# Patient Record
Sex: MALE | Race: ASIAN | HISPANIC OR LATINO | URBAN - METROPOLITAN AREA
[De-identification: names, ages, dates, MRNs, and addresses within clinical notes are randomized per-mention and may not be internally consistent; named-entity substitution may affect disease eponyms.]

---

## 2017-01-18 ENCOUNTER — INPATIENT (INPATIENT)
Facility: HOSPITAL | Age: 82
LOS: 1 days | Discharge: HOME | DRG: 92 | End: 2017-01-20
Attending: HOSPITALIST | Admitting: HOSPITALIST
Payer: COMMERCIAL

## 2017-01-18 VITALS
DIASTOLIC BLOOD PRESSURE: 94 MMHG | SYSTOLIC BLOOD PRESSURE: 188 MMHG | RESPIRATION RATE: 17 BRPM | TEMPERATURE: 99 F | OXYGEN SATURATION: 99 % | HEART RATE: 82 BPM

## 2017-01-18 LAB
ALBUMIN SERPL ELPH-MCNC: 4.7 G/DL — SIGNIFICANT CHANGE UP (ref 3.3–5)
ALP SERPL-CCNC: 45 U/L — SIGNIFICANT CHANGE UP (ref 40–120)
ALT FLD-CCNC: 22 U/L RC — SIGNIFICANT CHANGE UP (ref 10–45)
ANION GAP SERPL CALC-SCNC: 18 MMOL/L — HIGH (ref 5–17)
APTT BLD: 33 SEC — SIGNIFICANT CHANGE UP (ref 27.5–37.4)
AST SERPL-CCNC: 34 U/L — SIGNIFICANT CHANGE UP (ref 10–40)
BASOPHILS # BLD AUTO: 0.1 K/UL — SIGNIFICANT CHANGE UP (ref 0–0.2)
BASOPHILS NFR BLD AUTO: 0 % — SIGNIFICANT CHANGE UP (ref 0–2)
BILIRUB SERPL-MCNC: 0.3 MG/DL — SIGNIFICANT CHANGE UP (ref 0.2–1.2)
BUN SERPL-MCNC: 25 MG/DL — HIGH (ref 7–23)
CALCIUM SERPL-MCNC: 10.1 MG/DL — SIGNIFICANT CHANGE UP (ref 8.4–10.5)
CHLORIDE SERPL-SCNC: 99 MMOL/L — SIGNIFICANT CHANGE UP (ref 96–108)
CO2 SERPL-SCNC: 20 MMOL/L — LOW (ref 22–31)
CREAT SERPL-MCNC: 1.32 MG/DL — HIGH (ref 0.5–1.3)
EOSINOPHIL # BLD AUTO: 0.9 K/UL — HIGH (ref 0–0.5)
EOSINOPHIL NFR BLD AUTO: 5 % — SIGNIFICANT CHANGE UP (ref 0–6)
GLUCOSE SERPL-MCNC: 100 MG/DL — HIGH (ref 70–99)
HCT VFR BLD CALC: 38.8 % — LOW (ref 39–50)
HGB BLD-MCNC: 12.7 G/DL — LOW (ref 13–17)
INR BLD: 1.02 RATIO — SIGNIFICANT CHANGE UP (ref 0.88–1.16)
LYMPHOCYTES # BLD AUTO: 2 K/UL — SIGNIFICANT CHANGE UP (ref 1–3.3)
LYMPHOCYTES # BLD AUTO: 9 % — LOW (ref 13–44)
MCHC RBC-ENTMCNC: 27.7 PG — SIGNIFICANT CHANGE UP (ref 27–34)
MCHC RBC-ENTMCNC: 32.7 GM/DL — SIGNIFICANT CHANGE UP (ref 32–36)
MCV RBC AUTO: 84.7 FL — SIGNIFICANT CHANGE UP (ref 80–100)
MONOCYTES # BLD AUTO: 0.7 K/UL — SIGNIFICANT CHANGE UP (ref 0–0.9)
MONOCYTES NFR BLD AUTO: 4 % — SIGNIFICANT CHANGE UP (ref 2–14)
NEUTROPHILS # BLD AUTO: 13.7 K/UL — HIGH (ref 1.8–7.4)
NEUTROPHILS NFR BLD AUTO: 72 % — SIGNIFICANT CHANGE UP (ref 43–77)
PLATELET # BLD AUTO: 256 K/UL — SIGNIFICANT CHANGE UP (ref 150–400)
POTASSIUM SERPL-MCNC: 6.5 MMOL/L — CRITICAL HIGH (ref 3.5–5.3)
POTASSIUM SERPL-SCNC: 6.5 MMOL/L — CRITICAL HIGH (ref 3.5–5.3)
PROT SERPL-MCNC: 7.8 G/DL — SIGNIFICANT CHANGE UP (ref 6–8.3)
PROTHROM AB SERPL-ACNC: 11.1 SEC — SIGNIFICANT CHANGE UP (ref 10–13.1)
RBC # BLD: 4.59 M/UL — SIGNIFICANT CHANGE UP (ref 4.2–5.8)
RBC # FLD: 15.5 % — HIGH (ref 10.3–14.5)
SODIUM SERPL-SCNC: 137 MMOL/L — SIGNIFICANT CHANGE UP (ref 135–145)
TROPONIN T SERPL-MCNC: <0.01 NG/ML — SIGNIFICANT CHANGE UP (ref 0–0.06)
WBC # BLD: 18.2 K/UL — HIGH (ref 3.8–10.5)
WBC # FLD AUTO: 18.2 K/UL — HIGH (ref 3.8–10.5)

## 2017-01-18 PROCEDURE — 93010 ELECTROCARDIOGRAM REPORT: CPT

## 2017-01-18 PROCEDURE — 99285 EMERGENCY DEPT VISIT HI MDM: CPT | Mod: 25

## 2017-01-18 NOTE — ED PROVIDER NOTE - NS ED MD SCRIBE ATTENDING SCRIBE SECTIONS
HISTORY OF PRESENT ILLNESS/INTAKE ASSESSMENT/SCREENINGS/VITAL SIGNS( Pullset)/PAST MEDICAL/SURGICAL/SOCIAL HISTORY/HIV/RESULTS/REVIEW OF SYSTEMS/PHYSICAL EXAM

## 2017-01-18 NOTE — ED PROVIDER NOTE - MEDICAL DECISION MAKING DETAILS
82 year old male with dizziness also LUE and LLE extremity reported weakness and sensation changes. Risk factors for cerebrovascular accident but outside of TPA window at this time. No focal weakness appreciated on exam but reported subjected weakness. Hx not clearly vertiginous regarding dizziness. Will check labs, CT head, likely neurology consult to rule out subacute stroke.

## 2017-01-18 NOTE — ED PROVIDER NOTE - OBJECTIVE STATEMENT
82 year old male with PMHx of DM, HLD, and HTN, presents with diffuse headache and dizziness since 4pm today. Symptoms began suddenly today and have been intermittent since onset. Denies room spinning. Endorses nausea but denies vomiting. Also endorses unspecified vision changes with left upper and lower extremity numbness/weakness. Pt reports urinating more frequently today than usual. Denies fevers, chills, recent head trauma, chest pain, SOB, abdominal pain, back pain or neck pain. Denies sick contacts. FS at home was 109 PTA.

## 2017-01-18 NOTE — ED ADULT NURSE NOTE - CHIEF COMPLAINT QUOTE
Patient c/o left sided numbness, and dizziness, symptoms started about 4 pm. Patient moving all extremities equally, speech wnl as per daughter.

## 2017-01-18 NOTE — ED ADULT TRIAGE NOTE - CHIEF COMPLAINT QUOTE
Patient c/o left sided numbness, since about 5 pm and dizziness. Patient moving all extremities equally, speech wnl as per daughter.

## 2017-01-18 NOTE — ED PROVIDER NOTE - CARE PLAN
Principal Discharge DX:	Leukocytosis, unspecified type  Secondary Diagnosis:	Lactic acid blood increased

## 2017-01-18 NOTE — ED PROVIDER NOTE - INTERPRETATION
normal sinus rhythm, Normal axis, Normal MD interval and QRS complex. There are no acute ischemic ST or T-wave changes.

## 2017-01-18 NOTE — ED PROVIDER NOTE - PROGRESS NOTE DETAILS
CT head negative, unremarkable neurologic exam negative, however lab workup with leukocytosis with significant left shift, high lactate suggesting possible occult septic process as cause of presenting symptoms with non specific exam - will pan culture, start broad spectrum antibiotics, admit for further care.

## 2017-01-18 NOTE — ED ADULT NURSE NOTE - OBJECTIVE STATEMENT
Pt comes in with dizziness started today a 4pm  No LOC no visual changes noted or weakness noted .   IVL placed and bloods sent as  ordred pending ct scan Kehinde

## 2017-01-18 NOTE — ED PROVIDER NOTE - ENMT NEGATIVE STATEMENT, MLM
Ears: no ear pain and no hearing problems.Nose: no nasal congestion and no nasal drainage.Mouth/Throat: no dysphagia, no hoarseness and no throat pain.Neck: no lumps, no pain, no stiffness and no swollen glands. no nasal conjestion, no throat pain

## 2017-01-19 DIAGNOSIS — R20.2 PARESTHESIA OF SKIN: ICD-10-CM

## 2017-01-19 DIAGNOSIS — I10 ESSENTIAL (PRIMARY) HYPERTENSION: ICD-10-CM

## 2017-01-19 DIAGNOSIS — E11.9 TYPE 2 DIABETES MELLITUS WITHOUT COMPLICATIONS: ICD-10-CM

## 2017-01-19 DIAGNOSIS — D72.829 ELEVATED WHITE BLOOD CELL COUNT, UNSPECIFIED: ICD-10-CM

## 2017-01-19 DIAGNOSIS — Z41.8 ENCOUNTER FOR OTHER PROCEDURES FOR PURPOSES OTHER THAN REMEDYING HEALTH STATE: ICD-10-CM

## 2017-01-19 LAB
ANION GAP SERPL CALC-SCNC: 13 MMOL/L — SIGNIFICANT CHANGE UP (ref 5–17)
ANION GAP SERPL CALC-SCNC: 16 MMOL/L — SIGNIFICANT CHANGE UP (ref 5–17)
APPEARANCE UR: CLEAR — SIGNIFICANT CHANGE UP
BASE EXCESS BLDV CALC-SCNC: -1.8 MMOL/L — SIGNIFICANT CHANGE UP (ref -2–2)
BASOPHILS # BLD AUTO: 0.1 K/UL — SIGNIFICANT CHANGE UP (ref 0–0.2)
BILIRUB UR-MCNC: NEGATIVE — SIGNIFICANT CHANGE UP
BUN SERPL-MCNC: 21 MG/DL — SIGNIFICANT CHANGE UP (ref 7–23)
BUN SERPL-MCNC: 26 MG/DL — HIGH (ref 7–23)
CA-I SERPL-SCNC: 1.26 MMOL/L — SIGNIFICANT CHANGE UP (ref 1.12–1.3)
CALCIUM SERPL-MCNC: 10.1 MG/DL — SIGNIFICANT CHANGE UP (ref 8.4–10.5)
CALCIUM SERPL-MCNC: 9.3 MG/DL — SIGNIFICANT CHANGE UP (ref 8.4–10.5)
CHLORIDE BLDV-SCNC: 107 MMOL/L — SIGNIFICANT CHANGE UP (ref 96–108)
CHLORIDE SERPL-SCNC: 100 MMOL/L — SIGNIFICANT CHANGE UP (ref 96–108)
CHLORIDE SERPL-SCNC: 104 MMOL/L — SIGNIFICANT CHANGE UP (ref 96–108)
CO2 BLDV-SCNC: 24 MMOL/L — SIGNIFICANT CHANGE UP (ref 22–30)
CO2 SERPL-SCNC: 21 MMOL/L — LOW (ref 22–31)
CO2 SERPL-SCNC: 22 MMOL/L — SIGNIFICANT CHANGE UP (ref 22–31)
COLOR SPEC: SIGNIFICANT CHANGE UP
CREAT SERPL-MCNC: 1.19 MG/DL — SIGNIFICANT CHANGE UP (ref 0.5–1.3)
CREAT SERPL-MCNC: 1.27 MG/DL — SIGNIFICANT CHANGE UP (ref 0.5–1.3)
DIFF PNL FLD: NEGATIVE — SIGNIFICANT CHANGE UP
EOSINOPHIL # BLD AUTO: 0.5 K/UL — SIGNIFICANT CHANGE UP (ref 0–0.5)
EOSINOPHIL NFR BLD AUTO: 1 % — SIGNIFICANT CHANGE UP (ref 0–6)
FOLATE SERPL-MCNC: 16.2 NG/ML — SIGNIFICANT CHANGE UP (ref 4.8–24.2)
GAS PNL BLDV: 138 MMOL/L — SIGNIFICANT CHANGE UP (ref 136–145)
GAS PNL BLDV: SIGNIFICANT CHANGE UP
GLUCOSE BLDV-MCNC: 83 MG/DL — SIGNIFICANT CHANGE UP (ref 70–99)
GLUCOSE SERPL-MCNC: 129 MG/DL — HIGH (ref 70–99)
GLUCOSE SERPL-MCNC: 65 MG/DL — LOW (ref 70–99)
GLUCOSE UR QL: NEGATIVE — SIGNIFICANT CHANGE UP
HBA1C BLD-MCNC: 7.4 % — HIGH (ref 4–5.6)
HCO3 BLDV-SCNC: 23 MMOL/L — SIGNIFICANT CHANGE UP (ref 21–29)
HCT VFR BLD CALC: 38.4 % — LOW (ref 39–50)
HCT VFR BLDA CALC: 36 % — LOW (ref 39–50)
HGB BLD CALC-MCNC: 11.8 G/DL — LOW (ref 13–17)
HGB BLD-MCNC: 12.7 G/DL — LOW (ref 13–17)
KETONES UR-MCNC: NEGATIVE — SIGNIFICANT CHANGE UP
LACTATE BLDV-MCNC: 2 MMOL/L — SIGNIFICANT CHANGE UP (ref 0.7–2)
LACTATE SERPL-SCNC: 1.7 MMOL/L — SIGNIFICANT CHANGE UP (ref 0.7–2)
LEUKOCYTE ESTERASE UR-ACNC: NEGATIVE — SIGNIFICANT CHANGE UP
LYMPHOCYTES # BLD AUTO: 0.9 K/UL — LOW (ref 1–3.3)
LYMPHOCYTES # BLD AUTO: 4 % — LOW (ref 13–44)
MAGNESIUM SERPL-MCNC: 1.8 MG/DL — SIGNIFICANT CHANGE UP (ref 1.6–2.6)
MCHC RBC-ENTMCNC: 28.3 PG — SIGNIFICANT CHANGE UP (ref 27–34)
MCHC RBC-ENTMCNC: 33.1 GM/DL — SIGNIFICANT CHANGE UP (ref 32–36)
MCV RBC AUTO: 85.3 FL — SIGNIFICANT CHANGE UP (ref 80–100)
MONOCYTES # BLD AUTO: 0.4 K/UL — SIGNIFICANT CHANGE UP (ref 0–0.9)
MONOCYTES NFR BLD AUTO: 10 % — SIGNIFICANT CHANGE UP (ref 2–14)
NEUTROPHILS # BLD AUTO: 17.3 K/UL — HIGH (ref 1.8–7.4)
NEUTROPHILS NFR BLD AUTO: 77 % — SIGNIFICANT CHANGE UP (ref 43–77)
NITRITE UR-MCNC: NEGATIVE — SIGNIFICANT CHANGE UP
OTHER CELLS CSF MANUAL: 12 ML/DL — LOW (ref 18–22)
PCO2 BLDV: 41 MMHG — SIGNIFICANT CHANGE UP (ref 35–50)
PH BLDV: 7.36 — SIGNIFICANT CHANGE UP (ref 7.35–7.45)
PH UR: 6.5 — SIGNIFICANT CHANGE UP (ref 4.8–8)
PLATELET # BLD AUTO: 229 K/UL — SIGNIFICANT CHANGE UP (ref 150–400)
PO2 BLDV: 47 MMHG — HIGH (ref 25–45)
POTASSIUM BLDV-SCNC: 4.3 MMOL/L — SIGNIFICANT CHANGE UP (ref 3.5–5)
POTASSIUM SERPL-MCNC: 4.7 MMOL/L — SIGNIFICANT CHANGE UP (ref 3.5–5.3)
POTASSIUM SERPL-MCNC: 5 MMOL/L — SIGNIFICANT CHANGE UP (ref 3.5–5.3)
POTASSIUM SERPL-SCNC: 4.7 MMOL/L — SIGNIFICANT CHANGE UP (ref 3.5–5.3)
POTASSIUM SERPL-SCNC: 5 MMOL/L — SIGNIFICANT CHANGE UP (ref 3.5–5.3)
PROT UR-MCNC: SIGNIFICANT CHANGE UP
RAPID RVP RESULT: SIGNIFICANT CHANGE UP
RBC # BLD: 4.5 M/UL — SIGNIFICANT CHANGE UP (ref 4.2–5.8)
RBC # FLD: 16.1 % — HIGH (ref 10.3–14.5)
SAO2 % BLDV: 75 % — SIGNIFICANT CHANGE UP (ref 67–88)
SODIUM SERPL-SCNC: 138 MMOL/L — SIGNIFICANT CHANGE UP (ref 135–145)
SODIUM SERPL-SCNC: 138 MMOL/L — SIGNIFICANT CHANGE UP (ref 135–145)
SP GR SPEC: 1.01 — SIGNIFICANT CHANGE UP (ref 1.01–1.02)
TSH SERPL-MCNC: 2.37 UIU/ML — SIGNIFICANT CHANGE UP (ref 0.27–4.2)
UROBILINOGEN FLD QL: NEGATIVE — SIGNIFICANT CHANGE UP
VIT B12 SERPL-MCNC: >2000 PG/ML — HIGH (ref 243–894)
WBC # BLD: 19.3 K/UL — HIGH (ref 3.8–10.5)
WBC # FLD AUTO: 19.3 K/UL — HIGH (ref 3.8–10.5)

## 2017-01-19 PROCEDURE — 70553 MRI BRAIN STEM W/O & W/DYE: CPT | Mod: 26

## 2017-01-19 PROCEDURE — 71020: CPT | Mod: 26

## 2017-01-19 PROCEDURE — 99223 1ST HOSP IP/OBS HIGH 75: CPT | Mod: GC

## 2017-01-19 RX ORDER — DIPHENHYDRAMINE HCL 50 MG
25 CAPSULE ORAL EVERY 8 HOURS
Qty: 0 | Refills: 0 | Status: DISCONTINUED | OUTPATIENT
Start: 2017-01-19 | End: 2017-01-20

## 2017-01-19 RX ORDER — GLUCAGON INJECTION, SOLUTION 0.5 MG/.1ML
1 INJECTION, SOLUTION SUBCUTANEOUS ONCE
Qty: 0 | Refills: 0 | Status: DISCONTINUED | OUTPATIENT
Start: 2017-01-19 | End: 2017-01-20

## 2017-01-19 RX ORDER — DEXTROSE 50 % IN WATER 50 %
1 SYRINGE (ML) INTRAVENOUS ONCE
Qty: 0 | Refills: 0 | Status: DISCONTINUED | OUTPATIENT
Start: 2017-01-19 | End: 2017-01-20

## 2017-01-19 RX ORDER — HEPARIN SODIUM 5000 [USP'U]/ML
5000 INJECTION INTRAVENOUS; SUBCUTANEOUS EVERY 8 HOURS
Qty: 0 | Refills: 0 | Status: DISCONTINUED | OUTPATIENT
Start: 2017-01-19 | End: 2017-01-20

## 2017-01-19 RX ORDER — PIPERACILLIN AND TAZOBACTAM 4; .5 G/20ML; G/20ML
3.38 INJECTION, POWDER, LYOPHILIZED, FOR SOLUTION INTRAVENOUS ONCE
Qty: 0 | Refills: 0 | Status: COMPLETED | OUTPATIENT
Start: 2017-01-19 | End: 2017-01-19

## 2017-01-19 RX ORDER — VANCOMYCIN HCL 1 G
1000 VIAL (EA) INTRAVENOUS EVERY 12 HOURS
Qty: 0 | Refills: 0 | Status: DISCONTINUED | OUTPATIENT
Start: 2017-01-19 | End: 2017-01-19

## 2017-01-19 RX ORDER — INFLUENZA VIRUS VACCINE 15; 15; 15; 15 UG/.5ML; UG/.5ML; UG/.5ML; UG/.5ML
0.5 SUSPENSION INTRAMUSCULAR ONCE
Qty: 0 | Refills: 0 | Status: DISCONTINUED | OUTPATIENT
Start: 2017-01-19 | End: 2017-01-20

## 2017-01-19 RX ORDER — VANCOMYCIN HCL 1 G
1000 VIAL (EA) INTRAVENOUS ONCE
Qty: 0 | Refills: 0 | Status: COMPLETED | OUTPATIENT
Start: 2017-01-19 | End: 2017-01-19

## 2017-01-19 RX ORDER — SODIUM CHLORIDE 9 MG/ML
2000 INJECTION INTRAMUSCULAR; INTRAVENOUS; SUBCUTANEOUS ONCE
Qty: 0 | Refills: 0 | Status: COMPLETED | OUTPATIENT
Start: 2017-01-19 | End: 2017-01-19

## 2017-01-19 RX ORDER — SODIUM CHLORIDE 9 MG/ML
1000 INJECTION, SOLUTION INTRAVENOUS
Qty: 0 | Refills: 0 | Status: DISCONTINUED | OUTPATIENT
Start: 2017-01-19 | End: 2017-01-20

## 2017-01-19 RX ORDER — METOCLOPRAMIDE HCL 10 MG
10 TABLET ORAL EVERY 8 HOURS
Qty: 0 | Refills: 0 | Status: DISCONTINUED | OUTPATIENT
Start: 2017-01-19 | End: 2017-01-20

## 2017-01-19 RX ORDER — INSULIN LISPRO 100/ML
VIAL (ML) SUBCUTANEOUS AT BEDTIME
Qty: 0 | Refills: 0 | Status: DISCONTINUED | OUTPATIENT
Start: 2017-01-19 | End: 2017-01-20

## 2017-01-19 RX ORDER — DEXTROSE 50 % IN WATER 50 %
25 SYRINGE (ML) INTRAVENOUS ONCE
Qty: 0 | Refills: 0 | Status: DISCONTINUED | OUTPATIENT
Start: 2017-01-19 | End: 2017-01-20

## 2017-01-19 RX ORDER — DEXTROSE 50 % IN WATER 50 %
12.5 SYRINGE (ML) INTRAVENOUS ONCE
Qty: 0 | Refills: 0 | Status: DISCONTINUED | OUTPATIENT
Start: 2017-01-19 | End: 2017-01-20

## 2017-01-19 RX ORDER — PIPERACILLIN AND TAZOBACTAM 4; .5 G/20ML; G/20ML
3.38 INJECTION, POWDER, LYOPHILIZED, FOR SOLUTION INTRAVENOUS EVERY 8 HOURS
Qty: 0 | Refills: 0 | Status: DISCONTINUED | OUTPATIENT
Start: 2017-01-19 | End: 2017-01-19

## 2017-01-19 RX ORDER — INSULIN LISPRO 100/ML
VIAL (ML) SUBCUTANEOUS
Qty: 0 | Refills: 0 | Status: DISCONTINUED | OUTPATIENT
Start: 2017-01-19 | End: 2017-01-20

## 2017-01-19 RX ADMIN — HEPARIN SODIUM 5000 UNIT(S): 5000 INJECTION INTRAVENOUS; SUBCUTANEOUS at 21:20

## 2017-01-19 RX ADMIN — HEPARIN SODIUM 5000 UNIT(S): 5000 INJECTION INTRAVENOUS; SUBCUTANEOUS at 18:06

## 2017-01-19 RX ADMIN — SODIUM CHLORIDE 2000 MILLILITER(S): 9 INJECTION INTRAMUSCULAR; INTRAVENOUS; SUBCUTANEOUS at 02:07

## 2017-01-19 RX ADMIN — Medication 250 MILLIGRAM(S): at 03:30

## 2017-01-19 RX ADMIN — PIPERACILLIN AND TAZOBACTAM 200 GRAM(S): 4; .5 INJECTION, POWDER, LYOPHILIZED, FOR SOLUTION INTRAVENOUS at 02:07

## 2017-01-19 NOTE — H&P ADULT. - PROBLEM SELECTOR PLAN 4
Per patient, on PO medications only, will need to clarify with family  - fingersticks before meals and at bedtime + SSI

## 2017-01-19 NOTE — H&P ADULT. - ASSESSMENT
84M Mandarin-speaking w/ PMH of HTN, T2DM, h/o CVA (5 years ago, no residual deficits), presenting with dizziness, left hand and foot paresthesias, also with leukocytosis. 84M Mandarin-speaking w/ PMH of HTN, T2DM, h/o CVA (5 years ago, left-sided weakness), presenting with dizziness, left hand and foot paresthesias, also with leukocytosis.

## 2017-01-19 NOTE — H&P ADULT. - PROBLEM SELECTOR PLAN 1
Patient with onset of dizziness and left foot and hand paresthesias, without focal neurological deficits.  Etiology TIA vs ?neuropathy 2/2 T2DM vs folate/B12 deficiency vs dehydration   - Patient with onset of dizziness and left foot and hand paresthesias, without focal neurological deficits.  Etiology TIA vs ?neuropathy 2/2 T2DM vs folate/B12 deficiency vs dehydration 2/2 increased urinary frequency   - check B12, folate  - check TSH  - check A1c   - check orthostatic vitals Patient with onset of dizziness and left foot and hand paresthesias, without focal neurological deficits.  CT Head negative.  Etiology TIA vs ?neuropathy 2/2 T2DM vs folate/B12 deficiency vs dehydration 2/2 increased urinary frequency.  orthostatic vitals negative, s/p 2L normal saline  - check B12, folate  - check TSH, A1c Patient with onset of dizziness and left foot and hand paresthesias, without focal neurological deficits.  CT Head negative.  Etiology TIA vs ?neuropathy 2/2 T2DM vs folate/B12 deficiency vs dehydration 2/2 increased urinary frequency.  Orthostatic vitals negative, s/p 2L normal saline  - check B12, folate  - check TSH, A1c Patient with onset of dizziness and left foot and hand paresthesias, without focal neurological deficits.  CT Head negative.  Etiology TIA vs reactivation of old stroke in setting of ?infection vs folate/B12 deficiency. Orthostatic vitals negative, s/p 2L normal saline  - check B12, folate  - check TSH, A1c  - check MRI head w/wo contrast

## 2017-01-19 NOTE — H&P ADULT. - NEGATIVE CARDIOVASCULAR SYMPTOMS
no dyspnea on exertion/no chest pain/no paroxysmal nocturnal dyspnea/no palpitations/no orthopnea/no peripheral edema

## 2017-01-19 NOTE — ED ADULT NURSE REASSESSMENT NOTE - NS ED NURSE REASSESS COMMENT FT1
Pt reassessment  [id number] used.  Pt states he feels better but his is still a little dizzy. MD Gill notified. Pt had a stroke approximately 5-6 yrs prior with no residual side effects.

## 2017-01-19 NOTE — H&P ADULT. - PROBLEM SELECTOR PLAN 2
Leukocytosis to 18, unclear etiology.  Patient with increased urinary frequency, however bland urinalysis  - f/u urine culture  - f/u blood cultures Leukocytosis to 18, unclear etiology.  Patient with increased urinary frequency, however bland urinalysis.  Elevated lactate, ?due to dehydration.  - f/u urine culture  - f/u blood cultures  - repeat lactate Leukocytosis to 18, unclear etiology.  Patient with increased urinary frequency, however bland urinalysis.  Elevated lactate, ?due to dehydration.  Patient states that he has had leukocytosis for >1 year, usually around 20.   - obtain outside records/discuss with family regarding previous workup for leukocytosis.  - c/w vanc + zosyn until cultures return  - f/u urine culture  - f/u blood cultures  - repeat lactate

## 2017-01-19 NOTE — H&P ADULT. - HISTORY OF PRESENT ILLNESS
84M Mandarin-speaking w/ PMH of HTN, T2DM, h/o CVA (5 years ago, no residual deficits), presenting with dizziness, left hand and foot paresthesias x 1 day.  Patient states that about 4pm yesterday he started to feel dizzy.  He was standing at the time, and dizziness did not worsen or improve with movement.  States that it "feels like I was going to have a stroke."  Along with the dizziness he started to feel tingling in his left foot and left hand, like there was "less blood flow."  He did not fall or lose consciousness.  Denies any fevers, chills, cough, chest pain, dyspnea, abdominal pain.  Denies dysuria, but states that he has been urinating much more frequently, up to 20 times per day.         In the ED, T 98.7, HR 82, /94 --> 141/73, RR 18, 95% room air.   He received 2L NS, vancomycin x 1, zosyn x1. : [id number]    84M Mandarin-speaking w/ PMH of HTN, T2DM, h/o CVA (5 years ago, no residual deficits), presenting with dizziness, left hand and foot paresthesias x 1 day.  Patient states that about 4pm yesterday he started to feel dizzy.  He was standing at the time, and dizziness did not worsen or improve with movement.  States that it "feels like I was going to have a stroke."  Along with the dizziness he started to feel tingling in his left foot and left hand, like there was "less blood flow."  He did not fall or lose consciousness.  Denies dysuria, but states that he has been urinating much more frequently, up to 20 times per day.  Overall has been feeling more weak and fatigued.  Denies any vision changes, headache, fevers, chills, cough, chest pain, dyspnea, abdominal pain.          In the ED, T 98.7, HR 82, /94 --> 141/73, RR 18, 95% room air.   He received 2L NS, vancomycin x 1, zosyn x1. : [id number]    84M Mandarin-speaking w/ PMH of HTN, T2DM, h/o CVA (5 years ago, left sided weakness), presenting with dizziness, left hand and foot paresthesias x 1 day.  Patient states that about 4pm yesterday he started to feel dizzy.  He was standing at the time, and dizziness did not worsen or improve with movement.  States that it "feels like I was going to have a stroke."  Along with the dizziness he started to feel tingling in his left foot and left hand, like there was "less blood flow."  He did not fall or lose consciousness.  Denies dysuria, but states that he has been urinating much more frequently, up to 20 times per day.  Overall has been feeling more weak and fatigued.  Denies any vision changes, headache, fevers, chills, cough, chest pain, dyspnea, abdominal pain.          In the ED, T 98.7, HR 82, /94 --> 141/73, RR 18, 95% room air.   He received 2L NS, vancomycin x 1, zosyn x1.

## 2017-01-19 NOTE — H&P ADULT. - NEUROLOGICAL DETAILS
sensation intact/responds to verbal commands/alert and oriented x 3/normal strength/cranial nerves intact

## 2017-01-20 VITALS
SYSTOLIC BLOOD PRESSURE: 118 MMHG | OXYGEN SATURATION: 96 % | TEMPERATURE: 98 F | RESPIRATION RATE: 18 BRPM | HEART RATE: 88 BPM | DIASTOLIC BLOOD PRESSURE: 80 MMHG

## 2017-01-20 LAB
CULTURE RESULTS: SIGNIFICANT CHANGE UP
SPECIMEN SOURCE: SIGNIFICANT CHANGE UP

## 2017-01-20 PROCEDURE — 93880 EXTRACRANIAL BILAT STUDY: CPT

## 2017-01-20 PROCEDURE — 80048 BASIC METABOLIC PNL TOTAL CA: CPT

## 2017-01-20 PROCEDURE — 71046 X-RAY EXAM CHEST 2 VIEWS: CPT

## 2017-01-20 PROCEDURE — 93880 EXTRACRANIAL BILAT STUDY: CPT | Mod: 26

## 2017-01-20 PROCEDURE — 93005 ELECTROCARDIOGRAM TRACING: CPT

## 2017-01-20 PROCEDURE — 87086 URINE CULTURE/COLONY COUNT: CPT

## 2017-01-20 PROCEDURE — 84443 ASSAY THYROID STIM HORMONE: CPT

## 2017-01-20 PROCEDURE — 84132 ASSAY OF SERUM POTASSIUM: CPT

## 2017-01-20 PROCEDURE — 85730 THROMBOPLASTIN TIME PARTIAL: CPT

## 2017-01-20 PROCEDURE — 70553 MRI BRAIN STEM W/O & W/DYE: CPT

## 2017-01-20 PROCEDURE — 99285 EMERGENCY DEPT VISIT HI MDM: CPT | Mod: 25

## 2017-01-20 PROCEDURE — 83605 ASSAY OF LACTIC ACID: CPT

## 2017-01-20 PROCEDURE — 70450 CT HEAD/BRAIN W/O DYE: CPT

## 2017-01-20 PROCEDURE — 82803 BLOOD GASES ANY COMBINATION: CPT

## 2017-01-20 PROCEDURE — A9585: CPT

## 2017-01-20 PROCEDURE — 87581 M.PNEUMON DNA AMP PROBE: CPT

## 2017-01-20 PROCEDURE — 83735 ASSAY OF MAGNESIUM: CPT

## 2017-01-20 PROCEDURE — 82947 ASSAY GLUCOSE BLOOD QUANT: CPT

## 2017-01-20 PROCEDURE — 85610 PROTHROMBIN TIME: CPT

## 2017-01-20 PROCEDURE — 80053 COMPREHEN METABOLIC PANEL: CPT

## 2017-01-20 PROCEDURE — 83036 HEMOGLOBIN GLYCOSYLATED A1C: CPT

## 2017-01-20 PROCEDURE — 87798 DETECT AGENT NOS DNA AMP: CPT

## 2017-01-20 PROCEDURE — 81003 URINALYSIS AUTO W/O SCOPE: CPT

## 2017-01-20 PROCEDURE — 82330 ASSAY OF CALCIUM: CPT

## 2017-01-20 PROCEDURE — 82435 ASSAY OF BLOOD CHLORIDE: CPT

## 2017-01-20 PROCEDURE — 82746 ASSAY OF FOLIC ACID SERUM: CPT

## 2017-01-20 PROCEDURE — 85027 COMPLETE CBC AUTOMATED: CPT

## 2017-01-20 PROCEDURE — 99232 SBSQ HOSP IP/OBS MODERATE 35: CPT | Mod: GC

## 2017-01-20 PROCEDURE — 87486 CHLMYD PNEUM DNA AMP PROBE: CPT

## 2017-01-20 PROCEDURE — 84295 ASSAY OF SERUM SODIUM: CPT

## 2017-01-20 PROCEDURE — 87040 BLOOD CULTURE FOR BACTERIA: CPT

## 2017-01-20 PROCEDURE — 84484 ASSAY OF TROPONIN QUANT: CPT

## 2017-01-20 PROCEDURE — 85014 HEMATOCRIT: CPT

## 2017-01-20 PROCEDURE — 82607 VITAMIN B-12: CPT

## 2017-01-20 PROCEDURE — 87633 RESP VIRUS 12-25 TARGETS: CPT

## 2017-01-20 RX ORDER — ATORVASTATIN CALCIUM 80 MG/1
40 TABLET, FILM COATED ORAL AT BEDTIME
Qty: 0 | Refills: 0 | Status: DISCONTINUED | OUTPATIENT
Start: 2017-01-20 | End: 2017-01-20

## 2017-01-20 RX ORDER — SIMVASTATIN 20 MG/1
0 TABLET, FILM COATED ORAL
Qty: 0 | Refills: 0 | COMMUNITY

## 2017-01-20 RX ORDER — ZOLPIDEM TARTRATE 10 MG/1
0 TABLET ORAL
Qty: 0 | Refills: 0 | COMMUNITY

## 2017-01-20 RX ORDER — ZALEPLON 10 MG
5 CAPSULE ORAL ONCE
Qty: 0 | Refills: 0 | Status: DISCONTINUED | OUTPATIENT
Start: 2017-01-20 | End: 2017-01-20

## 2017-01-20 RX ORDER — ASPIRIN/CALCIUM CARB/MAGNESIUM 324 MG
1 TABLET ORAL
Qty: 30 | Refills: 0
Start: 2017-01-20 | End: 2017-02-19

## 2017-01-20 RX ORDER — METFORMIN HYDROCHLORIDE 850 MG/1
0 TABLET ORAL
Qty: 0 | Refills: 0 | COMMUNITY

## 2017-01-20 RX ORDER — METOPROLOL TARTRATE 50 MG
0 TABLET ORAL
Qty: 0 | Refills: 0 | COMMUNITY

## 2017-01-20 RX ORDER — ASPIRIN/CALCIUM CARB/MAGNESIUM 324 MG
81 TABLET ORAL DAILY
Qty: 0 | Refills: 0 | Status: DISCONTINUED | OUTPATIENT
Start: 2017-01-20 | End: 2017-01-20

## 2017-01-20 RX ORDER — VALSARTAN 80 MG/1
0 TABLET ORAL
Qty: 0 | Refills: 0 | COMMUNITY

## 2017-01-20 RX ORDER — PIOGLITAZONE HYDROCHLORIDE 15 MG/1
0 TABLET ORAL
Qty: 0 | Refills: 0 | COMMUNITY

## 2017-01-20 RX ADMIN — HEPARIN SODIUM 5000 UNIT(S): 5000 INJECTION INTRAVENOUS; SUBCUTANEOUS at 13:20

## 2017-01-20 RX ADMIN — HEPARIN SODIUM 5000 UNIT(S): 5000 INJECTION INTRAVENOUS; SUBCUTANEOUS at 05:21

## 2017-01-20 RX ADMIN — Medication 5 MILLIGRAM(S): at 01:27

## 2017-01-20 NOTE — DISCHARGE NOTE ADULT - PROVIDER TOKENS
FREE:[LAST:[Ma],FIRST:[Cornelius],PHONE:[phone]],FAX:[(   [phone]    -],ADDRESS:[[address] [place] [address] [address], [place] [id number]]]

## 2017-01-20 NOTE — DISCHARGE NOTE ADULT - PLAN OF CARE
Stable. You were evaluated for a possible stroke. The CT and MRI of your head were negative for any acute stroke. You were evaluated by the Neurology team which wanted you to have a carotid doppler study, and ensure that you are on Aspirin and Lipitor at discharge.  You should have an ECHO of your heart and an MRA of the brain, to be set up by your primary care physician. We contacted your primary care physician Dr. Bertrand, and he said that your leukocytosis has been worked up by a hematologist and is unlikely to be cancer. You should follow up with your primary care physician regarding this. We did not identify any source of infection while you were here. Your HgA1c is 7.4. You should continue your Glucotrol, Actos and Metformin as previously prescribed. You should continue your Diovan and Metoprolol as previously prescribed. You should continue lipitor as prescribed.

## 2017-01-20 NOTE — DISCHARGE NOTE ADULT - SECONDARY DIAGNOSIS.
Leukocytosis, unspecified type Diabetes mellitus of other type with complication, unspecified long term insulin use status Essential hypertension High cholesterol

## 2017-01-20 NOTE — DISCHARGE NOTE ADULT - HOSPITAL COURSE
85 yo M pmh of HTN, T2DM, leukocytosis, CVA (5 years ago, residual L hand weakness) p/w dizziness and left hand and foot paresthesia and blurry vision for 1 day with polyuria. No evidence of any neurological abnormalities on exam except for L hand weakness which patient has had chronically. CTH negative. Patient also states he feels dizziness, but orthostatics are stable. MRI with contrast: Chronic right posterior parietal and bilateral cerebellar hemisphere no acute signs. Patient was seen by Neurology and was initially diagnosed with a complex migraine. The patient had a carotid doppler study prior to being discharged which showed: ___________. The patient was instructed to follow up with his PCP regarding an MRA of the head and neck, ECHO and should be on Aspirin 81 daily and Lipitor. 85 yo M pmh of HTN, T2DM, leukocytosis, CVA (5 years ago, residual L hand weakness) p/w dizziness and left hand and foot paresthesia and blurry vision for 1 day with polyuria. No evidence of any neurological abnormalities on exam except for L hand weakness which patient has had chronically. CTH negative. Patient also states he feels dizziness, but orthostatics are stable. MRI with contrast: Chronic right posterior parietal and bilateral cerebellar hemisphere no acute signs. Patient was seen by Neurology and was initially diagnosed with a complex migraine. The patient had a carotid doppler study prior to being discharged which showed normal velocities and no evidence of stenosis, but was a limited study. The patient was instructed to follow up with his PCP regarding an MRA of the head and neck, ECHO and should be on Aspirin 81 daily and Lipitor.

## 2017-01-20 NOTE — PROVIDER CONTACT NOTE (OTHER) - ACTION/TREATMENT ORDERED:
As per pt,. he has never taken either benadryl or melatonin for sleep and is not familiar with the medications. As per MD, she will prescribe benadryl
A per MD, please ask pt whether he prefers melatonin or benadryl
MD does not want to order ambien as may cause complications.  MD will order a different medication to help pt sleep.

## 2017-01-20 NOTE — DISCHARGE NOTE ADULT - NS MD DC FALL RISK RISK
For information on Fall & Injury Prevention, visit [email or web address]

## 2017-01-20 NOTE — DISCHARGE NOTE ADULT - CARE PROVIDER_API CALL
Cornelius Bertrand  [address] [place] [address] [id number], [place] [address]  Phone: [phone]  Fax: (   )    -

## 2017-01-20 NOTE — PROVIDER CONTACT NOTE (OTHER) - ASSESSMENT
A&O&3. pt c/o he has not slept last night either and wants medication for sleep.
pt A&O&3. requesting sleeping medication.

## 2017-01-20 NOTE — DISCHARGE NOTE ADULT - MEDICATION SUMMARY - MEDICATIONS TO TAKE
I will START or STAY ON the medications listed below when I get home from the hospital:    aspirin 81 mg oral tablet, chewable  -- 1 tab(s) by mouth once a day  -- Indication: For Heart disease    Diovan  --  by mouth   -- Indication: For Hypertension    metFORMIN  --  by mouth   -- Indication: For Diabetes mellitus of other type with complication, unspecified long term insulin use status    Actos  --  by mouth   -- Indication: For Diabetes mellitus of other type with complication, unspecified long term insulin use status    Glucotrol  --  by mouth   -- Indication: For Diabetes mellitus of other type with complication, unspecified long term insulin use status    simvastatin  --  by mouth   -- Indication: For High choelsterol    zolpidem  --  by mouth   -- Indication: For Sleep    metoprolol  --  by mouth   -- Indication: For Hypertension I will START or STAY ON the medications listed below when I get home from the hospital:    aspirin 81 mg oral tablet, chewable  -- 1 tab(s) by mouth once a day  -- Indication: For Heart disease    Januvia 100 mg oral tablet  -- 1 tab(s) by mouth once a day  -- Indication: For Diabetes mellitus of other type with complication, unspecified long term insulin use status    metFORMIN 1000 mg oral tablet  -- 1 tab(s) by mouth 2 times a day  -- Indication: For Diabetes mellitus of other type with complication, unspecified long term insulin use status    simvastatin 20 mg oral tablet  -- 1 tab(s) by mouth once a day (at bedtime)  -- Indication: For Cholesterol    zolpidem 10 mg oral tablet  -- 1 tab(s) by mouth once a day (at bedtime)  -- Indication: For Insominia    metoprolol tartrate 25 mg oral tablet  -- 1 tab(s) by mouth 2 times a day  -- Indication: For HTN (hypertension)

## 2017-01-20 NOTE — PROVIDER CONTACT NOTE (OTHER) - RECOMMENDATIONS
action continued: RN informed pt's wife about MD's decision whom translated information to pt, pt agreed.,

## 2017-01-20 NOTE — PROVIDER CONTACT NOTE (MEDICATION) - SITUATION
questioned sonata medication order as originally MD said she will order benadryl. MD was at a rapid.

## 2017-01-20 NOTE — DISCHARGE NOTE ADULT - PATIENT PORTAL LINK FT
“You can access the Follow[employer] Patient Portal, offered by [facility], by registering with the following website: [email or web address]”

## 2017-01-20 NOTE — DISCHARGE NOTE ADULT - MEDICATION SUMMARY - MEDICATIONS TO STOP TAKING
I will STOP taking the medications listed below when I get home from the hospital:  None I will STOP taking the medications listed below when I get home from the hospital:    Actos  --  by mouth    Glucotrol  --  by mouth    Diovan  --  by mouth

## 2017-01-20 NOTE — DISCHARGE NOTE ADULT - CARE PLAN
Principal Discharge DX:	Paresthesia  Goal:	Stable.  Instructions for follow-up, activity and diet:	You were evaluated for a possible stroke. The CT and MRI of your head were negative for any acute stroke. You were evaluated by the Neurology team which wanted you to have a carotid doppler study, and ensure that you are on Aspirin and Lipitor at discharge.  You should have an ECHO of your heart and an MRA of the brain, to be set up by your primary care physician.  Secondary Diagnosis:	Leukocytosis, unspecified type  Instructions for follow-up, activity and diet:	We contacted your primary care physician Dr. Bertrand, and he said that your leukocytosis has been worked up by a hematologist and is unlikely to be cancer. You should follow up with your primary care physician regarding this. We did not identify any source of infection while you were here.  Secondary Diagnosis:	Diabetes mellitus of other type with complication, unspecified long term insulin use status  Instructions for follow-up, activity and diet:	Your HgA1c is 7.4. You should continue your Glucotrol, Actos and Metformin as previously prescribed.  Secondary Diagnosis:	Essential hypertension  Goal:	Stable.  Instructions for follow-up, activity and diet:	You should continue your Diovan and Metoprolol as previously prescribed.  Secondary Diagnosis:	High cholesterol  Instructions for follow-up, activity and diet:	You should continue lipitor as prescribed.

## 2017-01-24 LAB
CULTURE RESULTS: SIGNIFICANT CHANGE UP
CULTURE RESULTS: SIGNIFICANT CHANGE UP
SPECIMEN SOURCE: SIGNIFICANT CHANGE UP
SPECIMEN SOURCE: SIGNIFICANT CHANGE UP

## 2019-09-22 ENCOUNTER — INPATIENT (INPATIENT)
Facility: HOSPITAL | Age: 84
LOS: 9 days | Discharge: HOME | DRG: 823 | End: 2019-10-02
Admitting: HOSPITALIST
Payer: COMMERCIAL

## 2019-09-22 VITALS
WEIGHT: 127.87 LBS | DIASTOLIC BLOOD PRESSURE: 50 MMHG | TEMPERATURE: 99 F | RESPIRATION RATE: 22 BRPM | HEART RATE: 129 BPM | SYSTOLIC BLOOD PRESSURE: 102 MMHG | HEIGHT: 66 IN | OXYGEN SATURATION: 96 %

## 2019-09-22 LAB
ALBUMIN SERPL ELPH-MCNC: 3.5 G/DL — SIGNIFICANT CHANGE UP (ref 3.3–5)
ALP SERPL-CCNC: 132 U/L — HIGH (ref 40–120)
ALT FLD-CCNC: 17 U/L — SIGNIFICANT CHANGE UP (ref 10–45)
ANION GAP SERPL CALC-SCNC: 17 MMOL/L — SIGNIFICANT CHANGE UP (ref 5–17)
APTT BLD: 29 SEC — SIGNIFICANT CHANGE UP (ref 27.5–36.3)
AST SERPL-CCNC: 34 U/L — SIGNIFICANT CHANGE UP (ref 10–40)
BILIRUB SERPL-MCNC: 0.6 MG/DL — SIGNIFICANT CHANGE UP (ref 0.2–1.2)
BUN SERPL-MCNC: 25 MG/DL — HIGH (ref 7–23)
CALCIUM SERPL-MCNC: 8.9 MG/DL — SIGNIFICANT CHANGE UP (ref 8.4–10.5)
CHLORIDE SERPL-SCNC: 98 MMOL/L — SIGNIFICANT CHANGE UP (ref 96–108)
CO2 SERPL-SCNC: 20 MMOL/L — LOW (ref 22–31)
CREAT SERPL-MCNC: 1.54 MG/DL — HIGH (ref 0.5–1.3)
FLU A RESULT: SIGNIFICANT CHANGE UP
FLU A RESULT: SIGNIFICANT CHANGE UP
FLUAV AG NPH QL: SIGNIFICANT CHANGE UP
FLUBV AG NPH QL: SIGNIFICANT CHANGE UP
GLUCOSE SERPL-MCNC: 284 MG/DL — HIGH (ref 70–99)
HCT VFR BLD CALC: 25.6 % — LOW (ref 39–50)
HGB BLD-MCNC: 7.9 G/DL — LOW (ref 13–17)
INR BLD: 1.13 RATIO — SIGNIFICANT CHANGE UP (ref 0.88–1.16)
LACTATE BLDV-MCNC: 5.2 MMOL/L — CRITICAL HIGH (ref 0.7–2)
MCHC RBC-ENTMCNC: 28.9 PG — SIGNIFICANT CHANGE UP (ref 27–34)
MCHC RBC-ENTMCNC: 30.8 GM/DL — LOW (ref 32–36)
MCV RBC AUTO: 93.8 FL — SIGNIFICANT CHANGE UP (ref 80–100)
POTASSIUM SERPL-MCNC: 4 MMOL/L — SIGNIFICANT CHANGE UP (ref 3.5–5.3)
POTASSIUM SERPL-SCNC: 4 MMOL/L — SIGNIFICANT CHANGE UP (ref 3.5–5.3)
PROT SERPL-MCNC: 6.1 G/DL — SIGNIFICANT CHANGE UP (ref 6–8.3)
PROTHROM AB SERPL-ACNC: 13.1 SEC — HIGH (ref 10–12.9)
RBC # BLD: 2.73 M/UL — LOW (ref 4.2–5.8)
RBC # FLD: 18.9 % — HIGH (ref 10.3–14.5)
RSV RESULT: SIGNIFICANT CHANGE UP
RSV RNA RESP QL NAA+PROBE: SIGNIFICANT CHANGE UP
SODIUM SERPL-SCNC: 135 MMOL/L — SIGNIFICANT CHANGE UP (ref 135–145)
WBC # BLD: 129 K/UL — CRITICAL HIGH (ref 3.8–10.5)
WBC # FLD AUTO: 129 K/UL — CRITICAL HIGH (ref 3.8–10.5)

## 2019-09-22 PROCEDURE — 99285 EMERGENCY DEPT VISIT HI MDM: CPT | Mod: 25

## 2019-09-22 PROCEDURE — 93010 ELECTROCARDIOGRAM REPORT: CPT

## 2019-09-22 PROCEDURE — 71045 X-RAY EXAM CHEST 1 VIEW: CPT | Mod: 26

## 2019-09-22 RX ORDER — ACETAMINOPHEN 500 MG
650 TABLET ORAL ONCE
Refills: 0 | Status: COMPLETED | OUTPATIENT
Start: 2019-09-22 | End: 2019-09-22

## 2019-09-22 RX ORDER — VANCOMYCIN HCL 1 G
1000 VIAL (EA) INTRAVENOUS ONCE
Refills: 0 | Status: COMPLETED | OUTPATIENT
Start: 2019-09-22 | End: 2019-09-22

## 2019-09-22 RX ORDER — PIPERACILLIN AND TAZOBACTAM 4; .5 G/20ML; G/20ML
3.38 INJECTION, POWDER, LYOPHILIZED, FOR SOLUTION INTRAVENOUS ONCE
Refills: 0 | Status: COMPLETED | OUTPATIENT
Start: 2019-09-22 | End: 2019-09-22

## 2019-09-22 RX ADMIN — Medication 650 MILLIGRAM(S): at 23:11

## 2019-09-22 RX ADMIN — Medication 1000 MILLIGRAM(S): at 23:58

## 2019-09-22 RX ADMIN — Medication 250 MILLIGRAM(S): at 23:57

## 2019-09-22 RX ADMIN — Medication 650 MILLIGRAM(S): at 23:48

## 2019-09-22 RX ADMIN — PIPERACILLIN AND TAZOBACTAM 200 GRAM(S): 4; .5 INJECTION, POWDER, LYOPHILIZED, FOR SOLUTION INTRAVENOUS at 23:11

## 2019-09-22 RX ADMIN — PIPERACILLIN AND TAZOBACTAM 3.38 GRAM(S): 4; .5 INJECTION, POWDER, LYOPHILIZED, FOR SOLUTION INTRAVENOUS at 23:58

## 2019-09-22 NOTE — ED PROVIDER NOTE - OBJECTIVE STATEMENT
84M, hx of DM2, HTN, CVA, newly dx lung ca w/ liver mets presents w family w chief complaint of weakness, lethargy, shortness of breath, feels warm and sweaty. Patient is Mandarin speaking. Patient declined  services and requested family translate at bedside. Patient recently returned from [facility], and today was noted to be much weaker from baseline, and endorsing feeling very short of breath. Per wife, patient has been coughing recently but unable to quantify for how long. Patient denies chest pain, nausea or vomiting, chills, abdominal pain, dysuria, diarrhea. Endorsing headache, fatigue. Also reporting increased swelling to BL lower extremities.  No reported allergies. Current smoker.  Meds include metformin, glipizide, zolpidem, januvia, bisoprolol, amlodipine, statin.

## 2019-09-22 NOTE — ED PROVIDER NOTE - CLINICAL SUMMARY MEDICAL DECISION MAKING FREE TEXT BOX
84M, hx of DM2, HTN, CVA, newly dx lung ca w/ liver mets presents w family w chief complaint of weakness, lethargy, shortness of breath, warm and sweaty. Febrile. Tachycardic. Exam as above. Concern for Infectious etiology vs PE vs other. Concern for fluid overload given crackles and pitting edema. Will check labs, trop/bnp, ekg, cxr, CTA chest. Antibx. Full fluid bolus to be held pending labs to r/o fluid overload/chf exacerbation. Will continue to follow up and re-assess. Case discussed with Attending.  Adis Lopez MD, PGY3 Emergency Medicine

## 2019-09-22 NOTE — ED PROVIDER NOTE - CARE PLAN
Principal Discharge DX:	PNA (pneumonia)  Secondary Diagnosis:	Sepsis  Secondary Diagnosis:	Lung cancer

## 2019-09-22 NOTE — ED ADULT NURSE NOTE - NSIMPLEMENTINTERV_GEN_ALL_ED
Implemented All Universal Safety Interventions:  [place] to call system. Call bell, personal items and telephone within reach. Instruct patient to call for assistance. Room bathroom lighting operational. Non-slip footwear when patient is off stretcher. Physically safe environment: no spills, clutter or unnecessary equipment. Stretcher in lowest position, wheels locked, appropriate side rails in place.

## 2019-09-22 NOTE — ED PROVIDER NOTE - PHYSICAL EXAMINATION
General: ill appearing, alert, oriented, Resting in bed. Febrile.   HEENT: PERRLA EOMI. No trauma/bruising noted to head or face.   CV: TACHY rate and regular rhythm, S1/S2, no murmurs/rubs/gallops noted on exam. No tenderness to palpation to chest wall.  Lungs: Mild crackles noted to BL lower lung bases. Air entry to all lung fields. No wheezing/rales.   Abdomen: Soft, non tender, non distended, no guarding or rebound. Mild hepatosplenomegaly. No CVA tenderness to palpation.   MSK: No tenderness to palpation to extremities.  No gross deformities noted to extremities.  Neuro: Awake, A+O x4, moving all extremities spontaneously. CN 2-12 grossly intact. Strength and sensation grossly intact to all extremities, but diffusely weak.   Extremities: 2+ pitting edema BL lower extremities below knees. No calf tenderness to palpation.

## 2019-09-22 NOTE — ED PROVIDER NOTE - ATTENDING CONTRIBUTION TO CARE
84 M primarily mandarin speaking M p/w shortness of breath and lower extremity leg swelling. Pt is accompanied w/ his wife and son who provide much of the history as they prefer to transalate for him. They report that last week he pulled his back - had an eval in [facility] which found metastatic lung cancer than has spread to the liver. No chest pain. Reports bilateral edema x2 days w/ dyspnea. No prior hx of CHF. On exam, pt is tachypneic, has bibasilar crackles, no abdominal tenderness, plan for labs, CXR and ekg, pt is febrile in the ED.         1138 PM went to update the family regarding findings of the CBC, they state that the patient has had an elevated WBC count, no hx of recent steroid usage. 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

## 2019-09-22 NOTE — ED PROVIDER NOTE - SEVERE SEPSIS ALERT DETAILS
Attending MD Cespedes:  The patient is in acute pulmonary edema therefore 30cc/kg of IV fluid is contraindicated at this time.  Patient given appropriate volume of fluid for their clinical status and I will continue to monitor status.

## 2019-09-22 NOTE — ED PROVIDER NOTE - PROGRESS NOTE DETAILS
CTA chest w/o PE, however notable for lung masses, as well as right lower PNA (also with liver masses/mets). See full report. Elevated BNP, concern for mild CHF. Will rpt trop, VBG. Gentle hydration. Will admit for further care - will ask rep to call PMD  Adis Lopez MD, PGY3 Emergency Medicine

## 2019-09-22 NOTE — ED ADULT NURSE NOTE - OBJECTIVE STATEMENT
84y Male A&Ox3 came to ED c/o SOB.  PMH of lung cancer, DM, HTN, HLD.  Pt speaks Mandarin primarily, refuses , requests wife and son to translate. Pt recently traveled from [facility], has been feeling much more lethargic with SOB and chills.  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  Pt presents with SOB, lethargic, bilateral crackles on lower lungs, adequate chest rise, 5/10 headache that is "all over". Denies chest pain, n/v/d, abdominal pain, urinary symptoms, hematuria.  Upon examination, full facial symmetry, no JVD or trach deviation, S1 and S2 heart sounds present, +2 pulses in all extremities with full ROM and equal strength, cap refill <2 seconds, ABD soft, non distended and non tender, ABD sounds present, pelvis intact. 84y Male A&Ox3 came to ED c/o SOB.  PMH of lung cancer, DM, HTN, HLD.  Pt speaks Mandarin primarily, refuses , requests wife and son to translate. Pt recently traveled from [facility] has been feeling much more lethargic with SOB and chills.  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  Pt presents with SOB, lethargic, bilateral crackles on lower lungs, adequate chest rise, 5/10 headache that is "all over". Denies chest pain, n/v/d, abdominal pain, urinary symptoms, hematuria.  Upon examination, full facial symmetry, no JVD or trach deviation, S1 and S2 heart sounds present, +2 pulses in all extremities with full ROM and equal strength, cap refill <2 seconds, ABD soft, non distended and non tender, ABD sounds present, pelvis intact.

## 2019-09-23 ENCOUNTER — RESULT REVIEW (OUTPATIENT)
Age: 84
End: 2019-09-23

## 2019-09-23 DIAGNOSIS — C95.00 ACUTE LEUKEMIA OF UNSPECIFIED CELL TYPE NOT HAVING ACHIEVED REMISSION: ICD-10-CM

## 2019-09-23 DIAGNOSIS — A41.9 SEPSIS, UNSPECIFIED ORGANISM: ICD-10-CM

## 2019-09-23 DIAGNOSIS — R00.0 TACHYCARDIA, UNSPECIFIED: ICD-10-CM

## 2019-09-23 DIAGNOSIS — R06.02 SHORTNESS OF BREATH: ICD-10-CM

## 2019-09-23 DIAGNOSIS — I10 ESSENTIAL (PRIMARY) HYPERTENSION: ICD-10-CM

## 2019-09-23 DIAGNOSIS — I50.9 HEART FAILURE, UNSPECIFIED: ICD-10-CM

## 2019-09-23 DIAGNOSIS — D72.829 ELEVATED WHITE BLOOD CELL COUNT, UNSPECIFIED: ICD-10-CM

## 2019-09-23 DIAGNOSIS — N17.9 ACUTE KIDNEY FAILURE, UNSPECIFIED: ICD-10-CM

## 2019-09-23 DIAGNOSIS — C34.90 MALIGNANT NEOPLASM OF UNSPECIFIED PART OF UNSPECIFIED BRONCHUS OR LUNG: ICD-10-CM

## 2019-09-23 DIAGNOSIS — J18.9 PNEUMONIA, UNSPECIFIED ORGANISM: ICD-10-CM

## 2019-09-23 DIAGNOSIS — D73.5 INFARCTION OF SPLEEN: ICD-10-CM

## 2019-09-23 DIAGNOSIS — Z29.9 ENCOUNTER FOR PROPHYLACTIC MEASURES, UNSPECIFIED: ICD-10-CM

## 2019-09-23 DIAGNOSIS — E11.9 TYPE 2 DIABETES MELLITUS WITHOUT COMPLICATIONS: ICD-10-CM

## 2019-09-23 LAB
ALBUMIN SERPL ELPH-MCNC: 3.4 G/DL — SIGNIFICANT CHANGE UP (ref 3.3–5)
ALP SERPL-CCNC: 111 U/L — SIGNIFICANT CHANGE UP (ref 40–120)
ALT FLD-CCNC: 13 U/L — SIGNIFICANT CHANGE UP (ref 10–45)
ANION GAP SERPL CALC-SCNC: 13 MMOL/L — SIGNIFICANT CHANGE UP (ref 5–17)
ANION GAP SERPL CALC-SCNC: 13 MMOL/L — SIGNIFICANT CHANGE UP (ref 5–17)
ANION GAP SERPL CALC-SCNC: 16 MMOL/L — SIGNIFICANT CHANGE UP (ref 5–17)
APPEARANCE UR: CLEAR — SIGNIFICANT CHANGE UP
APTT BLD: 35.8 SEC — SIGNIFICANT CHANGE UP (ref 27.5–36.3)
APTT BLD: 36.9 SEC — HIGH (ref 27.5–36.3)
APTT BLD: 38.3 SEC — HIGH (ref 27.5–36.3)
AST SERPL-CCNC: 27 U/L — SIGNIFICANT CHANGE UP (ref 10–40)
B PERT DNA SPEC QL NAA+PROBE: SIGNIFICANT CHANGE UP
BACTERIA # UR AUTO: NEGATIVE — SIGNIFICANT CHANGE UP
BASE EXCESS BLDV CALC-SCNC: 0.4 MMOL/L — SIGNIFICANT CHANGE UP (ref -2–2)
BASOPHILS # BLD AUTO: 2.41 K/UL — HIGH (ref 0–0.2)
BASOPHILS NFR BLD AUTO: 1 % — SIGNIFICANT CHANGE UP (ref 0–2)
BASOPHILS NFR BLD AUTO: 2 % — SIGNIFICANT CHANGE UP (ref 0–2)
BILIRUB SERPL-MCNC: 0.7 MG/DL — SIGNIFICANT CHANGE UP (ref 0.2–1.2)
BILIRUB UR-MCNC: NEGATIVE — SIGNIFICANT CHANGE UP
BLASTS # FLD: 20 % — HIGH (ref 0–0)
BLD GP AB SCN SERPL QL: NEGATIVE — SIGNIFICANT CHANGE UP
BUN SERPL-MCNC: 21 MG/DL — SIGNIFICANT CHANGE UP (ref 7–23)
BUN SERPL-MCNC: 21 MG/DL — SIGNIFICANT CHANGE UP (ref 7–23)
BUN SERPL-MCNC: 22 MG/DL — SIGNIFICANT CHANGE UP (ref 7–23)
C PNEUM DNA SPEC QL NAA+PROBE: SIGNIFICANT CHANGE UP
CA-I SERPL-SCNC: 1.15 MMOL/L — SIGNIFICANT CHANGE UP (ref 1.12–1.3)
CALCIUM SERPL-MCNC: 8.9 MG/DL — SIGNIFICANT CHANGE UP (ref 8.4–10.5)
CHLORIDE BLDV-SCNC: 100 MMOL/L — SIGNIFICANT CHANGE UP (ref 96–108)
CHLORIDE SERPL-SCNC: 100 MMOL/L — SIGNIFICANT CHANGE UP (ref 96–108)
CHLORIDE SERPL-SCNC: 101 MMOL/L — SIGNIFICANT CHANGE UP (ref 96–108)
CHLORIDE SERPL-SCNC: 102 MMOL/L — SIGNIFICANT CHANGE UP (ref 96–108)
CO2 BLDV-SCNC: 25 MMOL/L — SIGNIFICANT CHANGE UP (ref 22–30)
CO2 SERPL-SCNC: 22 MMOL/L — SIGNIFICANT CHANGE UP (ref 22–31)
CO2 SERPL-SCNC: 24 MMOL/L — SIGNIFICANT CHANGE UP (ref 22–31)
CO2 SERPL-SCNC: 24 MMOL/L — SIGNIFICANT CHANGE UP (ref 22–31)
COLOR SPEC: COLORLESS — SIGNIFICANT CHANGE UP
CREAT SERPL-MCNC: 1.54 MG/DL — HIGH (ref 0.5–1.3)
CREAT SERPL-MCNC: 1.56 MG/DL — HIGH (ref 0.5–1.3)
CREAT SERPL-MCNC: 1.56 MG/DL — HIGH (ref 0.5–1.3)
D DIMER BLD IA.RAPID-MCNC: 3352 NG/ML DDU — HIGH
D DIMER BLD IA.RAPID-MCNC: 9015 NG/ML DDU — HIGH
D DIMER BLD IA.RAPID-MCNC: 9151 NG/ML DDU — HIGH
DIFF PNL FLD: ABNORMAL
EOSINOPHIL # BLD AUTO: 1.21 K/UL — HIGH (ref 0–0.5)
EOSINOPHIL NFR BLD AUTO: 1 % — SIGNIFICANT CHANGE UP (ref 0–6)
EPI CELLS # UR: 0 /HPF — SIGNIFICANT CHANGE UP
FIBRINOGEN PPP-MCNC: 362 MG/DL — SIGNIFICANT CHANGE UP (ref 350–510)
FIBRINOGEN PPP-MCNC: 369 MG/DL — SIGNIFICANT CHANGE UP (ref 350–510)
FIBRINOGEN PPP-MCNC: 380 MG/DL — SIGNIFICANT CHANGE UP (ref 350–510)
FLUAV H1 2009 PAND RNA SPEC QL NAA+PROBE: SIGNIFICANT CHANGE UP
FLUAV H1 RNA SPEC QL NAA+PROBE: SIGNIFICANT CHANGE UP
FLUAV H3 RNA SPEC QL NAA+PROBE: SIGNIFICANT CHANGE UP
FLUAV SUBTYP SPEC NAA+PROBE: SIGNIFICANT CHANGE UP
FLUBV RNA SPEC QL NAA+PROBE: SIGNIFICANT CHANGE UP
GAS PNL BLDV: 131 MMOL/L — LOW (ref 135–145)
GAS PNL BLDV: SIGNIFICANT CHANGE UP
GAS PNL BLDV: SIGNIFICANT CHANGE UP
GLUCOSE BLDC GLUCOMTR-MCNC: 131 MG/DL — HIGH (ref 70–99)
GLUCOSE BLDC GLUCOMTR-MCNC: 82 MG/DL — SIGNIFICANT CHANGE UP (ref 70–99)
GLUCOSE BLDC GLUCOMTR-MCNC: 89 MG/DL — SIGNIFICANT CHANGE UP (ref 70–99)
GLUCOSE BLDC GLUCOMTR-MCNC: 91 MG/DL — SIGNIFICANT CHANGE UP (ref 70–99)
GLUCOSE BLDV-MCNC: 169 MG/DL — HIGH (ref 70–99)
GLUCOSE SERPL-MCNC: 76 MG/DL — SIGNIFICANT CHANGE UP (ref 70–99)
GLUCOSE SERPL-MCNC: 85 MG/DL — SIGNIFICANT CHANGE UP (ref 70–99)
GLUCOSE SERPL-MCNC: 92 MG/DL — SIGNIFICANT CHANGE UP (ref 70–99)
GLUCOSE UR QL: NEGATIVE — SIGNIFICANT CHANGE UP
HADV DNA SPEC QL NAA+PROBE: SIGNIFICANT CHANGE UP
HBA1C BLD-MCNC: 5.7 % — HIGH (ref 4–5.6)
HCO3 BLDV-SCNC: 24 MMOL/L — SIGNIFICANT CHANGE UP (ref 21–29)
HCOV PNL SPEC NAA+PROBE: SIGNIFICANT CHANGE UP
HCT VFR BLD CALC: 23.7 % — LOW (ref 39–50)
HCT VFR BLD CALC: 24.6 % — LOW (ref 39–50)
HCT VFR BLD CALC: 25.5 % — LOW (ref 39–50)
HCT VFR BLDA CALC: 20 % — CRITICAL LOW (ref 39–50)
HGB BLD CALC-MCNC: 6.5 G/DL — CRITICAL LOW (ref 13–17)
HGB BLD-MCNC: 7.2 G/DL — LOW (ref 13–17)
HGB BLD-MCNC: 7.3 G/DL — LOW (ref 13–17)
HGB BLD-MCNC: 7.6 G/DL — LOW (ref 13–17)
HMPV RNA SPEC QL NAA+PROBE: SIGNIFICANT CHANGE UP
HPIV1 RNA SPEC QL NAA+PROBE: SIGNIFICANT CHANGE UP
HPIV2 RNA SPEC QL NAA+PROBE: SIGNIFICANT CHANGE UP
HPIV3 RNA SPEC QL NAA+PROBE: SIGNIFICANT CHANGE UP
HPIV4 RNA SPEC QL NAA+PROBE: SIGNIFICANT CHANGE UP
HYALINE CASTS # UR AUTO: 0 /LPF — SIGNIFICANT CHANGE UP (ref 0–2)
INR BLD: 1.15 RATIO — SIGNIFICANT CHANGE UP (ref 0.88–1.16)
INR BLD: 1.2 RATIO — HIGH (ref 0.88–1.16)
KETONES UR-MCNC: NEGATIVE — SIGNIFICANT CHANGE UP
LACTATE BLDV-MCNC: 2.9 MMOL/L — HIGH (ref 0.7–2)
LACTATE SERPL-SCNC: 1.9 MMOL/L — SIGNIFICANT CHANGE UP (ref 0.7–2)
LDH SERPL L TO P-CCNC: 1833 U/L — HIGH (ref 50–242)
LDH SERPL L TO P-CCNC: 2112 U/L — HIGH (ref 50–242)
LEGIONELLA AG UR QL: NEGATIVE — SIGNIFICANT CHANGE UP
LEUKOCYTE ESTERASE UR-ACNC: NEGATIVE — SIGNIFICANT CHANGE UP
LG PLATELETS BLD QL AUTO: SLIGHT — SIGNIFICANT CHANGE UP
LYMPHOCYTES # BLD AUTO: 5 % — LOW (ref 13–44)
LYMPHOCYTES # BLD AUTO: 6 % — LOW (ref 13–44)
LYMPHOCYTES # BLD AUTO: 6.03 K/UL — HIGH (ref 1–3.3)
MACROCYTES BLD QL: SIGNIFICANT CHANGE UP
MAGNESIUM SERPL-MCNC: 1.8 MG/DL — SIGNIFICANT CHANGE UP (ref 1.6–2.6)
MAGNESIUM SERPL-MCNC: 1.9 MG/DL — SIGNIFICANT CHANGE UP (ref 1.6–2.6)
MANUAL DIF COMMENT BLD-IMP: SIGNIFICANT CHANGE UP
MCHC RBC-ENTMCNC: 28.1 PG — SIGNIFICANT CHANGE UP (ref 27–34)
MCHC RBC-ENTMCNC: 28.5 PG — SIGNIFICANT CHANGE UP (ref 27–34)
MCHC RBC-ENTMCNC: 29 PG — SIGNIFICANT CHANGE UP (ref 27–34)
MCHC RBC-ENTMCNC: 29.7 GM/DL — LOW (ref 32–36)
MCHC RBC-ENTMCNC: 29.8 GM/DL — LOW (ref 32–36)
MCHC RBC-ENTMCNC: 30.4 GM/DL — LOW (ref 32–36)
MCV RBC AUTO: 94.6 FL — SIGNIFICANT CHANGE UP (ref 80–100)
MCV RBC AUTO: 95.5 FL — SIGNIFICANT CHANGE UP (ref 80–100)
MCV RBC AUTO: 95.6 FL — SIGNIFICANT CHANGE UP (ref 80–100)
METAMYELOCYTES # FLD: 11 % — HIGH (ref 0–0)
MONOCYTES # BLD AUTO: 18.08 K/UL — HIGH (ref 0–0.9)
MONOCYTES NFR BLD AUTO: 15 % — HIGH (ref 2–14)
MONOCYTES NFR BLD AUTO: 5 % — SIGNIFICANT CHANGE UP (ref 2–14)
MYELOCYTES NFR BLD: 7 % — HIGH (ref 0–0)
NEUTROPHILS # BLD AUTO: 60.28 K/UL — HIGH (ref 1.8–7.4)
NEUTROPHILS # BLD AUTO: SIGNIFICANT CHANGE UP (ref 1.8–7.4)
NEUTROPHILS NFR BLD AUTO: 41 % — LOW (ref 43–77)
NEUTROPHILS NFR BLD AUTO: 42 % — LOW (ref 43–77)
NEUTS BAND # BLD: 6 % — SIGNIFICANT CHANGE UP (ref 0–8)
NITRITE UR-MCNC: NEGATIVE — SIGNIFICANT CHANGE UP
NRBC # BLD: 18 /100 — HIGH (ref 0–0)
OTHER CELLS CSF MANUAL: 8 ML/DL — LOW (ref 18–22)
OVALOCYTES BLD QL SMEAR: SLIGHT — SIGNIFICANT CHANGE UP
PCO2 BLDV: 33 MMHG — LOW (ref 35–50)
PH BLDV: 7.47 — HIGH (ref 7.35–7.45)
PH UR: 8 — SIGNIFICANT CHANGE UP (ref 5–8)
PHOSPHATE SERPL-MCNC: 3.6 MG/DL — SIGNIFICANT CHANGE UP (ref 2.5–4.5)
PHOSPHATE SERPL-MCNC: 4.3 MG/DL — SIGNIFICANT CHANGE UP (ref 2.5–4.5)
PHOSPHATE SERPL-MCNC: 4.7 MG/DL — HIGH (ref 2.5–4.5)
PLAT MORPH BLD: ABNORMAL
PLATELET # BLD AUTO: 107 K/UL — LOW (ref 150–400)
PLATELET # BLD AUTO: 81 K/UL — LOW (ref 150–400)
PLATELET # BLD AUTO: 86 K/UL — LOW (ref 150–400)
PLATELET # BLD AUTO: 97 K/UL — LOW (ref 150–400)
PO2 BLDV: 54 MMHG — HIGH (ref 25–45)
POLYCHROMASIA BLD QL SMEAR: SLIGHT — SIGNIFICANT CHANGE UP
POTASSIUM BLDV-SCNC: 3.6 MMOL/L — SIGNIFICANT CHANGE UP (ref 3.5–5.3)
POTASSIUM SERPL-MCNC: 3.6 MMOL/L — SIGNIFICANT CHANGE UP (ref 3.5–5.3)
POTASSIUM SERPL-MCNC: 3.9 MMOL/L — SIGNIFICANT CHANGE UP (ref 3.5–5.3)
POTASSIUM SERPL-MCNC: 4 MMOL/L — SIGNIFICANT CHANGE UP (ref 3.5–5.3)
POTASSIUM SERPL-SCNC: 3.6 MMOL/L — SIGNIFICANT CHANGE UP (ref 3.5–5.3)
POTASSIUM SERPL-SCNC: 3.9 MMOL/L — SIGNIFICANT CHANGE UP (ref 3.5–5.3)
POTASSIUM SERPL-SCNC: 4 MMOL/L — SIGNIFICANT CHANGE UP (ref 3.5–5.3)
PROMYELOCYTES # FLD: 2 % — HIGH (ref 0–0)
PROT SERPL-MCNC: 5.7 G/DL — LOW (ref 6–8.3)
PROT UR-MCNC: ABNORMAL
PROTHROM AB SERPL-ACNC: 13.3 SEC — HIGH (ref 10–12.9)
PROTHROM AB SERPL-ACNC: 13.8 SEC — HIGH (ref 10–13.1)
RAPID RVP RESULT: SIGNIFICANT CHANGE UP
RBC # BLD: 2.48 M/UL — LOW (ref 4.2–5.8)
RBC # BLD: 2.6 M/UL — LOW (ref 4.2–5.8)
RBC # BLD: 2.67 M/UL — LOW (ref 4.2–5.8)
RBC # FLD: 19.3 % — HIGH (ref 10.3–14.5)
RBC # FLD: 19.3 % — HIGH (ref 10.3–14.5)
RBC # FLD: 19.6 % — HIGH (ref 10.3–14.5)
RBC BLD AUTO: ABNORMAL
RBC CASTS # UR COMP ASSIST: 1 /HPF — SIGNIFICANT CHANGE UP (ref 0–4)
RH IG SCN BLD-IMP: POSITIVE — SIGNIFICANT CHANGE UP
RSV RNA SPEC QL NAA+PROBE: SIGNIFICANT CHANGE UP
RV+EV RNA SPEC QL NAA+PROBE: SIGNIFICANT CHANGE UP
SAO2 % BLDV: 89 % — HIGH (ref 67–88)
SCHISTOCYTES BLD QL AUTO: SLIGHT — SIGNIFICANT CHANGE UP
SODIUM SERPL-SCNC: 138 MMOL/L — SIGNIFICANT CHANGE UP (ref 135–145)
SODIUM SERPL-SCNC: 138 MMOL/L — SIGNIFICANT CHANGE UP (ref 135–145)
SODIUM SERPL-SCNC: 139 MMOL/L — SIGNIFICANT CHANGE UP (ref 135–145)
SP GR SPEC: 1.01 — SIGNIFICANT CHANGE UP (ref 1.01–1.02)
SPHEROCYTES BLD QL SMEAR: SLIGHT — SIGNIFICANT CHANGE UP
TM INTERPRETATION: SIGNIFICANT CHANGE UP
TROPONIN T, HIGH SENSITIVITY RESULT: 22 NG/L — SIGNIFICANT CHANGE UP (ref 0–51)
URATE SERPL-MCNC: 5.2 MG/DL — SIGNIFICANT CHANGE UP (ref 3.4–8.8)
URATE SERPL-MCNC: 5.8 MG/DL — SIGNIFICANT CHANGE UP (ref 3.4–8.8)
UROBILINOGEN FLD QL: NEGATIVE — SIGNIFICANT CHANGE UP
VANCOMYCIN FLD-MCNC: 13.5 UG/ML — SIGNIFICANT CHANGE UP
WBC # BLD: 115.24 K/UL — CRITICAL HIGH (ref 3.8–10.5)
WBC # BLD: 118.08 K/UL — CRITICAL HIGH (ref 3.8–10.5)
WBC # BLD: 120.56 K/UL — CRITICAL HIGH (ref 3.8–10.5)
WBC # FLD AUTO: 115.24 K/UL — CRITICAL HIGH (ref 3.8–10.5)
WBC # FLD AUTO: 118.08 K/UL — CRITICAL HIGH (ref 3.8–10.5)
WBC # FLD AUTO: 120.56 K/UL — CRITICAL HIGH (ref 3.8–10.5)
WBC UR QL: 1 /HPF — SIGNIFICANT CHANGE UP (ref 0–5)

## 2019-09-23 PROCEDURE — 88341 IMHCHEM/IMCYTCHM EA ADD ANTB: CPT | Mod: 26,59

## 2019-09-23 PROCEDURE — 88360 TUMOR IMMUNOHISTOCHEM/MANUAL: CPT | Mod: 26

## 2019-09-23 PROCEDURE — 99232 SBSQ HOSP IP/OBS MODERATE 35: CPT | Mod: GC

## 2019-09-23 PROCEDURE — 99223 1ST HOSP IP/OBS HIGH 75: CPT | Mod: GC

## 2019-09-23 PROCEDURE — 12345: CPT | Mod: NC,GC

## 2019-09-23 PROCEDURE — 88189 FLOWCYTOMETRY/READ 16 & >: CPT

## 2019-09-23 PROCEDURE — 85097 BONE MARROW INTERPRETATION: CPT

## 2019-09-23 PROCEDURE — 88313 SPECIAL STAINS GROUP 2: CPT | Mod: 26

## 2019-09-23 PROCEDURE — 88342 IMHCHEM/IMCYTCHM 1ST ANTB: CPT | Mod: 26,59

## 2019-09-23 PROCEDURE — 88305 TISSUE EXAM BY PATHOLOGIST: CPT | Mod: 26

## 2019-09-23 PROCEDURE — 71275 CT ANGIOGRAPHY CHEST: CPT | Mod: 26

## 2019-09-23 RX ORDER — ASPIRIN/CALCIUM CARB/MAGNESIUM 324 MG
81 TABLET ORAL DAILY
Refills: 0 | Status: DISCONTINUED | OUTPATIENT
Start: 2019-09-23 | End: 2019-09-24

## 2019-09-23 RX ORDER — TAMSULOSIN HYDROCHLORIDE 0.4 MG/1
0.4 CAPSULE ORAL AT BEDTIME
Refills: 0 | Status: DISCONTINUED | OUTPATIENT
Start: 2019-09-23 | End: 2019-10-02

## 2019-09-23 RX ORDER — CILOSTAZOL 100 MG/1
50 TABLET ORAL
Refills: 0 | Status: DISCONTINUED | OUTPATIENT
Start: 2019-09-23 | End: 2019-09-23

## 2019-09-23 RX ORDER — FINASTERIDE 5 MG/1
5 TABLET, FILM COATED ORAL DAILY
Refills: 0 | Status: DISCONTINUED | OUTPATIENT
Start: 2019-09-23 | End: 2019-10-02

## 2019-09-23 RX ORDER — ACETAMINOPHEN 500 MG
650 TABLET ORAL EVERY 6 HOURS
Refills: 0 | Status: DISCONTINUED | OUTPATIENT
Start: 2019-09-23 | End: 2019-10-02

## 2019-09-23 RX ORDER — VANCOMYCIN HCL 1 G
500 VIAL (EA) INTRAVENOUS ONCE
Refills: 0 | Status: COMPLETED | OUTPATIENT
Start: 2019-09-23 | End: 2019-09-23

## 2019-09-23 RX ORDER — DEXTROSE 50 % IN WATER 50 %
12.5 SYRINGE (ML) INTRAVENOUS ONCE
Refills: 0 | Status: DISCONTINUED | OUTPATIENT
Start: 2019-09-23 | End: 2019-10-02

## 2019-09-23 RX ORDER — SIMVASTATIN 20 MG/1
10 TABLET, FILM COATED ORAL AT BEDTIME
Refills: 0 | Status: DISCONTINUED | OUTPATIENT
Start: 2019-09-23 | End: 2019-10-02

## 2019-09-23 RX ORDER — HYDROXYUREA 500 MG/1
1000 CAPSULE ORAL
Refills: 0 | Status: DISCONTINUED | OUTPATIENT
Start: 2019-09-23 | End: 2019-09-23

## 2019-09-23 RX ORDER — DEXTROSE 50 % IN WATER 50 %
25 SYRINGE (ML) INTRAVENOUS ONCE
Refills: 0 | Status: DISCONTINUED | OUTPATIENT
Start: 2019-09-23 | End: 2019-10-02

## 2019-09-23 RX ORDER — ZOLPIDEM TARTRATE 10 MG/1
5 TABLET ORAL AT BEDTIME
Refills: 0 | Status: DISCONTINUED | OUTPATIENT
Start: 2019-09-23 | End: 2019-09-23

## 2019-09-23 RX ORDER — ZOLPIDEM TARTRATE 10 MG/1
5 TABLET ORAL AT BEDTIME
Refills: 0 | Status: DISCONTINUED | OUTPATIENT
Start: 2019-09-23 | End: 2019-09-30

## 2019-09-23 RX ORDER — INSULIN LISPRO 100/ML
VIAL (ML) SUBCUTANEOUS AT BEDTIME
Refills: 0 | Status: DISCONTINUED | OUTPATIENT
Start: 2019-09-23 | End: 2019-10-02

## 2019-09-23 RX ORDER — DEXTROSE 50 % IN WATER 50 %
15 SYRINGE (ML) INTRAVENOUS ONCE
Refills: 0 | Status: DISCONTINUED | OUTPATIENT
Start: 2019-09-23 | End: 2019-10-02

## 2019-09-23 RX ORDER — NICOTINE POLACRILEX 2 MG
1 GUM BUCCAL DAILY
Refills: 0 | Status: DISCONTINUED | OUTPATIENT
Start: 2019-09-23 | End: 2019-10-02

## 2019-09-23 RX ORDER — ALLOPURINOL 300 MG
300 TABLET ORAL DAILY
Refills: 0 | Status: DISCONTINUED | OUTPATIENT
Start: 2019-09-23 | End: 2019-09-23

## 2019-09-23 RX ORDER — MORPHINE SULFATE 50 MG/1
2 CAPSULE, EXTENDED RELEASE ORAL EVERY 4 HOURS
Refills: 0 | Status: DISCONTINUED | OUTPATIENT
Start: 2019-09-23 | End: 2019-09-29

## 2019-09-23 RX ORDER — LIDOCAINE HCL 20 MG/ML
1 VIAL (ML) INJECTION ONCE
Refills: 0 | Status: DISCONTINUED | OUTPATIENT
Start: 2019-09-23 | End: 2019-10-02

## 2019-09-23 RX ORDER — FLUCONAZOLE 150 MG/1
200 TABLET ORAL EVERY 24 HOURS
Refills: 0 | Status: DISCONTINUED | OUTPATIENT
Start: 2019-09-24 | End: 2019-09-24

## 2019-09-23 RX ORDER — GLUCAGON INJECTION, SOLUTION 0.5 MG/.1ML
1 INJECTION, SOLUTION SUBCUTANEOUS ONCE
Refills: 0 | Status: DISCONTINUED | OUTPATIENT
Start: 2019-09-23 | End: 2019-10-02

## 2019-09-23 RX ORDER — LANOLIN ALCOHOL/MO/W.PET/CERES
5 CREAM (GRAM) TOPICAL AT BEDTIME
Refills: 0 | Status: DISCONTINUED | OUTPATIENT
Start: 2019-09-23 | End: 2019-10-02

## 2019-09-23 RX ORDER — METOPROLOL TARTRATE 50 MG
1 TABLET ORAL
Qty: 0 | Refills: 0 | DISCHARGE

## 2019-09-23 RX ORDER — ALLOPURINOL 300 MG
300 TABLET ORAL DAILY
Refills: 0 | Status: DISCONTINUED | OUTPATIENT
Start: 2019-09-23 | End: 2019-09-25

## 2019-09-23 RX ORDER — FLUCONAZOLE 150 MG/1
TABLET ORAL
Refills: 0 | Status: DISCONTINUED | OUTPATIENT
Start: 2019-09-23 | End: 2019-09-24

## 2019-09-23 RX ORDER — FLUCONAZOLE 150 MG/1
200 TABLET ORAL ONCE
Refills: 0 | Status: COMPLETED | OUTPATIENT
Start: 2019-09-23 | End: 2019-09-23

## 2019-09-23 RX ORDER — PIPERACILLIN AND TAZOBACTAM 4; .5 G/20ML; G/20ML
3.38 INJECTION, POWDER, LYOPHILIZED, FOR SOLUTION INTRAVENOUS EVERY 8 HOURS
Refills: 0 | Status: DISCONTINUED | OUTPATIENT
Start: 2019-09-23 | End: 2019-09-24

## 2019-09-23 RX ORDER — HEPARIN SODIUM 5000 [USP'U]/ML
5000 INJECTION INTRAVENOUS; SUBCUTANEOUS EVERY 8 HOURS
Refills: 0 | Status: DISCONTINUED | OUTPATIENT
Start: 2019-09-23 | End: 2019-09-24

## 2019-09-23 RX ORDER — INSULIN LISPRO 100/ML
VIAL (ML) SUBCUTANEOUS
Refills: 0 | Status: DISCONTINUED | OUTPATIENT
Start: 2019-09-23 | End: 2019-10-02

## 2019-09-23 RX ORDER — SIMVASTATIN 20 MG/1
1 TABLET, FILM COATED ORAL
Qty: 0 | Refills: 0 | DISCHARGE

## 2019-09-23 RX ORDER — HYDROXYUREA 500 MG/1
1000 CAPSULE ORAL EVERY 12 HOURS
Refills: 0 | Status: DISCONTINUED | OUTPATIENT
Start: 2019-09-23 | End: 2019-09-24

## 2019-09-23 RX ORDER — SODIUM CHLORIDE 9 MG/ML
250 INJECTION INTRAMUSCULAR; INTRAVENOUS; SUBCUTANEOUS ONCE
Refills: 0 | Status: COMPLETED | OUTPATIENT
Start: 2019-09-23 | End: 2019-09-23

## 2019-09-23 RX ORDER — SODIUM CHLORIDE 9 MG/ML
1000 INJECTION, SOLUTION INTRAVENOUS
Refills: 0 | Status: DISCONTINUED | OUTPATIENT
Start: 2019-09-23 | End: 2019-10-02

## 2019-09-23 RX ORDER — ACETAMINOPHEN 500 MG
650 TABLET ORAL EVERY 6 HOURS
Refills: 0 | Status: DISCONTINUED | OUTPATIENT
Start: 2019-09-23 | End: 2019-09-23

## 2019-09-23 RX ORDER — INFLUENZA VIRUS VACCINE 15; 15; 15; 15 UG/.5ML; UG/.5ML; UG/.5ML; UG/.5ML
0.5 SUSPENSION INTRAMUSCULAR ONCE
Refills: 0 | Status: DISCONTINUED | OUTPATIENT
Start: 2019-09-23 | End: 2019-10-02

## 2019-09-23 RX ADMIN — MORPHINE SULFATE 2 MILLIGRAM(S): 50 CAPSULE, EXTENDED RELEASE ORAL at 14:59

## 2019-09-23 RX ADMIN — FINASTERIDE 5 MILLIGRAM(S): 5 TABLET, FILM COATED ORAL at 11:53

## 2019-09-23 RX ADMIN — MORPHINE SULFATE 2 MILLIGRAM(S): 50 CAPSULE, EXTENDED RELEASE ORAL at 15:15

## 2019-09-23 RX ADMIN — SODIUM CHLORIDE 250 MILLILITER(S): 9 INJECTION INTRAMUSCULAR; INTRAVENOUS; SUBCUTANEOUS at 01:46

## 2019-09-23 RX ADMIN — PIPERACILLIN AND TAZOBACTAM 25 GRAM(S): 4; .5 INJECTION, POWDER, LYOPHILIZED, FOR SOLUTION INTRAVENOUS at 15:00

## 2019-09-23 RX ADMIN — HEPARIN SODIUM 5000 UNIT(S): 5000 INJECTION INTRAVENOUS; SUBCUTANEOUS at 05:27

## 2019-09-23 RX ADMIN — TAMSULOSIN HYDROCHLORIDE 0.4 MILLIGRAM(S): 0.4 CAPSULE ORAL at 21:48

## 2019-09-23 RX ADMIN — HYDROXYUREA 1000 MILLIGRAM(S): 500 CAPSULE ORAL at 06:36

## 2019-09-23 RX ADMIN — Medication 300 MILLIGRAM(S): at 06:10

## 2019-09-23 RX ADMIN — SIMVASTATIN 10 MILLIGRAM(S): 20 TABLET, FILM COATED ORAL at 22:49

## 2019-09-23 RX ADMIN — SODIUM CHLORIDE 250 MILLILITER(S): 9 INJECTION INTRAMUSCULAR; INTRAVENOUS; SUBCUTANEOUS at 01:48

## 2019-09-23 RX ADMIN — PIPERACILLIN AND TAZOBACTAM 25 GRAM(S): 4; .5 INJECTION, POWDER, LYOPHILIZED, FOR SOLUTION INTRAVENOUS at 08:12

## 2019-09-23 RX ADMIN — Medication 650 MILLIGRAM(S): at 12:13

## 2019-09-23 RX ADMIN — Medication 5 MILLIGRAM(S): at 05:31

## 2019-09-23 RX ADMIN — HYDROXYUREA 1000 MILLIGRAM(S): 500 CAPSULE ORAL at 17:26

## 2019-09-23 RX ADMIN — FLUCONAZOLE 100 MILLIGRAM(S): 150 TABLET ORAL at 11:53

## 2019-09-23 RX ADMIN — Medication 100 MILLIGRAM(S): at 13:31

## 2019-09-23 RX ADMIN — Medication 81 MILLIGRAM(S): at 11:53

## 2019-09-23 RX ADMIN — Medication 650 MILLIGRAM(S): at 11:09

## 2019-09-23 RX ADMIN — HEPARIN SODIUM 5000 UNIT(S): 5000 INJECTION INTRAVENOUS; SUBCUTANEOUS at 13:31

## 2019-09-23 RX ADMIN — ZOLPIDEM TARTRATE 5 MILLIGRAM(S): 10 TABLET ORAL at 23:15

## 2019-09-23 RX ADMIN — PIPERACILLIN AND TAZOBACTAM 25 GRAM(S): 4; .5 INJECTION, POWDER, LYOPHILIZED, FOR SOLUTION INTRAVENOUS at 23:15

## 2019-09-23 RX ADMIN — HEPARIN SODIUM 5000 UNIT(S): 5000 INJECTION INTRAVENOUS; SUBCUTANEOUS at 21:48

## 2019-09-23 NOTE — PROGRESS NOTE ADULT - PROBLEM SELECTOR PLAN 7
- Cr 1.54, baseline around 1 in 2017   - JOE likely in the setting of infection and hypotension   - trend Cr, avoid nephrotoxins

## 2019-09-23 NOTE — H&P ADULT - PROBLEM SELECTOR PLAN 1
- SOB multifactorial, 2/2 lung mass, PNA and CHF exacerbation   - was fluid overload on exam with 2+ b/l pitting edema to the knee, diffuse crackles on lung exam   - given borderline hypotension, will hold lasix, TTE ordered   - EKG showed NSR, no ST elevation/depression, T wave inversion, negative troponin   - CT angio negative for PE, but showed 4.7cm mass in EMILE, and 1.3cm mass in RUL, Mild right lower lobe pneumonia/aspiration  - currently does not require supplement O2, can give supplement O2 PRN   - VBG showed pH 7.47, lactate of 5.2 - SOB multifactorial, 2/2 lung mass, PNA and CHF exacerbation   - was fluid overload on exam with 2+ b/l pitting edema to the knee, diffuse crackles on lung exam   - given borderline hypotension, will hold lasix, TTE ordered   - EKG showed NSR, no ST elevation/depression, T wave inversion, negative troponin   - CT angio negative for PE, but showed 4.7cm mass in EMILE, and 1.3cm mass in RUL, Mild right lower lobe pneumonia/aspiration  - currently does not require supplement O2, can give supplement O2 PRN   - VBG showed pH 7.47, lactate of 5.2  - c/w vanco and zosyn for PNA - SOB multifactorial, 2/2 lung mass, PNA and CHF exacerbation   - was fluid overload on exam with 2+ b/l pitting edema to the knee, diffuse crackles on lung exam   - given borderline hypotension, will hold lasix, TTE ordered   - EKG showed NSR, no ST elevation/depression, T wave inversion, negative troponin   - CT angio negative for PE, but showed 4.7cm mass in EMILE, and 1.3cm mass in RUL, Mild right lower lobe pneumonia/aspiration  - currently does not require supplement O2, can give supplement O2 PRN   - VBG showed pH 7.47, lactate of 5.2  - c/w vanco and zosyn for PNA, vanco bilevel - SOB multifactorial, 2/2 lung cancer, PNA, HF, and acute leukemia  - was fluid overload on exam with 2+ b/l pitting edema to the knee, diffuse crackles on lung exam   - given borderline hypotension, will hold lasix, TTE ordered   - EKG showed NSR, no ST elevation/depression, T wave inversion, negative troponin   - CT angio negative for PE, but showed 4.7cm mass in EMILE, and 1.3cm mass in RUL, Mild right lower lobe pneumonia/aspiration  - currently does not require supplement O2, can give supplement O2 PRN   - VBG showed pH 7.47, lactate of 5.2  - c/w vanco and zosyn for PNA, vanco bilevel

## 2019-09-23 NOTE — PATIENT PROFILE ADULT - TOBACCO CESSATION EDUCATION/COUNSELLING(PROVIDED IF TOBACCO USED IN THE PAST 30 DAYS- CORE MEASURE SITES)
Regular rate and rhythm, Heart sounds S1 S2 present, no murmurs, rubs or gallops Offered and patient declined

## 2019-09-23 NOTE — PROGRESS NOTE ADULT - PROBLEM SELECTOR PLAN 5
- clinically fluid overloaded with 2+ b/l pitting edema, and diffuse b/l crackles   - albumin wnl   - given soft BP, will hold Lasix   - low salt diet, daily weight, trend BMP   - TTE ordered

## 2019-09-23 NOTE — PROGRESS NOTE ADULT - ASSESSMENT
Mandarin speaking 84 M with hx of DM2, HTN, CAD s/p stents, newly dx lung cancer (in [place], not on tx), with liver mets p/w SOB, weakness found to have leukocytosis with wbc 129 and 20% blasts.    # Acute leukemia  - WBC of 129  with 20% blasts, 11% metameylocytes, 7% meylocytes  - Pt reports that he had leukocytosis (40k) in the past, had BM bx in [place] about 2 years ago. He was told that no treatment was required at that time. No report here.  -peripheral smear reviewed (9/23): numerous blasts, metamyelocytes, myelocytes, nucleated RBCs, some clumped platelets.   -Low suspicion of APL with no visible blasts with bilobed nucleus  and normal coags  -no signs of leukostasis currently; blasts ~40K, does not need emergent leukophoresis  -Phos, uric acid within normal limit, can start allopurinol 300 mg qd with gentle hydration. (no need for rasburicase)   -would start hydrea1 gm BID stat  -needs bone marrow biopsy in a.m  -will need MUGA anticipation of chemotherapy (pt is agreeable to treatment)  -monitor labs q12h CBC w/diff, TLS, DIC panel, CMP,   -will send peripheral flow cytometry (tube, requisition form given to nurse)  - transfuse to keep Hg>7 and plts >10 or 15 if febrile    # Lung cancer with liver mets  -recently (late Aug) diagnosed in [facility] by lung mass bx (no liver bx)  -pt has not received any treatment  -wife will bring records/reports in the morning  -will likely need to manage acute leukemia first before treating lung cancer      d/w Dr. Rowe (Heme-onc attending)    Melvin Morillo MD. PGY 5  Heme-Onc fellow  [phone]; [id number] Mandarin speaking 84 M with hx of DM2, HTN, CAD s/p stents, newly dx lung cancer (in [place], not on tx), with liver mets p/w SOB, weakness found to have leukocytosis with wbc 129 and 20% blasts.    #Acute leukemia  - WBC of 129  with 20% blasts, 11% metameylocytes, 7% meylocytes  - Pt reports that he had leukocytosis (40k) in the past, had BM bx in [place] about 2 years ago. He was told that no treatment was required at that time. No report here.  -peripheral smear reviewed (9/23): numerous blasts, metamyelocytes, myelocytes, nucleated RBCs, some clumped platelets.   -Low suspicion of APL with no visible blasts with bilobed nucleus  and normal coags  -no signs of leukostasis currently; blasts ~40K, does not need emergent leukophoresis  -c/w allopurinol 300 mg qd  -IVF hydration  -c/w hydrea1 gm BID   -BM biopsy done on 9/23, foundation testing also sent  -monitor labs q12h CBC w/diff, TLS, DIC panel, CMP  -fu peripheral flow cytometry  -transfuse to keep Hg>7 and plts >10 or 15 if febrile    #Lung cancer with liver mets  -recently (late Aug) diagnosed in [place] by lung mass bx (no liver bx)  -pt has not received any treatment; after rediscussing today, per wife, may be difficult to obtain records  -will likely need to manage acute leukemia first before treating lung cancer  -C discussion initiated today; per wife, she is unsure if he would like to pursue treatment    Pt transferred to [facility] and will resume care by [facility] team. Accepted by Dr. Cabral.     Cecelia Headley  Hematology Fellow  [phone]

## 2019-09-23 NOTE — PROGRESS NOTE ADULT - PROBLEM SELECTOR PLAN 9
- BP soft, will hold h ome amlodipine and bisoprolol    # Splenic infarct on CT angio   - per pt and family, aware of the infarct for many years  - f/u vascular surgery recs - BP soft, will hold h ome amlodipine and bisoprolol    # Splenic infarct on CT angio   -Vascular recommends venous phase IV contrast CT abdomen; will wait 24 hours since patient just had CT angio and has current JOE    #Abdominal pain  -Likely secondary to mets/infarcts  -Will trend exams  -Pending CT as above  -Morphine 2mg IV q4h PRN for pain  - per pt and family, aware of the infarct for many years  - f/u vascular surgery recs

## 2019-09-23 NOTE — PROGRESS NOTE ADULT - ATTENDING COMMENTS
84/m with long standing history of abnormal CBC (since 2012), now presented with blast crisis. Smear s/o CML with blast phase but flow is pending to confirm. He underwent a BMBX today. He also noted to have Large EMILE lung mass, smaller Right lung nodule and findings concerning for liver mets. I had extensive discussion with the pt/family (wife and son at bedside) about prognosis and treatment options for secondary acute leukemia as well as metastatic lung cca. Family wants to continue diagnostic testing and want to pursue treatment for hematological disorder but unsure if they want treatment for lung cancer. Poor prognosis of untreated stage IV lung ca was discussed. Continue GOC discussion.

## 2019-09-23 NOTE — PROGRESS NOTE ADULT - SUBJECTIVE AND OBJECTIVE BOX
INTERVAL HPI/OVERNIGHT EVENTS:  Patient S&E at bedside. Family at bedside.  Complains of SOB unchanged, non-productive cough, vision unchanged.  Complains of lower abdominal pain, dull, feels gaseous.  Had BM yesterday, no urinary symptoms.      VITAL SIGNS:  T(F): 100.3 (19 @ 17:41)  HR: 91 (19 @ 17:41)  BP: 142/63 (19 @ 17:41)  RR: 16 (19 @ 17:41)  SpO2: 98% (19 @ 17:41)  Wt(kg): --    PHYSICAL EXAM:  Constitutional: NAD  Eyes: EOMI, sclera non-icteric  Neck: supple, no masses, no JVD  Respiratory: CTA b/l, good air entry b/l  Cardiovascular: RRR, no M/R/G  Gastrointestinal: soft, NTND, no masses palpable, + BS, no hepatosplenomegaly  Extremities: no c/c/e  Neurological: AAOx3    MEDICATIONS  (STANDING):  allopurinol 300 milliGRAM(s) Oral daily  aspirin  chewable 81 milliGRAM(s) Oral daily  dextrose 5%. 1000 milliLiter(s) (50 mL/Hr) IV Continuous <Continuous>  dextrose 50% Injectable 12.5 Gram(s) IV Push once  dextrose 50% Injectable 25 Gram(s) IV Push once  dextrose 50% Injectable 25 Gram(s) IV Push once  finasteride 5 milliGRAM(s) Oral daily  fluconAZOLE IVPB      heparin  Injectable 5000 Unit(s) SubCutaneous every 8 hours  hydroxyurea 1000 milliGRAM(s) Oral every 12 hours  influenza   Vaccine 0.5 milliLiter(s) IntraMuscular once  insulin lispro (HumaLOG) corrective regimen sliding scale   SubCutaneous three times a day before meals  insulin lispro (HumaLOG) corrective regimen sliding scale   SubCutaneous at bedtime  lidocaine 2% Injectable 1 Vial(s) Local Injection once  melatonin 5 milliGRAM(s) Oral at bedtime  nicotine - 21 mG/24Hr(s) Patch 1 patch Transdermal daily  piperacillin/tazobactam IVPB.. 3.375 Gram(s) IV Intermittent every 8 hours  simvastatin 10 milliGRAM(s) Oral at bedtime  tamsulosin 0.4 milliGRAM(s) Oral at bedtime    MEDICATIONS  (PRN):  acetaminophen   Tablet .. 650 milliGRAM(s) Oral every 6 hours PRN Temp greater or equal to 38C (100.4F), Moderate Pain (4 - 6), Severe Pain (7 - 10)  dextrose 40% Gel 15 Gram(s) Oral once PRN Blood Glucose LESS THAN 70 milliGRAM(s)/deciliter  glucagon  Injectable 1 milliGRAM(s) IntraMuscular once PRN Glucose LESS THAN 70 milligrams/deciliter  morphine  - Injectable 2 milliGRAM(s) IV Push every 4 hours PRN Moderate Pain (4 - 6)      Allergies  No Known Allergies    Intolerances        LABS:                        7.2    118.08 )-----------( 97       ( 23 Sep 2019 14:26 )             23.7         139  |  102  |  21  ----------------------------<  76  3.9   |  24  |  1.56<H>    Ca    8.9      23 Sep 2019 11:19  Phos  4.3       Mg     1.9         TPro  5.7<L>  /  Alb  3.4  /  TBili  0.7  /  DBili  x   /  AST  27  /  ALT  13  /  AlkPhos  111      PT/INR - ( 23 Sep 2019 06:08 )   PT: 13.3 sec;   INR: 1.15 ratio         PTT - ( 23 Sep 2019 11:19 )  PTT:35.8 sec  Urinalysis Basic - ( 23 Sep 2019 06:09 )    Color: Colorless / Appearance: Clear / S.015 / pH: x  Gluc: x / Ketone: Negative  / Bili: Negative / Urobili: Negative   Blood: x / Protein: 30 mg/dL / Nitrite: Negative   Leuk Esterase: Negative / RBC: 1 /hpf / WBC 1 /HPF   Sq Epi: x / Non Sq Epi: 0 /hpf / Bacteria: Negative        RADIOLOGY & ADDITIONAL TESTS:  Studies reviewed.    ASSESSMENT & PLAN:

## 2019-09-23 NOTE — H&P ADULT - PROBLEM SELECTOR PLAN 7
- recent dx of lung mass in [place], pt does not have bx results with him, will likely need lung bx   - f/u oncology recs - recent dx of lung mass in [facility], pt does not have bx results with him, will likely need another bx   - oncology in AM - Cr 1.54, baseline around 1 in 2017   - JOE likely in the setting of infection and hypotension   - trend Cr, avoid nephrotoxins

## 2019-09-23 NOTE — H&P ADULT - NSICDXPASTMEDICALHX_GEN_ALL_CORE_FT
PAST MEDICAL HISTORY:  Diabetes mellitus     High cholesterol     HTN (hypertension)     Lung cancer

## 2019-09-23 NOTE — H&P ADULT - PROBLEM SELECTOR PLAN 5
- Cr 1.54, baseline around 1  - JOE likely in the setting of infection   - trend Cr, avoid nephrotoxins - Cr 1.54, baseline around 1 in 2017   - JOE likely in the setting of infection and hypotension   - trend Cr, avoid nephrotoxins - clinically fluid overloaded with 2+ b/l pitting edema, and diffuse b/l crackles   - albumin wnl   - given soft BP, will hold Lasix   - low salt diet, daily weight, trend BMP   - TTE ordered

## 2019-09-23 NOTE — H&P ADULT - ATTENDING COMMENTS
Patient assigned to me by night hospitalist in charge for management and care for patient for this evening only. Care to be resumed by day hospitalist at 08:00 in the morning and thereafter.    Discussed with Dr. Pugh (fluent in Mandarin), also used # [id number]  84M mandarin-speaking w/ stage IV lung ca w/ met to liver, reported chronic splenic infarct?, hx of CVA w/o deficit, DM2, HTN presents for SOB and found to acute leukemia, severe sepsis 2/2 pneumonia, Stage IV lung ca and suspicion for acute heart failure (elevated BNP with edema).  #SOB: etiology at this time appears to be multifactorial as above, at time of my exam following antibiotics does not appear to be toxic and w/ normal HR and normal RR.  #Acute Leukemia: Heme fellow note reviewed. monitor for TLS, start allopurinol and hydroxyurea per recs. Plan for BM.  #Severe Sepsis 2/2 PNA: tachycardia+tachypnea w/ CT chest c/w RLL pna. c/w vanc by level and zosyn. lactate improving w/ antibiotics. blood culture collected. HOLD antihypertensives and NO zolpidem in this acute time period.  #acute heart failure: elevated BNP, peripheral edema. Will need TTE to further evaluate. hold home cilostazol. trop neg. EKG w/o STEMI.  #Tachycardia: difficult to discern underlying rhythm however resolved on exam. monitor on tele.  #JOE: monitor w/ BMP, etiology suspected multifactorial given relative hypotension, severe sepsis, elevated BNP.  #Stage IV lung cancer w/ met to liver: per heme/onc  #Splenic infarct: vascular note reviewed w/ attending eval pending. per Dr. Pugh, patient's family reports chronic disease?  #HTN: hold antihypertensives    Per Dr. Pugh, patient and family indicate desire for full resuscitation and evaluation for chemotherapy.

## 2019-09-23 NOTE — PROGRESS NOTE ADULT - PROBLEM SELECTOR PLAN 8
- recent dx of lung mass in [facility], pt does not have bx results with him, will likely need another bx   - oncology in AM - recent dx of lung mass in [place], pt does not have bx results with him, will likely need another bx   - oncology in AM; appreciates recs

## 2019-09-23 NOTE — CONSULT NOTE ADULT - ASSESSMENT
Mandarin speaking 84 M with hx of DM2, HTN, CAD s/p stents, newly dx lung cancer (in [place], not on tx), with liver mets p/w SOB, weakness found to have leukocytosis with wbc 129 and 20% blasts.    # Acute leukemia  - WBC of 129  with 20% blasts, 11% metameylocytes, 7% meylocytes  - Pt reports that he had leukocytosis (40k) in the past, had BM bx in [place] about 2 years ago. He was told that no treatment was required at that time.   -Wife will bring records in the a.m.  -peripheral smear reviewed (9/23): numerous blasts, metamyelocytes, myelocytes, nucleated RBCs, some clumped platelets.   -Low suspicious of APL with no visible blasts with bilobed nucleus  and normal coags  -no signs of leukostasis currently; does not need emergent leukophoresis  -Phos, uric acid within normal limit, can start allopurinol 300 mg qd with gentle hydration. (no need for rasburicase)   -would start hydrea1 gm BID stat  -needs bone marrow biopsy in a.m  -will need MUGA anticipation of chemotherapy (pt is agreeable to treatment)  -monitor labs q12h CBC w/diff, TLS, DIC panel, CMP,   -will send peripheral flow cytometry (tube, requisition form given to nurse)  - transfuse to keep Hg>7 and plts >10 or 15 if febrile    # Lung cancer with liver mets  -recently (late Aug) diagnosed in [facility] by lung mass bx (no liver bx)  -pt has not received any treatment  -wife will bring records/reports in the morning  -will likely need to manage acute leukemia first before treating lung cancer      d/w Dr. Rowe (Heme-onc attending)    Melvin Morillo MD. PGY 5  Heme-Onc fellow  [phone]; [id number]

## 2019-09-23 NOTE — H&P ADULT - PROBLEM SELECTOR PLAN 6
- clinically fluid overloaded with 2+ b/l pitting edema, and diffuse b/l crackles   - albumin wnl   - given soft BP, will hold Lasix   - low salt diet, daily weight, trend BMP   - TTE ordered now resolved however initial rhythm difficult to discern  monitor on tele  trop neg, elevated BNP, sepsis, and acute leukemia

## 2019-09-23 NOTE — PROGRESS NOTE ADULT - PROBLEM SELECTOR PLAN 2
Heme consulted  - per rec start allopurinol 300d and hydroxyurea 1g BID  - pending bone marrow later in am

## 2019-09-23 NOTE — CHART NOTE - NSCHARTNOTEFT_GEN_A_CORE
TO BE COMPLETED WITHIN 6 HOURS OF INITIAL ASSESSMENT:    For use in patients that have 2 sepsis criteria and new organ dysfunction   •	New or increased oxygen requirement  •	Creatinine >2mg/dL  •	Bilirubin>2mg/dL  •	Platelet <100,00/mm3  •	INR >1.5, PTT>60  •	Lactate >2    If patient persistent hypotension (SBP<90) or any lactate >4 then provider evaluation (Physician/PA/NP) within 30 minutes of bolus completion is required.    Vital Signs Last 24 Hrs  T(C): 36.9 (23 Sep 2019 03:10), Max: 38.1 (22 Sep 2019 22:55)  T(F): 98.4 (23 Sep 2019 03:10), Max: 100.6 (22 Sep 2019 22:55)  HR: 81 (23 Sep 2019 03:10) (81 - 129)  BP: 118/46 (23 Sep 2019 03:10) (95/42 - 125/52)  BP(mean): --  RR: 20 (23 Sep 2019 03:10) (20 - 24)  SpO2: 95% (23 Sep 2019 03:10) (94% - 97%)  		  LUNGS:  [  ]Clear bilaterally [  ] Wheeze [  ] Rhonchi [  ] Rales [ X ] Crackles; Other:  HEART: [ X ]RRR [  ] No murmur[  ]  Normal S1S2[  ] Tachycardia;  Other:  CAPILLARY REFULL:  	Fingers: [ X ] less than 2 seconds [  ] more than 2 seconds                                           Toes: [  ]  less than 2 seconds [  ] more than 2 seconds   PERIPHERAL PULSES:  Radial: [  ] Palpable  [  ]  non-palpable                                         Dorsalis Pedis: [ X ] Palpable  [  ] non-palpable                                         Posterior Tibial: [  ] Palpable  [  ] non-palpable                                          Other:  SKIN:   [  ]  Diaphoretic  [  ]  mottling  [  ]  Cold extremities  [ X ]  Warm [  ]  Dry                      Other:    BEDSIDE ULTRASOUND FINDINGS (IF APPLICABLE):    Labs:  22 Sep 2019 23:06    135    |  98     |  25     ----------------------------<  284    4.0     |  20     |  1.54     Ca    8.9        22 Sep 2019 23:06    TPro  6.1    /  Alb  3.5    /  TBili  0.6    /  DBili  x      /  AST  34     /  ALT  17     /  AlkPhos  132    22 Sep 2019 23:06                          7.9    129.0 )-----------( 107      ( 22 Sep 2019 23:06 )             25.6     PT/INR - ( 22 Sep 2019 23:06 )   PT: 13.1 sec;   INR: 1.13 ratio         PTT - ( 22 Sep 2019 23:06 )  PTT:29.0 sec  Lactate:    Plan (orders must be placed in EMR):     [ X ]  Check Repeat Lactate   [  ]  No change in current plan  [  ]  Start Vasopressors:  [  ]  Repeat Fluid Bolus:  [  ] other:    Care Discussed with Consultants/Other Providers [ ] YES  [ ] NO

## 2019-09-23 NOTE — PROGRESS NOTE ADULT - SUBJECTIVE AND OBJECTIVE BOX
SIOBHAN EPPERSON  84y  Male    Claudia Starkey MD/PhD PGY-1  [phone]  Pager [phone]  After 7pm, please call night float    Subjective:   No acute events ON. Patient with diffuse headache this AM, which he blames on lack of sleep. Also having diffuse pains in the abdomen, particularly over liver and spleen and in periumbilical region. Also complaining of b/l leg pain. Denies n/v. Endorses watery stool before coming into ED, but none since.       Vital Signs Last 24 Hrs  T(C): 36.9 (23 Sep 2019 08:10), Max: 38.1 (22 Sep 2019 22:55)  T(F): 98.5 (23 Sep 2019 08:10), Max: 100.6 (22 Sep 2019 22:55)  HR: 84 (23 Sep 2019 08:10) (81 - 129)  BP: 107/46 (23 Sep 2019 08:10) (95/42 - 125/52)  BP(mean): --  RR: 18 (23 Sep 2019 08:10) (18 - 24)  SpO2: 95% (23 Sep 2019 08:10) (94% - 97%)    PHYSICAL EXAM:  GENERAL: NAD, thin male  HEENT - NC/AT, pupils equal and reactive to light, Moist mucous membranes. Ecchymotic sore on left buccal mucosa.  NECK: Supple, No JVD  CHEST/LUNG: B/L crackles; No rales, rhonchi, wheezing  HEART: Regular rate and rhythm; No murmurs, rubs, or gallops  ABDOMEN: Soft, tender to palpation over right and left UQ, periumbilical region. Splenomegaly. Non-distended.  EXTREMITIES:  2+ Peripheral Pulses, mild pitting edema to kness b/l  NEURO:  No Focal deficits, sensory and motor intact  SKIN: Multiple small bruises throughout.     Consultant(s) Notes Reviewed:  [x ] YES  [ ] NO  Care Discussed with Consultants/Other Providers [ x] YES  [ ] NO    LABS:                         MEDICATIONS  (STANDING):  allopurinol 300 milliGRAM(s) Oral daily  aspirin  chewable 81 milliGRAM(s) Oral daily  dextrose 5%. 1000 milliLiter(s) (50 mL/Hr) IV Continuous <Continuous>  dextrose 50% Injectable 12.5 Gram(s) IV Push once  dextrose 50% Injectable 25 Gram(s) IV Push once  dextrose 50% Injectable 25 Gram(s) IV Push once  finasteride 5 milliGRAM(s) Oral daily  heparin  Injectable 5000 Unit(s) SubCutaneous every 8 hours  hydroxyurea 1000 milliGRAM(s) Oral every 12 hours  insulin lispro (HumaLOG) corrective regimen sliding scale   SubCutaneous three times a day before meals  insulin lispro (HumaLOG) corrective regimen sliding scale   SubCutaneous at bedtime  melatonin 5 milliGRAM(s) Oral at bedtime  nicotine - 21 mG/24Hr(s) Patch 1 patch Transdermal daily  piperacillin/tazobactam IVPB.. 3.375 Gram(s) IV Intermittent every 8 hours  simvastatin 10 milliGRAM(s) Oral at bedtime  tamsulosin 0.4 milliGRAM(s) Oral at bedtime    MEDICATIONS  (PRN):  acetaminophen   Tablet .. 650 milliGRAM(s) Oral every 6 hours PRN Temp greater or equal to 38C (100.4F), Moderate Pain (4 - 6), Severe Pain (7 - 10)  dextrose 40% Gel 15 Gram(s) Oral once PRN Blood Glucose LESS THAN 70 milliGRAM(s)/deciliter  glucagon  Injectable 1 milliGRAM(s) IntraMuscular once PRN Glucose LESS THAN 70 milligrams/deciliter SIOBHAN EPPERSON  84y  Male    Claudia Starkey MD/PhD PGY-1  [phone]  Pager [phone]  After 7pm, please call night float    Subjective:   No acute events ON. Patient with diffuse headache this AM, which he blames on lack of sleep. Also having diffuse pains in the abdomen, particularly over liver and spleen and in periumbilical region. Also complaining of b/l leg pain. Denies n/v. Endorses watery stool before coming into ED, but none since.       Vital Signs Last 24 Hrs  T(C): 36.9 (23 Sep 2019 08:10), Max: 38.1 (22 Sep 2019 22:55)  T(F): 98.5 (23 Sep 2019 08:10), Max: 100.6 (22 Sep 2019 22:55)  HR: 84 (23 Sep 2019 08:10) (81 - 129)  BP: 107/46 (23 Sep 2019 08:10) (95/42 - 125/52)  BP(mean): --  RR: 18 (23 Sep 2019 08:10) (18 - 24)  SpO2: 95% (23 Sep 2019 08:10) (94% - 97%)    PHYSICAL EXAM:  GENERAL: NAD, thin male  HEENT - NC/AT, pupils equal and reactive to light, Moist mucous membranes. Ecchymotic sore on left buccal mucosa.  NECK: Supple, No JVD  CHEST/LUNG: CTAB; No rales, rhonchi, wheezing  HEART: Regular rate and rhythm; No murmurs, rubs, or gallops  ABDOMEN: Soft, tender to palpation over right and left UQ, periumbilical region. Splenomegaly. Non-distended.  EXTREMITIES:  2+ Peripheral Pulses, mild pitting edema to kness b/l  NEURO:  No Focal deficits, sensory and motor intact  SKIN: Multiple small bruises throughout.     Consultant(s) Notes Reviewed:  [x ] YES  [ ] NO  Care Discussed with Consultants/Other Providers [ x] YES  [ ] NO    LABS:                         MEDICATIONS  (STANDING):  allopurinol 300 milliGRAM(s) Oral daily  aspirin  chewable 81 milliGRAM(s) Oral daily  dextrose 5%. 1000 milliLiter(s) (50 mL/Hr) IV Continuous <Continuous>  dextrose 50% Injectable 12.5 Gram(s) IV Push once  dextrose 50% Injectable 25 Gram(s) IV Push once  dextrose 50% Injectable 25 Gram(s) IV Push once  finasteride 5 milliGRAM(s) Oral daily  heparin  Injectable 5000 Unit(s) SubCutaneous every 8 hours  hydroxyurea 1000 milliGRAM(s) Oral every 12 hours  insulin lispro (HumaLOG) corrective regimen sliding scale   SubCutaneous three times a day before meals  insulin lispro (HumaLOG) corrective regimen sliding scale   SubCutaneous at bedtime  melatonin 5 milliGRAM(s) Oral at bedtime  nicotine - 21 mG/24Hr(s) Patch 1 patch Transdermal daily  piperacillin/tazobactam IVPB.. 3.375 Gram(s) IV Intermittent every 8 hours  simvastatin 10 milliGRAM(s) Oral at bedtime  tamsulosin 0.4 milliGRAM(s) Oral at bedtime    MEDICATIONS  (PRN):  acetaminophen   Tablet .. 650 milliGRAM(s) Oral every 6 hours PRN Temp greater or equal to 38C (100.4F), Moderate Pain (4 - 6), Severe Pain (7 - 10)  dextrose 40% Gel 15 Gram(s) Oral once PRN Blood Glucose LESS THAN 70 milliGRAM(s)/deciliter  glucagon  Injectable 1 milliGRAM(s) IntraMuscular once PRN Glucose LESS THAN 70 milligrams/deciliter

## 2019-09-23 NOTE — H&P ADULT - PROBLEM SELECTOR PLAN 3
- meets severe sepsis criteria: fever, leukocytosis, lactate of 5.2 at admission (repeated 2.9), + RLL PNA/ aspiration   - s/p vancomycin x1, and zosyn x1, s/p 250mL IVF x1   - will continue zosyn for aspiration PNA coverage   - trend lactate   - f/u BCx - meets severe sepsis criteria: fever, leukocytosis, lactate of 5.2 at admission (repeated 2.9), + RLL PNA/ aspiration   - s/p vancomycin x1, and zosyn x1, s/p 250mL IVF x1  - RVP negative, f/u UA    - will continue vancomycin and zosyn   - trend lactate   - f/u BCx

## 2019-09-23 NOTE — H&P ADULT - PROBLEM SELECTOR PLAN 4
- will continue zosyn for aspiration PNA coverage   - trend lactate  - f/u BCx - will continue vancomycin and zosyn   - trend lactate  - f/u BCx

## 2019-09-23 NOTE — H&P ADULT - NSHPPHYSICALEXAM_GEN_ALL_CORE
PHYSICAL EXAM:    Vital Signs Last 24 Hrs  T(C): 36.9 (23 Sep 2019 03:10), Max: 38.1 (22 Sep 2019 22:55)  T(F): 98.4 (23 Sep 2019 03:10), Max: 100.6 (22 Sep 2019 22:55)  HR: 81 (23 Sep 2019 03:10) (81 - 129)  BP: 118/46 (23 Sep 2019 03:10) (95/42 - 125/52)  BP(mean): --  RR: 20 (23 Sep 2019 03:10) (20 - 24)  SpO2: 95% (23 Sep 2019 03:10) (94% - 97%)    General: NAD, cachetic    HEENT: NCAT.  PERRL.  EOMI.  Droopy L. eyelid,  No scleral icterus or injection. Dry MM     Neck: Supple.  Full ROM.  No JVD.  No thyromegaly. No lymphadenopathy.   Heart: RRR.  Normal S1 and S2.  No murmurs, rubs, or gallops.   Lungs: normal respiratory effort, + b/l crackles    Abdomen: BS+, soft, NT/ND.  No organomegaly.  Skin: Warm and dry.  No rashes.  Extremities: 2+ b/l pitting edema, no lubbing, or cyanosis.  2+ peripheral pulses b/l.  Musculoskeletal: No deformities.  No spinal or paraspinal tenderness.  Neuro: A&Ox3.  5/5 strength in UE and LE b/l. PHYSICAL EXAM:    Vital Signs Last 24 Hrs  T(C): 36.9 (23 Sep 2019 03:10), Max: 38.1 (22 Sep 2019 22:55)  T(F): 98.4 (23 Sep 2019 03:10), Max: 100.6 (22 Sep 2019 22:55)  HR: 81 (23 Sep 2019 03:10) (81 - 129)  BP: 118/46 (23 Sep 2019 03:10) (95/42 - 125/52)  BP(mean): --  RR: 20 (23 Sep 2019 03:10) (20 - 24)  SpO2: 95% (23 Sep 2019 03:10) (94% - 97%) on room air    General: NAD, cachetic    HEENT: NCAT.  PERRL.  EOMI.  Droopy L. eyelid,  No scleral icterus or injection. Dry MM     Neck: Supple.  Full ROM.  No JVD.  No thyromegaly. No lymphadenopathy.   Heart: RRR.  Normal S1 and S2.  No murmurs, rubs, or gallops.   Lungs: normal respiratory effort, + b/l crackles    Abdomen: BS+, soft, NT/ND.  splenomegaly present  Skin: Warm and dry.  No rashes.  Extremities: 2+ b/l pitting edema, no lubbing, or cyanosis.  2+ peripheral pulses b/l.  Musculoskeletal: No deformities.  No spinal or paraspinal tenderness.  Neuro: A&Ox3.  5/5 strength in UE and LE b/l.

## 2019-09-23 NOTE — H&P ADULT - NSHPLABSRESULTS_GEN_ALL_CORE
(09-22 @ 23:06)                      7.9  129.0 )-----------( 107                 25.6    Neutrophils = See Note (42.0%)  Lymphocytes = -- (6.0%)  Eosinophils = -- (--%)  Basophils = -- (1.0%)  Monocytes = -- (5.0%)  Bands = 6%    09-22    135  |  98  |  25<H>  ----------------------------<  284<H>  4.0   |  20<L>  |  1.54<H>    Ca    8.9      22 Sep 2019 23:06  Phos  3.1     09-23  Mg     1.9     09-23    TPro  6.1  /  Alb  3.5  /  TBili  0.6  /  DBili  x   /  AST  34  /  ALT  17  /  AlkPhos  132<H>  09-22    ( 22 Sep 2019 23:06 )   PT: 13.1 sec;   INR: 1.13 ratio;  PTT:29.0 sec    Venous Blood Gas:  09-23 @ 01:44  7.47/33/54/24/89  VBG Lactate: 2.9  Venous Blood Gas:  09-22 @ 23:08  --/--/--/--/--  VBG Lactate: 5.2    < from: CT Angio Chest w/ IV Cont (09.23.19 @ 00:09) >  IMPRESSION:   1. 4.7 cm mass in the posterior left upper lobe should be considered   primary lung cancer until proven otherwise. 1.3 cm right upper lobe pulmonary   nodule is most likely a metastasis or synchronous primary.   2. Mild right lower lobe pneumonia/aspiration.   3. Multifocal hepatic metastases.   4. Substantial splenomegaly. Multifocal large peripheral areas of   hypodensity/hypoenhancement in the spleen and he related to phase of   contrast   administration but might signify multifocal large splenic infarctions.   Consider   venous phase IV contrast enhanced CT.   5. Celiac axis and portacaval lymphadenopathy.  < end of copied text > Personally reviewed available labs, imaging and ekg  Labs  CBC Full  -  ( 22 Sep 2019 23:06 )  WBC Count : 129.0 K/uL  RBC Count : 2.73 M/uL  Hemoglobin : 7.9 g/dL  Hematocrit : 25.6 %  Platelet Count - Automated : 107 K/uL  Mean Cell Volume : 93.8 fl  Mean Cell Hemoglobin : 28.9 pg  Mean Cell Hemoglobin Concentration : 30.8 gm/dL  Auto Neutrophil # : See Note  Auto Lymphocyte # : x  Auto Monocyte # : x  Auto Eosinophil # : x  Auto Basophil # : x  Auto Neutrophil % : 42.0 %  Auto Lymphocyte % : 6.0 %  Auto Monocyte % : 5.0 %  Auto Eosinophil % : x  Auto Basophil % : 1.0 %  135  |  98  |  25<H>  ----------------------------<  284<H>  4.0   |  20<L>  |  1.54<H>  Ca    8.9      22 Sep 2019 23:06  Phos  3.1     09-23  Mg     1.9     09-23  TPro  6.1  /  Alb  3.5  /  TBili  0.6  /  DBili  x   /  AST  34  /  ALT  17  /  AlkPhos  132<H>  09-22  PT/INR - ( 22 Sep 2019 23:06 )   PT: 13.1 sec;   INR: 1.13 ratio    PTT - ( 22 Sep 2019 23:06 )  PTT:29.0 sec  01:44 - VBG - pH: 7.47  | pCO2: 33    | pO2: 54    | Lactate: 2.9    23:08 - VBG - pH:       | pCO2:       | pO2:       | Lactate: 5.2      Troponin T, High Sensitivity Result: 22:  (09.22.19 @ 23:06)  Troponin T, High Sensitivity Result: 22:(09.23.19 @ 01:44)  Serum Pro-Brain Natriuretic Peptide: 2781 pg/mL (09.22.19 @ 23:06)  Uric Acid, Serum: 7.0 mg/dL (09.23.19 @ 01:44)  Lactate Dehydrogenase, Serum: 1635: Mild hemolysis results may be falsely elevated U/L (09.23.19 @ 01:44)    Blood Gas Venous - Lactate: 5.2 mmoL/L (09.22.19 @ 23:08)  Blood Gas Venous - Lactate: 2.9 mmoL/L (09.23.19 @ 01:44)    Imaging  CT chest w/ nodules and airspace opacification which can be consisted with pneumonia  < from: CT Angio Chest w/ IV Cont (09.23.19 @ 00:09) >    FINDINGS:   Pulmonary arteries: Normal. No pulmonary emboli.   Aorta: Atherosclerotic aorta. No aneurysm or acute aortic syndrome.   Lungs: 4.7 cm mass in the posterior left upper lobe should be considered   primary lung cancer until proven otherwise. 1.3 cm right upper lobe   pulmonary   nodule is most likely a metastasis or synchronous primary. Mild right   lower   lobe pneumonia/aspiration. Multifocal scarring/subsegmental atelectasis.   Pleural space: Trace left pleural effusion.   Heart: Unremarkable. No cardiomegaly. No pericardial effusion.   Mediastinum: Esophagus is unremarkable.   Liver: Multifocal hepatic metastases.   Spleen: Substantial splenomegaly. Multifocal large peripheral areas of   hypodensity/hypoenhancement in the spleen and he related to phase of   contrast   administration but might signify multifocal large splenic infarctions.   Consider   venous phase IV contrast enhanced CT.   Lymph nodes: Celiac axis and portacaval lymphadenopathy.   Bones/joints: Severe right glenohumeral osteoarthritis.   Soft tissues: Unremarkable.     IMPRESSION:   1. 4.7 cm mass in the posterior left upper lobe should be considered   primary   lung cancer until proven otherwise. 1.3 cm right upper lobe pulmonary   nodule is   most likely a metastasis or synchronous primary.   2. Mild right lower lobe pneumonia/aspiration.   3. Multifocal hepatic metastases.   4. Substantial splenomegaly. Multifocal large peripheral areas of   hypodensity/hypoenhancement in the spleen and he related to phase of   contrast   administration but might signify multifocal large splenic infarctions.   Consider   venous phase IV contrast enhanced CT.   5. Celiac axis and portacaval lymphadenopathy.    < end of copied text >      EKG: initial EKG w/ tachycardia to 121bpm, difficult to discern underlying rhythm however does not appear to be sinus, computer read as A-flutter, no ST elevation c/w STEMI

## 2019-09-23 NOTE — CONSULT NOTE ADULT - SUBJECTIVE AND OBJECTIVE BOX
VASCULAR SURGERY CONSULT NOTE    84M with DM, HTN, CAD s/p stents, CVA in the past, known PVD s/p stents many years ago in [facility] on cilostazol presented with fevers and shortness of breath found to have leukocytosis to 129, vascular surgery consulted for Multifocal large peripheral areas of hypodensity/ hypoenhancement in the spleen to rule out splenic infarcts.     Family denies other medical problems, heme/onc documented newly diagnosed lung CA.    Patient with no abdominal pain. Has never been told he has possible splenic infarcts before. Has been tolerating a regular diet with no nausea/ vomiting/ diarrhea. Denies history of atrial fibrillation      HPI:  84y woman with DM2, HTN, CVA, newly dx lung ca w/ liver mets presents to ED with SOB and generalized weakness, (23 Sep 2019 03:03)      PAST MEDICAL & SURGICAL HISTORY:  Lung cancer  High cholesterol  HTN (hypertension)  Diabetes mellitus  No significant past surgical history      FAMILY HISTORY:  No pertinent family history in first degree relatives      SOCIAL HISTORY:    MEDICATIONS  (STANDING):  aspirin  chewable 81 milliGRAM(s) Oral daily  cilostazol 50 milliGRAM(s) Oral two times a day  dextrose 5%. 1000 milliLiter(s) (50 mL/Hr) IV Continuous <Continuous>  dextrose 50% Injectable 12.5 Gram(s) IV Push once  dextrose 50% Injectable 25 Gram(s) IV Push once  dextrose 50% Injectable 25 Gram(s) IV Push once  finasteride 5 milliGRAM(s) Oral daily  insulin lispro (HumaLOG) corrective regimen sliding scale   SubCutaneous three times a day before meals  insulin lispro (HumaLOG) corrective regimen sliding scale   SubCutaneous at bedtime  simvastatin 10 milliGRAM(s) Oral at bedtime  tamsulosin 0.4 milliGRAM(s) Oral at bedtime    MEDICATIONS  (PRN):  dextrose 40% Gel 15 Gram(s) Oral once PRN Blood Glucose LESS THAN 70 milliGRAM(s)/deciliter  glucagon  Injectable 1 milliGRAM(s) IntraMuscular once PRN Glucose LESS THAN 70 milligrams/deciliter  zolpidem 5 milliGRAM(s) Oral at bedtime PRN Insomnia    Allergies    No Known Allergies    Intolerances    PHYSICAL EXAM    Vital Signs Last 24 Hrs  T(C): 36.9 (23 Sep 2019 03:10), Max: 38.1 (22 Sep 2019 22:55)  T(F): 98.4 (23 Sep 2019 03:10), Max: 100.6 (22 Sep 2019 22:55)  HR: 81 (23 Sep 2019 03:10) (81 - 129)  BP: 118/46 (23 Sep 2019 03:10) (95/42 - 125/52)  BP(mean): --  RR: 20 (23 Sep 2019 03:10) (20 - 24)  SpO2: 95% (23 Sep 2019 03:10) (94% - 97%)  Daily Height in cm: 167.64 (22 Sep 2019 22:37)    Daily     General: WN/WD NAD  Neurology: A&Ox3, nonfocal, GILES x 4  Head:  Normocephalic, atraumatic  ENT:  Mucosa moist, no ulcerations  Neck:  Supple, no sinuses or palpable masses  Lymphatic:  No palpable cervical, supraclavicular, axillary or inguinal adenopathy  Respiratory: CTA B/L  CV: RRR, S1S2, no murmur  Abdominal: Soft, NT, ND no palpable mass  MSK: 3+ peripheral edema                          7.9    129.0 )-----------( 107      ( 22 Sep 2019 23:06 )             25.6     09-22    135  |  98  |  25<H>  ----------------------------<  284<H>  4.0   |  20<L>  |  1.54<H>    Ca    8.9      22 Sep 2019 23:06  Phos  3.1     09-23  Mg     1.9     09-23    TPro  6.1  /  Alb  3.5  /  TBili  0.6  /  DBili  x   /  AST  34  /  ALT  17  /  AlkPhos  132<H>  09-22    PT/INR - ( 22 Sep 2019 23:06 )   PT: 13.1 sec;   INR: 1.13 ratio         PTT - ( 22 Sep 2019 23:06 )  PTT:29.0 sec      IMAGING STUDIES: VASCULAR SURGERY CONSULT NOTE    84M with DM, HTN, CAD s/p stents, CVA in the past, known PVD s/p stents many years ago in [facility] on cilostazol presented with fevers and shortness of breath found to have leukocytosis to 129, vascular surgery consulted for Multifocal large peripheral areas of hypodensity/ hypoenhancement in the spleen to rule out splenic infarcts.     Family denies other medical problems, heme/onc documented newly diagnosed lung CA.    Patient with no abdominal pain. Has never been told he has possible splenic infarcts before. Has been tolerating a regular diet with no nausea/ vomiting/ diarrhea. Denies history of atrial fibrillation      HPI:  84y woman with DM2, HTN, CVA, newly dx lung ca w/ liver mets presents to ED with SOB and generalized weakness, (23 Sep 2019 03:03)      PAST MEDICAL & SURGICAL HISTORY:  Lung cancer  High cholesterol  HTN (hypertension)  Diabetes mellitus  No significant past surgical history      FAMILY HISTORY:  No pertinent family history in first degree relatives      SOCIAL HISTORY:    MEDICATIONS  (STANDING):  aspirin  chewable 81 milliGRAM(s) Oral daily  cilostazol 50 milliGRAM(s) Oral two times a day  dextrose 5%. 1000 milliLiter(s) (50 mL/Hr) IV Continuous <Continuous>  dextrose 50% Injectable 12.5 Gram(s) IV Push once  dextrose 50% Injectable 25 Gram(s) IV Push once  dextrose 50% Injectable 25 Gram(s) IV Push once  finasteride 5 milliGRAM(s) Oral daily  insulin lispro (HumaLOG) corrective regimen sliding scale   SubCutaneous three times a day before meals  insulin lispro (HumaLOG) corrective regimen sliding scale   SubCutaneous at bedtime  simvastatin 10 milliGRAM(s) Oral at bedtime  tamsulosin 0.4 milliGRAM(s) Oral at bedtime    MEDICATIONS  (PRN):  dextrose 40% Gel 15 Gram(s) Oral once PRN Blood Glucose LESS THAN 70 milliGRAM(s)/deciliter  glucagon  Injectable 1 milliGRAM(s) IntraMuscular once PRN Glucose LESS THAN 70 milligrams/deciliter  zolpidem 5 milliGRAM(s) Oral at bedtime PRN Insomnia    Allergies    No Known Allergies    Intolerances    PHYSICAL EXAM    Vital Signs Last 24 Hrs  T(C): 36.9 (23 Sep 2019 03:10), Max: 38.1 (22 Sep 2019 22:55)  T(F): 98.4 (23 Sep 2019 03:10), Max: 100.6 (22 Sep 2019 22:55)  HR: 81 (23 Sep 2019 03:10) (81 - 129)  BP: 118/46 (23 Sep 2019 03:10) (95/42 - 125/52)  BP(mean): --  RR: 20 (23 Sep 2019 03:10) (20 - 24)  SpO2: 95% (23 Sep 2019 03:10) (94% - 97%)  Daily Height in cm: 167.64 (22 Sep 2019 22:37)    Daily     General: WN/WD NAD  Neurology: A&Ox3, nonfocal, GILES x 4  Head:  Normocephalic, atraumatic  ENT:  Mucosa moist, no ulcerations  Neck:  Supple, no sinuses or palpable masses  Lymphatic:  No palpable cervical, supraclavicular, axillary or inguinal adenopathy  Respiratory: CTA B/L  CV: RRR, S1S2, no murmur  Abdominal: Soft, NT, ND no palpable mass  MSK: 3+ peripheral edema                          7.9    129.0 )-----------( 107      ( 22 Sep 2019 23:06 )             25.6     09-22    135  |  98  |  25<H>  ----------------------------<  284<H>  4.0   |  20<L>  |  1.54<H>    Ca    8.9      22 Sep 2019 23:06  Phos  3.1     09-23  Mg     1.9     09-23    TPro  6.1  /  Alb  3.5  /  TBili  0.6  /  DBili  x   /  AST  34  /  ALT  17  /  AlkPhos  132<H>  09-22    PT/INR - ( 22 Sep 2019 23:06 )   PT: 13.1 sec;   INR: 1.13 ratio         PTT - ( 22 Sep 2019 23:06 )  PTT:29.0 sec      IMAGING STUDIES:    IMPRESSION:   1. 4.7 cm mass in the posterior left upper lobe should be considered   primary   lung cancer until proven otherwise. 1.3 cm right upper lobe pulmonary   nodule is   most likely a metastasis or synchronous primary.   2. Mild right lower lobe pneumonia/aspiration.   3. Multifocal hepatic metastases.   4. Substantial splenomegaly. Multifocal large peripheral areas of   hypodensity/hypoenhancement in the spleen and he related to phase of   contrast   administration but might signify multifocal large splenic infarctions.   Consider   venous phase IV contrast enhanced CT.   5. Celiac axis and portacaval lymphadenopathy.

## 2019-09-23 NOTE — H&P ADULT - HISTORY OF PRESENT ILLNESS
84y woman with DM2, HTN, CVA, newly dx lung ca w/ liver mets presents to ED with SOB and generalized weakness, 84y man with DM2, HTN, CVA, current heavy smoker, newly dx lung ca w/ liver mets presents to ED with progressively worsening SOB and generalized weakness. Pt felt severely SOB this AM, and had fever and chills for the past couple days, prompting him to the ED. ROS is also positive for LE edema, nonproductive cough, gradual weight loss over the past year. Recently returned from [facility] 1 week ago.     In terms cancer hx, family endorsed that his WBC has always been high, in the 30k range. He had a bone marrow bx done 2  years ago in [facility], which was negative for malignancy. During his most recent trip (which is earlier this year, returned to the [facility] 1 week ago), pt had multiple CT and MRI, which showed mass the lung with metastasis to the liver, had a lung bx which was positive for malignancy, but pt does not know which type. Pt has a CD of the imaging at bedside.     ED course:   vitals: Tmax  100.6, sinus tachy 129, BP 90-120s/40s-50s, sat well on RA   s/p vancomycin x1 and zosyn x1, 250ml of IVF x1 84y man with DM2, HTN, CVA, CAD s/p stent, PVD, BPH, current heavy smoker, newly dx lung ca w/ liver mets presents to ED with progressively worsening SOB and generalized weakness. Pt felt severely SOB this AM, and had fever and chills for the past couple days, prompting him to the ED. ROS is also positive for LE edema, nonproductive cough, gradual weight loss over the past year. Recently returned from [facility] 1 week ago.     In terms cancer hx, family endorsed that his WBC has always been high, in the 30k range. He had a bone marrow bx done 2  years ago in [facility], which was negative for malignancy. During his most recent trip (which is earlier this year, returned to the [facility] 1 week ago), pt had multiple CT and MRI, which showed mass the lung with metastasis to the liver, had a lung bx which was positive for malignancy, but pt does not know which type. Pt has a CD of the imaging at bedside.     ED course:   vitals: Tmax  100.6, sinus tachy 129, BP 90-120s/40s-50s, sat well on RA   s/p vancomycin x1 and zosyn x1, 250ml of IVF x1 84y man with DM2, HTN, CVA, CAD s/p stent, PVD, BPH, current heavy smoker, newly dx lung ca w/ liver mets presents to ED with progressively worsening SOB and generalized weakness. Pt felt severely SOB this AM, and had fever and chills for the past couple days, prompting him to the ED. ROS is also positive for LE edema, nonproductive cough, gradual weight loss over the past year. Recently returned from [facility] 1 week ago.     In terms cancer hx, family endorsed that his WBC has always been high, in the 30k range. He had a bone marrow bx done 2  years ago in [facility], which was negative for malignancy. During his most recent trip (May 2019- returned to the \[facility]\"" 1 week ago), pt had multiple CT and MRI, which showed mass the lung with metastasis to the liver, had a lung bx in Aug 2019 which was positive for malignancy, but pt does not know which type. Pt has a CD of the imaging at bedside.     ED course:   vitals: Tmax  100.6, sinus tachy 129, BP 90-120s/40s-50s, sat well on RA   s/p vancomycin x1 and zosyn x1, 250ml of IVF x1

## 2019-09-23 NOTE — H&P ADULT - PROBLEM SELECTOR PLAN 10
PVD: c/w cilostazol   BPH: c/w Proscar and Flomax  DVT prophylaxis: heparin sQ   Diet: DM/DASH diet (per pt and family, able to swallow normal food)  PT consult  Nutrition consult PVD: c/w cilostazol   BPH: c/w Proscar and Flomax  current day smoker: nicotine patch   DVT prophylaxis: heparin sQ   Diet: DM/DASH diet (per pt and family, able to swallow normal food)  PT consult  Nutrition consult BPH: c/w Proscar and Flomax  current day smoker: nicotine patch   DVT prophylaxis: heparin sQ   Diet: DM/DASH diet (per pt and family, able to swallow normal food)  GOC: discussed with family and pt at bedside, currently full code, would want chemotherapy if offered   PT consult  Nutrition consult

## 2019-09-23 NOTE — PROGRESS NOTE ADULT - PROBLEM SELECTOR PLAN 3
- meets severe sepsis criteria: fever, leukocytosis, lactate of 5.2 at admission (repeated 2.9), + RLL PNA/ aspiration   - s/p vancomycin x1, and zosyn x1, s/p 250mL IVF x1  - RVP negative, f/u UA    - will continue vancomycin and zosyn   - trend lactate   - f/u BCx - meets severe sepsis criteria: fever, leukocytosis, lactate of 5.2 at admission (repeated 2.9), + RLL PNA/ aspiration   - s/p vancomycin x1, and zosyn x1, s/p 250mL IVF x1  - RVP negative, f/u UA    - will continue vancomycin and zosyn   - trend lactate, currently  - f/u BCx - meets severe sepsis criteria: fever, leukocytosis, lactate of 5.2 at admission (repeated 2.9), + RLL PNA/ aspiration   - s/p vancomycin x1, and zosyn x1, s/p 250mL IVF x1  - RVP negative, f/u UA    - will continue vancomycin and zosyn   - trend lactate, currently 1.9  - f/u BCx

## 2019-09-23 NOTE — PROGRESS NOTE ADULT - SUBJECTIVE AND OBJECTIVE BOX
SIOBHAN EPPERSON  84y  Male    Claudia Starkey MD/PhD PGY-1  [phone]  Pager [phone]  After 7pm, please call night float    Subjective:               Vital Signs Last 24 Hrs  T(C): 36.6 (23 Sep 2019 11:12), Max: 38.1 (22 Sep 2019 22:55)  T(F): 97.9 (23 Sep 2019 11:12), Max: 100.6 (22 Sep 2019 22:55)  HR: 85 (23 Sep 2019 11:12) (81 - 129)  BP: 111/51 (23 Sep 2019 11:12) (95/42 - 125/52)  BP(mean): --  RR: 18 (23 Sep 2019 11:12) (18 - 24)  SpO2: 95% (23 Sep 2019 11:12) (94% - 97%)    PHYSICAL EXAM:  GENERAL: NAD, well-groomed, well-developed  HEENT - NC/AT, pupils equal and reactive to light,  ; Moist mucous membranes, Good dentition, No lesions  NECK: Supple, No JVD  CHEST/LUNG: Clear to auscultation bilaterally; No rales, rhonchi, wheezing  HEART: Regular rate and rhythm; No murmurs, rubs, or gallops  ABDOMEN: Soft, Nontender, Nondistended; Bowel sounds present  EXTREMITIES:  2+ Peripheral Pulses, No clubbing, cyanosis, or edema  NEURO:  No Focal deficits, sensory and motor intact  SKIN: No rashes or lesions    Consultant(s) Notes Reviewed:  [x ] YES  [ ] NO  Care Discussed with Consultants/Other Providers [ x] YES  [ ] NO    LABS:          RADIOLOGY & ADDITIONAL TESTS:    Imaging Personally Reviewed:  [ ] YES  [ ] NO  MedsMEDICATIONS  (STANDING):  allopurinol 300 milliGRAM(s) Oral daily  aspirin  chewable 81 milliGRAM(s) Oral daily  dextrose 5%. 1000 milliLiter(s) (50 mL/Hr) IV Continuous <Continuous>  dextrose 50% Injectable 12.5 Gram(s) IV Push once  dextrose 50% Injectable 25 Gram(s) IV Push once  dextrose 50% Injectable 25 Gram(s) IV Push once  finasteride 5 milliGRAM(s) Oral daily  fluconAZOLE IVPB 200 milliGRAM(s) IV Intermittent once  fluconAZOLE IVPB      heparin  Injectable 5000 Unit(s) SubCutaneous every 8 hours  hydroxyurea 1000 milliGRAM(s) Oral every 12 hours  insulin lispro (HumaLOG) corrective regimen sliding scale   SubCutaneous three times a day before meals  insulin lispro (HumaLOG) corrective regimen sliding scale   SubCutaneous at bedtime  melatonin 5 milliGRAM(s) Oral at bedtime  nicotine - 21 mG/24Hr(s) Patch 1 patch Transdermal daily  piperacillin/tazobactam IVPB.. 3.375 Gram(s) IV Intermittent every 8 hours  simvastatin 10 milliGRAM(s) Oral at bedtime  tamsulosin 0.4 milliGRAM(s) Oral at bedtime  vancomycin  IVPB 500 milliGRAM(s) IV Intermittent once    MEDICATIONS  (PRN):  acetaminophen   Tablet .. 650 milliGRAM(s) Oral every 6 hours PRN Temp greater or equal to 38C (100.4F), Moderate Pain (4 - 6), Severe Pain (7 - 10)  dextrose 40% Gel 15 Gram(s) Oral once PRN Blood Glucose LESS THAN 70 milliGRAM(s)/deciliter  glucagon  Injectable 1 milliGRAM(s) IntraMuscular once PRN Glucose LESS THAN 70 milligrams/deciliter  morphine  - Injectable 2 milliGRAM(s) IV Push every 4 hours PRN Moderate Pain (4 - 6)

## 2019-09-23 NOTE — H&P ADULT - ASSESSMENT
84y man with DM2, HTN, CVA, current heavy smoker, newly dx lung ca w/ liver mets presents to ED with progressively worsening SOB and generalized weakness, could be 2/2 PNA, lung cancer progression, possible CHF exacerbation. Also found to have WBC of 129k, concern for leukemia. 84y man with DM2, HTN, CVA, current heavy smoker, CAD s/p stent, BPH, PVD, newly dx lung ca w/ liver mets presents to ED with progressively worsening SOB and generalized weakness, could be 2/2 PNA, lung cancer progression, possible CHF exacerbation. Also found to have WBC of 129k, concern for leukemia.

## 2019-09-23 NOTE — CONSULT NOTE ADULT - ASSESSMENT
84M with DM, HTN, CAD s/p stents, CVA in the past, known PVD s/p stents many years ago in [place] on cilostazol presented with fevers and shortness of breath found to have leukocytosis to 129, vascular surgery consulted for Multifocal large peripheral areas of hypodensity/ hypoenhancement in the spleen to rule out splenic infarcts.     Family denies other medical problems, heme/onc documented newly diagnosed lung CA.    Patient with no abdominal pain. Has never been told he has possible splenic infarcts before. Has been tolerating a regular diet with no nausea/ vomiting/ diarrhea. Denies history of atrial fibrillation    -hem/onc to work up hematologic issues  -would recommend dedicated CT abd with venous phase IV contrast to better assess possible splenic infarcts  -risk/ benefit of therapeutic a/c need to be weighed in the setting of malignancy, patient likely hypercoagulable at this time  -will discuss with vascular surgery fellow    Leanne Nur, PGY-4 84M with DM, HTN, CAD s/p stents, CVA in the past, known PVD s/p stents many years ago in [place] on cilostazol presented with fevers and shortness of breath found to have leukocytosis to 129, vascular surgery consulted for Multifocal large peripheral areas of hypodensity/ hypoenhancement in the spleen to rule out splenic infarcts.     Family denies other medical problems, heme/onc documented newly diagnosed lung CA.    Patient with no abdominal pain. Has never been told he has possible splenic infarcts before. Has been tolerating a regular diet with no nausea/ vomiting/ diarrhea. Denies history of atrial fibrillation    -hem/onc to work up hematologic issues  -would recommend dedicated CT abd with venous phase IV contrast to better assess possible splenic infarcts  -risk/ benefit of therapeutic a/c need to be weighed in the setting of malignancy, would recommend heparin gtt as patient likely hypercoagulable at this time  -discussed with vascular fellow, to be discussed with Dr. Amado this am     Leanne Nur, PGY-4 84M with DM, HTN, CAD s/p stents, CVA in the past, known PVD s/p stents many years ago in [place] on cilostazol presented with fevers and shortness of breath found to have leukocytosis to 129, vascular surgery consulted for Multifocal large peripheral areas of hypodensity/ hypoenhancement in the spleen to rule out splenic infarcts.     Family denies other medical problems, heme/onc documented newly diagnosed lung CA.    Patient with no abdominal pain. Has never been told he has possible splenic infarcts before. Has been tolerating a regular diet with no nausea/ vomiting/ diarrhea. 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

## 2019-09-23 NOTE — PROGRESS NOTE ADULT - PROBLEM SELECTOR PLAN 6
now resolved however initial rhythm difficult to discern  monitor on tele  trop neg, elevated BNP, sepsis, and acute leukemia

## 2019-09-23 NOTE — H&P ADULT - PROBLEM SELECTOR PLAN 2
- WBC of 129k with 20% blast -> highly suggestive of leukemia   - per pt, had a prior bone marrow bx [place] of [place] in [place], negative for malignancy, will likely need a repeat bone marrow bx   - spoke with hematology: no urgent indication for plasmaphereses   - trend CBC, BMP qD,  TLS labs q12 Heme consulted  - per rec start allopurinol 300d and hydroxyurea 1g BID  - pending bone marrow later in am

## 2019-09-23 NOTE — PROGRESS NOTE ADULT - ASSESSMENT
84y man with DM2, HTN, CVA, current heavy smoker, CAD s/p stent, BPH, PVD, newly dx lung ca w/ liver mets presents to ED with progressively worsening SOB and generalized weakness, could be 2/2 PNA, lung cancer progression, possible CHF exacerbation. Also found to have WBC of 129k, concern for leukemia.

## 2019-09-23 NOTE — H&P ADULT - NSHPREVIEWOFSYSTEMS_GEN_ALL_CORE
REVIEW OF SYSTEMS:    CONSTITUTIONAL: + weakness, + fevers , +chills  EYES/ENT: No visual changes;  No vertigo or throat pain   NECK: No pain or stiffness  ENDOCRINE: no cold/hold intolerance, no polydipsia, no polyuria   HEMATOLOGY: no bruising, no bleeding, no lymphadenopathy   RESPIRATORY: + nonproductive cough, wheezing, hemoptysis; + shortness of breath  CARDIOVASCULAR: No chest pain or palpitations  GASTROINTESTINAL: No abdominal pain; nausea, vomiting;  diarrhea or constipation. No hemetemesis, melena or hematochezia.  GENITOURITARY: No dysuria, frequency or hematuria  NEUROLOGICAL: No numbness or weakness  MUSCULOSKELETAL: no back pain, no spine deformity, no joint pain or deformity, no muscle ache   SKIN:  + chronic skin change REVIEW OF SYSTEMS:    CONSTITUTIONAL: + weakness, + fevers , +chills  EYES/ENT: No visual changes;  No vertigo or throat pain   NECK: No pain or stiffness  ENDOCRINE: no cold/hold intolerance, no polydipsia, no polyuria   HEMATOLOGY: no bruising, no bleeding, no lymphadenopathy   RESPIRATORY: + nonproductive cough, NO wheezing, hemoptysis; + shortness of breath  CARDIOVASCULAR: No chest pain or palpitations  GASTROINTESTINAL: No abdominal pain; nausea, vomiting;  diarrhea or constipation. No hemetemesis, melena or hematochezia.  GENITOURITARY: No dysuria, frequency or hematuria  NEUROLOGICAL: No numbness or weakness  MUSCULOSKELETAL: no back pain, no spine deformity, no joint pain or deformity, no muscle ache   SKIN:  + chronic skin change REVIEW OF SYSTEMS:    CONSTITUTIONAL: + weakness, + fevers , +chills  EYES/ENT: No visual changes;  No vertigo or throat pain   NECK: No pain or stiffness  ENDOCRINE: no cold/hold intolerance, no polydipsia, no polyuria   HEMATOLOGY: no bruising, no bleeding, no lymphadenopathy   RESPIRATORY: + nonproductive cough. + shortness of breath, NO wheezing, hemoptysis;   CARDIOVASCULAR: No chest pain or palpitations  GASTROINTESTINAL: No abdominal pain; nausea, vomiting;  diarrhea or constipation. No hemetemesis, melena or hematochezia.  GENITOURITARY: No dysuria, frequency or hematuria  NEUROLOGICAL: No numbness or weakness  MUSCULOSKELETAL: no back pain, no spine deformity, no joint pain or deformity, no muscle ache   SKIN:  + chronic skin change

## 2019-09-23 NOTE — H&P ADULT - PROBLEM SELECTOR PLAN 9
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

## 2019-09-23 NOTE — PROGRESS NOTE ADULT - PROBLEM SELECTOR PLAN 10
BPH: c/w Proscar and Flomax  current day smoker: nicotine patch   DVT prophylaxis: heparin sQ   Diet: DM/DASH diet (per pt and family, able to swallow normal food)  GOC: discussed with family and pt at bedside, currently full code, would want chemotherapy if offered   PT consult  Nutrition consult BPH: c/w Proscar and Flomax  current day smoker: nicotine patch   DVT prophylaxis: heparin sQ   Diet: Regular  Full code  Diet: DM/DASH diet (per pt and family, able to swallow normal food)  GOC: discussed with family and pt at bedside, currently full code, would want chemotherapy if offered   PT consult  Nutrition consult

## 2019-09-23 NOTE — CONSULT NOTE ADULT - SUBJECTIVE AND OBJECTIVE BOX
Mandarin speaking 84 M with hx of DM2, HTN, CVA newly dx lung cancer (in [place], not on tx), with liver mets p/w SOB, weakness, lethargy. Pt recently returned from [place].      Meds: metformin, lipizide, zolpidem, januvia, bisoprolol, amlodipine, statin        Allergies  No Known Allergies        PAST MEDICAL & SURGICAL HISTORY:  Lung cancer  High cholesterol  HTN (hypertension)  Diabetes mellitus  No significant past surgical history      FAMILY HISTORY:  No pertinent family history in first degree relatives      Social History:  current smoker    REVIEW OF SYSTEMS:    CONSTITUTIONAL: + fatigue/weakness, No fevers or chills  EYES/ENT: No visual changes, no throat pain   RESPIRATORY: + cough, +shortness of breath  CARDIOVASCULAR: No chest pain or palpitations  GASTROINTESTINAL: No abdominal pain, nausea, vomiting, or hematemesis; No diarrhea or constipation. No melena or hematochezia.  GENITOURINARY: No dysuria, frequency or hematuria  MUSCULOSKELETAL: No joint pain.  NEUROLOGICAL: No dizziness, numbness, or weakness  SKIN: No itching, burning, rashes, or lesions   All other review of systems is negative unless indicated above.    VITAL SIGNS:  Vital Signs Last 24 Hrs  T(C): 36.8 (23 Sep 2019 01:43), Max: 38.1 (22 Sep 2019 22:55)  T(F): 98.3 (23 Sep 2019 01:43), Max: 100.6 (22 Sep 2019 22:55)  HR: 81 (23 Sep 2019 01:43) (81 - 129)  BP: 95/42 (23 Sep 2019 01:43) (95/42 - 125/52)  BP(mean): --  RR: 20 (23 Sep 2019 01:43) (20 - 24)  SpO2: 94% (23 Sep 2019 01:43) (94% - 97%)      PHYSICAL EXAM:   GENERAL: no acute distress  HEENT: EOMI, neck supple  RESPIRATORY: LCTAB/L, no rhonchi, rales, or wheezing  CARDIOVASCULAR: RRR, no murmurs, gallops, rubs  ABDOMINAL: soft, non-tender, non-distended, positive bowel sounds   EXTREMITIES: no clubbing, cyanosis, or edema  NEUROLOGICAL: alert and oriented x 3, non-focal  SKIN: no rashes or lesions   MUSCULOSKELETAL: no gross joint deformity                          7.9    129.0 )-----------( 107      ( 22 Sep 2019 23:06 )             25.6     09-22    135  |  98  |  25<H>  ----------------------------<  284<H>  4.0   |  20<L>  |  1.54<H>    Ca    8.9      22 Sep 2019 23:06    TPro  6.1  /  Alb  3.5  /  TBili  0.6  /  DBili  x   /  AST  34  /  ALT  17  /  AlkPhos  132<H>  09-22      MEDICATIONS  (STANDING):  dextrose 5%. 1000 milliLiter(s) (50 mL/Hr) IV Continuous <Continuous>  dextrose 50% Injectable 12.5 Gram(s) IV Push once  dextrose 50% Injectable 25 Gram(s) IV Push once  dextrose 50% Injectable 25 Gram(s) IV Push once  insulin lispro (HumaLOG) corrective regimen sliding scale   SubCutaneous three times a day before meals  insulin lispro (HumaLOG) corrective regimen sliding scale   SubCutaneous at bedtime          < from: CT Angio Chest w/ IV Cont (09.23.19 @ 00:09) >  IMPRESSION:   1. 4.7 cm mass in the posterior left upper lobe should be considered   primary   lung cancer until proven otherwise. 1.3 cm right upper lobe pulmonary   nodule is   most likely a metastasis or synchronous primary.   2. Mild right lower lobe pneumonia/aspiration.   3. Multifocal hepatic metastases.   4. Substantial splenomegaly. Multifocal large peripheral areas of   hypodensity/hypoenhancement in the spleen and he related to phase of   contrast   administration but might signify multifocal large splenic infarctions.   Consider   venous phase IV contrast enhanced CT.   5. Celiac axis and portacaval lymphadenopathy.    < end of copied text > Mandarin speaking 84 M with hx of DM2, HTN, CAD s/p stents, newly dx lung cancer (in [place], not on tx), with liver mets p/w SOB, weakness. Pt reports he had bx of the lung on late Aug in [place]. Has not started treatment yet. He recently returned from [facility]. He complains of worsening dry cough, SOB with ambulation. Endorses 10Kg weight loss over the past few months.     Labs in the ED showed WBC of 129  with 20% blasts, 11% metameylocytes, 7% meylocytes.  Pt reports that he had leukocytosis (40k) in the past, had BM bx in [place] about 2 years ago. He was told that no treatment was required at that time. Wife will bring records in the a.m.      Meds: metformin, lipizide, zolpidem, januvia, bisoprolol, amlodipine, statin      Allergies  No Known Allergies      PAST MEDICAL & SURGICAL HISTORY:  Lung cancer  High cholesterol  HTN (hypertension)  Diabetes mellitus  No significant past surgical history      FAMILY HISTORY:  No pertinent family history in first degree relatives      Social History:  pt started smoking at 40 yrs of age, 1/2 pack per day, only smokes when he is in [facility]. Current smoker. Lives with wife      REVIEW OF SYSTEMS:  CONSTITUTIONAL: + fatigue/weakness, No fevers or chills  EYES/ENT: No visual changes, no throat pain   RESPIRATORY: + cough, +shortness of breath  CARDIOVASCULAR: No chest pain or palpitations  GASTROINTESTINAL: No abdominal pain, nausea, vomiting, or hematemesis; No diarrhea or constipation. No melena or hematochezia.  GENITOURINARY: No dysuria, frequency or hematuria  MUSCULOSKELETAL: No joint pain.  NEUROLOGICAL: No dizziness, numbness, or weakness  SKIN: + itching, rash in the back and abd  All other review of systems is negative unless indicated above.    VITAL SIGNS:  Vital Signs Last 24 Hrs  T(C): 36.8 (23 Sep 2019 01:43), Max: 38.1 (22 Sep 2019 22:55)  T(F): 98.3 (23 Sep 2019 01:43), Max: 100.6 (22 Sep 2019 22:55)  HR: 81 (23 Sep 2019 01:43) (81 - 129)  BP: 95/42 (23 Sep 2019 01:43) (95/42 - 125/52)  BP(mean): --  RR: 20 (23 Sep 2019 01:43) (20 - 24)  SpO2: 94% (23 Sep 2019 01:43) (94% - 97%)      PHYSICAL EXAM:   GENERAL: no acute distress  HEENT: EOMI, neck supple  RESPIRATORY: LCTAB/L, no rhonchi, rales, or wheezing  CARDIOVASCULAR: RRR, no murmurs, gallops, rubs  ABDOMINAL: soft, non-tender, non-distended, positive bowel sounds   EXTREMITIES: no clubbing, cyanosis, or edema  NEUROLOGICAL: alert and oriented x 3, non-focal  SKIN: no rashes or lesions   MUSCULOSKELETAL: no gross joint deformity                          7.9    129.0 )-----------( 107      ( 22 Sep 2019 23:06 )             25.6     09-22    135  |  98  |  25<H>  ----------------------------<  284<H>  4.0   |  20<L>  |  1.54<H>    Ca    8.9      22 Sep 2019 23:06    TPro  6.1  /  Alb  3.5  /  TBili  0.6  /  DBili  x   /  AST  34  /  ALT  17  /  AlkPhos  132<H>  09-22      MEDICATIONS  (STANDING):  dextrose 5%. 1000 milliLiter(s) (50 mL/Hr) IV Continuous <Continuous>  dextrose 50% Injectable 12.5 Gram(s) IV Push once  dextrose 50% Injectable 25 Gram(s) IV Push once  dextrose 50% Injectable 25 Gram(s) IV Push once  insulin lispro (HumaLOG) corrective regimen sliding scale   SubCutaneous three times a day before meals  insulin lispro (HumaLOG) corrective regimen sliding scale   SubCutaneous at bedtime          < from: CT Angio Chest w/ IV Cont (09.23.19 @ 00:09) >  IMPRESSION:   1. 4.7 cm mass in the posterior left upper lobe should be considered   primary   lung cancer until proven otherwise. 1.3 cm right upper lobe pulmonary   nodule is   most likely a metastasis or synchronous primary.   2. Mild right lower lobe pneumonia/aspiration.   3. Multifocal hepatic metastases.   4. Substantial splenomegaly. Multifocal large peripheral areas of   hypodensity/hypoenhancement in the spleen and he related to phase of   contrast   administration but might signify multifocal large splenic infarctions.   Consider   venous phase IV contrast enhanced CT.   5. Celiac axis and portacaval lymphadenopathy.    < end of copied text >

## 2019-09-23 NOTE — H&P ADULT - PROBLEM SELECTOR PLAN 8
- on oral meds at home only    - check A1c, ISS, DM diet - recent dx of lung mass in [facility], pt does not have bx results with him, will likely need another bx   - oncology in AM

## 2019-09-23 NOTE — H&P ADULT - NSHPSOCIALHISTORY_GEN_ALL_CORE
lives at home with family lives at home with family, was a business owner   current heavy smoker, smokes about 1 pack a day for >60 years   social EtOH drinker, denies illicit drug use

## 2019-09-23 NOTE — PROGRESS NOTE ADULT - ATTENDING COMMENTS
Seen, examined the patient in ED with house staff, Son and wife at bedside  - resting in bed, c/o SOB, weakness and both lateral abdominal pain, afebrile, no N?V or guarding    reviewed labs, imaging  -  down to 120 with 20% blast, likely consistent with Acute Leukemia    appreciated Heme consult, plans for BM biopsy today    MUGA scan ordered  - Multiple Lung nodules likely from Ca Lung/mets, low suspicion for Pneumonia    will c/w IV zosyn, vanco, fluconazole for now. ID consult called  - Vascular sx consult appreciated, CT abd/pelvis w IV contrast for r/o thrombosis given splenic infarct    will start IV heparin gtt or Lovenox after the CT  - analgesics prn, O2, bronchodilators prn  - Might need to go to Heme service  - spoke to wife and son at bedside, not a DNR or DNI

## 2019-09-24 ENCOUNTER — TRANSCRIPTION ENCOUNTER (OUTPATIENT)
Age: 84
End: 2019-09-24

## 2019-09-24 DIAGNOSIS — L28.2 OTHER PRURIGO: ICD-10-CM

## 2019-09-24 DIAGNOSIS — B99.9 UNSPECIFIED INFECTIOUS DISEASE: ICD-10-CM

## 2019-09-24 DIAGNOSIS — Z29.9 ENCOUNTER FOR PROPHYLACTIC MEASURES, UNSPECIFIED: ICD-10-CM

## 2019-09-24 DIAGNOSIS — I25.10 ATHEROSCLEROTIC HEART DISEASE OF NATIVE CORONARY ARTERY WITHOUT ANGINA PECTORIS: ICD-10-CM

## 2019-09-24 DIAGNOSIS — Z71.89 OTHER SPECIFIED COUNSELING: ICD-10-CM

## 2019-09-24 DIAGNOSIS — E78.00 PURE HYPERCHOLESTEROLEMIA, UNSPECIFIED: ICD-10-CM

## 2019-09-24 LAB
ALBUMIN SERPL ELPH-MCNC: 3.1 G/DL — LOW (ref 3.3–5)
ALP SERPL-CCNC: 130 U/L — HIGH (ref 40–120)
ALT FLD-CCNC: 13 U/L — SIGNIFICANT CHANGE UP (ref 10–45)
ANION GAP SERPL CALC-SCNC: 16 MMOL/L — SIGNIFICANT CHANGE UP (ref 5–17)
ANION GAP SERPL CALC-SCNC: 17 MMOL/L — SIGNIFICANT CHANGE UP (ref 5–17)
APTT BLD: 42.9 SEC — HIGH (ref 27.5–36.3)
AST SERPL-CCNC: 39 U/L — SIGNIFICANT CHANGE UP (ref 10–40)
BASOPHILS # BLD AUTO: 0.7 K/UL — HIGH (ref 0–0.2)
BASOPHILS # BLD AUTO: 1.11 K/UL — HIGH (ref 0–0.2)
BASOPHILS # BLD AUTO: 2.36 K/UL — HIGH (ref 0–0.2)
BASOPHILS # BLD AUTO: SIGNIFICANT CHANGE UP (ref 0–0.2)
BASOPHILS NFR BLD AUTO: 0.6 % — SIGNIFICANT CHANGE UP (ref 0–2)
BASOPHILS NFR BLD AUTO: 1 % — SIGNIFICANT CHANGE UP (ref 0–2)
BASOPHILS NFR BLD AUTO: 2 % — SIGNIFICANT CHANGE UP (ref 0–2)
BILIRUB SERPL-MCNC: 0.8 MG/DL — SIGNIFICANT CHANGE UP (ref 0.2–1.2)
BUN SERPL-MCNC: 22 MG/DL — SIGNIFICANT CHANGE UP (ref 7–23)
BUN SERPL-MCNC: 26 MG/DL — HIGH (ref 7–23)
CALCIUM SERPL-MCNC: 8.7 MG/DL — SIGNIFICANT CHANGE UP (ref 8.4–10.5)
CALCIUM SERPL-MCNC: 8.8 MG/DL — SIGNIFICANT CHANGE UP (ref 8.4–10.5)
CHLORIDE SERPL-SCNC: 97 MMOL/L — SIGNIFICANT CHANGE UP (ref 96–108)
CHLORIDE SERPL-SCNC: 98 MMOL/L — SIGNIFICANT CHANGE UP (ref 96–108)
CO2 SERPL-SCNC: 20 MMOL/L — LOW (ref 22–31)
CO2 SERPL-SCNC: 20 MMOL/L — LOW (ref 22–31)
CREAT SERPL-MCNC: 1.57 MG/DL — HIGH (ref 0.5–1.3)
CREAT SERPL-MCNC: 1.68 MG/DL — HIGH (ref 0.5–1.3)
CULTURE RESULTS: NO GROWTH — SIGNIFICANT CHANGE UP
D DIMER BLD IA.RAPID-MCNC: 7774 NG/ML DDU — HIGH
D DIMER BLD IA.RAPID-MCNC: 8592 NG/ML DDU — HIGH
EOSINOPHIL # BLD AUTO: 0 K/UL — SIGNIFICANT CHANGE UP (ref 0–0.5)
EOSINOPHIL # BLD AUTO: 0.98 K/UL — HIGH (ref 0–0.5)
EOSINOPHIL # BLD AUTO: 2.23 K/UL — HIGH (ref 0–0.5)
EOSINOPHIL # BLD AUTO: SIGNIFICANT CHANGE UP (ref 0–0.5)
EOSINOPHIL NFR BLD AUTO: 0 % — SIGNIFICANT CHANGE UP (ref 0–6)
EOSINOPHIL NFR BLD AUTO: 0.9 % — SIGNIFICANT CHANGE UP (ref 0–6)
EOSINOPHIL NFR BLD AUTO: 1 % — SIGNIFICANT CHANGE UP (ref 0–6)
EOSINOPHIL NFR BLD AUTO: 2 % — SIGNIFICANT CHANGE UP (ref 0–6)
FIBRINOGEN PPP-MCNC: 423 MG/DL — SIGNIFICANT CHANGE UP (ref 350–510)
FIBRINOGEN PPP-MCNC: 450 MG/DL — SIGNIFICANT CHANGE UP (ref 350–510)
GLUCOSE BLDC GLUCOMTR-MCNC: 112 MG/DL — HIGH (ref 70–99)
GLUCOSE BLDC GLUCOMTR-MCNC: 125 MG/DL — HIGH (ref 70–99)
GLUCOSE BLDC GLUCOMTR-MCNC: 143 MG/DL — HIGH (ref 70–99)
GLUCOSE BLDC GLUCOMTR-MCNC: 235 MG/DL — HIGH (ref 70–99)
GLUCOSE SERPL-MCNC: 126 MG/DL — HIGH (ref 70–99)
GLUCOSE SERPL-MCNC: 93 MG/DL — SIGNIFICANT CHANGE UP (ref 70–99)
HCT VFR BLD CALC: 23.7 % — LOW (ref 39–50)
HCT VFR BLD CALC: 26.1 % — LOW (ref 39–50)
HGB BLD-MCNC: 7 G/DL — CRITICAL LOW (ref 13–17)
HGB BLD-MCNC: 7.5 G/DL — LOW (ref 13–17)
HIV 1+2 AB+HIV1 P24 AG SERPL QL IA: SIGNIFICANT CHANGE UP
IMM GRANULOCYTES NFR BLD AUTO: 19.9 % — HIGH (ref 0–1.5)
INR BLD: 1.26 RATIO — HIGH (ref 0.88–1.16)
LDH SERPL L TO P-CCNC: 2161 U/L — HIGH (ref 50–242)
LDH SERPL L TO P-CCNC: 2188 U/L — HIGH (ref 50–242)
LYMPHOCYTES # BLD AUTO: 2 % — LOW (ref 13–44)
LYMPHOCYTES # BLD AUTO: 2.23 K/UL — SIGNIFICANT CHANGE UP (ref 1–3.3)
LYMPHOCYTES # BLD AUTO: 4 % — LOW (ref 13–44)
LYMPHOCYTES # BLD AUTO: 4 % — LOW (ref 13–44)
LYMPHOCYTES # BLD AUTO: 4.58 K/UL — HIGH (ref 1–3.3)
LYMPHOCYTES # BLD AUTO: 6 % — LOW (ref 13–44)
LYMPHOCYTES # BLD AUTO: 7.08 K/UL — HIGH (ref 1–3.3)
LYMPHOCYTES # BLD AUTO: SIGNIFICANT CHANGE UP (ref 1–3.3)
MAGNESIUM SERPL-MCNC: 1.8 MG/DL — SIGNIFICANT CHANGE UP (ref 1.6–2.6)
MAGNESIUM SERPL-MCNC: 1.9 MG/DL — SIGNIFICANT CHANGE UP (ref 1.6–2.6)
MCHC RBC-ENTMCNC: 26.9 PG — LOW (ref 27–34)
MCHC RBC-ENTMCNC: 28.3 PG — SIGNIFICANT CHANGE UP (ref 27–34)
MCHC RBC-ENTMCNC: 28.6 GM/DL — LOW (ref 32–36)
MCHC RBC-ENTMCNC: 29.5 GM/DL — LOW (ref 32–36)
MCV RBC AUTO: 94.2 FL — SIGNIFICANT CHANGE UP (ref 80–100)
MCV RBC AUTO: 96 FL — SIGNIFICANT CHANGE UP (ref 80–100)
MONOCYTES # BLD AUTO: 37.31 K/UL — HIGH (ref 0–0.9)
MONOCYTES # BLD AUTO: 38.99 K/UL — HIGH (ref 0–0.9)
MONOCYTES # BLD AUTO: 5.9 K/UL — HIGH (ref 0–0.9)
MONOCYTES # BLD AUTO: SIGNIFICANT CHANGE UP (ref 0–0.9)
MONOCYTES NFR BLD AUTO: 32.4 % — HIGH (ref 2–14)
MONOCYTES NFR BLD AUTO: 35 % — HIGH (ref 2–14)
MONOCYTES NFR BLD AUTO: 35 % — HIGH (ref 2–14)
MONOCYTES NFR BLD AUTO: 5 % — SIGNIFICANT CHANGE UP (ref 2–14)
NEUTROPHILS # BLD AUTO: 47.91 K/UL — HIGH (ref 1.8–7.4)
NEUTROPHILS # BLD AUTO: 48.79 K/UL — HIGH (ref 1.8–7.4)
NEUTROPHILS # BLD AUTO: 60.22 K/UL — HIGH (ref 1.8–7.4)
NEUTROPHILS # BLD AUTO: SIGNIFICANT CHANGE UP (ref 1.8–7.4)
NEUTROPHILS NFR BLD AUTO: 25 % — LOW (ref 43–77)
NEUTROPHILS NFR BLD AUTO: 42.2 % — LOW (ref 43–77)
NEUTROPHILS NFR BLD AUTO: 43 % — SIGNIFICANT CHANGE UP (ref 43–77)
NEUTROPHILS NFR BLD AUTO: 44 % — SIGNIFICANT CHANGE UP (ref 43–77)
NRBC # BLD: 6 /100 WBCS — HIGH (ref 0–0)
PHOSPHATE SERPL-MCNC: 5.5 MG/DL — HIGH (ref 2.5–4.5)
PHOSPHATE SERPL-MCNC: 6.4 MG/DL — HIGH (ref 2.5–4.5)
PLATELET # BLD AUTO: 87 K/UL — LOW (ref 150–400)
PLATELET # BLD AUTO: 89 K/UL — LOW (ref 150–400)
POTASSIUM SERPL-MCNC: 3.9 MMOL/L — SIGNIFICANT CHANGE UP (ref 3.5–5.3)
POTASSIUM SERPL-MCNC: 4.1 MMOL/L — SIGNIFICANT CHANGE UP (ref 3.5–5.3)
POTASSIUM SERPL-SCNC: 3.9 MMOL/L — SIGNIFICANT CHANGE UP (ref 3.5–5.3)
POTASSIUM SERPL-SCNC: 4.1 MMOL/L — SIGNIFICANT CHANGE UP (ref 3.5–5.3)
PROT SERPL-MCNC: 5.9 G/DL — LOW (ref 6–8.3)
PROTHROM AB SERPL-ACNC: 14.6 SEC — HIGH (ref 10–12.9)
PT 100%: 14.6 SEC — HIGH (ref 10–12.9)
PT 50/50: 12.2 SEC — SIGNIFICANT CHANGE UP (ref 9.7–15.2)
RBC # BLD: 2.47 M/UL — LOW (ref 4.2–5.8)
RBC # BLD: 2.78 M/UL — LOW (ref 4.2–5.8)
RBC # FLD: 19 % — HIGH (ref 10.3–14.5)
RBC # FLD: 19.2 % — HIGH (ref 10.3–14.5)
SODIUM SERPL-SCNC: 134 MMOL/L — LOW (ref 135–145)
SODIUM SERPL-SCNC: 134 MMOL/L — LOW (ref 135–145)
SPECIMEN SOURCE: SIGNIFICANT CHANGE UP
THROMBIN TIME: 24.8 SEC — SIGNIFICANT CHANGE UP (ref 16–25)
TM INTERPRETATION: SIGNIFICANT CHANGE UP
URATE SERPL-MCNC: 5 MG/DL — SIGNIFICANT CHANGE UP (ref 3.4–8.8)
URATE SERPL-MCNC: 5.1 MG/DL — SIGNIFICANT CHANGE UP (ref 3.4–8.8)
WBC # BLD: 111.41 K/UL — CRITICAL HIGH (ref 3.8–10.5)
WBC # BLD: 113 K/UL — CRITICAL HIGH (ref 3.8–10.5)
WBC # FLD AUTO: 111.41 K/UL — CRITICAL HIGH (ref 3.8–10.5)
WBC # FLD AUTO: 113 K/UL — CRITICAL HIGH (ref 3.8–10.5)

## 2019-09-24 PROCEDURE — 93306 TTE W/DOPPLER COMPLETE: CPT | Mod: 26

## 2019-09-24 PROCEDURE — 99233 SBSQ HOSP IP/OBS HIGH 50: CPT | Mod: GC

## 2019-09-24 PROCEDURE — 74177 CT ABD & PELVIS W/CONTRAST: CPT | Mod: 26

## 2019-09-24 PROCEDURE — 99232 SBSQ HOSP IP/OBS MODERATE 35: CPT

## 2019-09-24 PROCEDURE — 93010 ELECTROCARDIOGRAM REPORT: CPT

## 2019-09-24 RX ORDER — DOCUSATE SODIUM 100 MG
100 CAPSULE ORAL THREE TIMES A DAY
Refills: 0 | Status: DISCONTINUED | OUTPATIENT
Start: 2019-09-24 | End: 2019-10-02

## 2019-09-24 RX ORDER — BISOPROLOL FUMARATE 10 MG/1
5 TABLET, FILM COATED ORAL DAILY
Refills: 0 | Status: DISCONTINUED | OUTPATIENT
Start: 2019-09-24 | End: 2019-10-02

## 2019-09-24 RX ORDER — SODIUM CHLORIDE 9 MG/ML
1000 INJECTION INTRAMUSCULAR; INTRAVENOUS; SUBCUTANEOUS
Refills: 0 | Status: DISCONTINUED | OUTPATIENT
Start: 2019-09-24 | End: 2019-10-02

## 2019-09-24 RX ORDER — HYDROXYUREA 500 MG/1
500 CAPSULE ORAL EVERY 8 HOURS
Refills: 0 | Status: DISCONTINUED | OUTPATIENT
Start: 2019-09-24 | End: 2019-09-25

## 2019-09-24 RX ORDER — AMLODIPINE BESYLATE 2.5 MG/1
5 TABLET ORAL DAILY
Refills: 0 | Status: DISCONTINUED | OUTPATIENT
Start: 2019-09-24 | End: 2019-10-02

## 2019-09-24 RX ORDER — CALCIUM ACETATE 667 MG
667 TABLET ORAL
Refills: 0 | Status: DISCONTINUED | OUTPATIENT
Start: 2019-09-24 | End: 2019-09-25

## 2019-09-24 RX ORDER — MEN-PHOR .5; .5 G/G; G/G
1 LOTION TOPICAL
Refills: 0 | Status: DISCONTINUED | OUTPATIENT
Start: 2019-09-24 | End: 2019-09-25

## 2019-09-24 RX ORDER — PETROLATUM,WHITE
1 JELLY (GRAM) TOPICAL
Refills: 0 | Status: DISCONTINUED | OUTPATIENT
Start: 2019-09-24 | End: 2019-10-02

## 2019-09-24 RX ORDER — TAMSULOSIN HYDROCHLORIDE 0.4 MG/1
0.4 CAPSULE ORAL AT BEDTIME
Refills: 0 | Status: DISCONTINUED | OUTPATIENT
Start: 2019-09-24 | End: 2019-09-24

## 2019-09-24 RX ORDER — SALIVA SUBSTITUTE COMB NO.11 351 MG
5 POWDER IN PACKET (EA) MUCOUS MEMBRANE
Refills: 0 | Status: DISCONTINUED | OUTPATIENT
Start: 2019-09-24 | End: 2019-10-02

## 2019-09-24 RX ADMIN — Medication 5 MILLILITER(S): at 13:18

## 2019-09-24 RX ADMIN — SIMVASTATIN 10 MILLIGRAM(S): 20 TABLET, FILM COATED ORAL at 23:00

## 2019-09-24 RX ADMIN — HYDROXYUREA 500 MILLIGRAM(S): 500 CAPSULE ORAL at 13:45

## 2019-09-24 RX ADMIN — BISOPROLOL FUMARATE 5 MILLIGRAM(S): 10 TABLET, FILM COATED ORAL at 22:59

## 2019-09-24 RX ADMIN — Medication 650 MILLIGRAM(S): at 17:38

## 2019-09-24 RX ADMIN — Medication 5 MILLILITER(S): at 23:00

## 2019-09-24 RX ADMIN — HYDROXYUREA 1000 MILLIGRAM(S): 500 CAPSULE ORAL at 05:15

## 2019-09-24 RX ADMIN — Medication 300 MILLIGRAM(S): at 11:55

## 2019-09-24 RX ADMIN — Medication 5 MILLILITER(S): at 16:44

## 2019-09-24 RX ADMIN — SODIUM CHLORIDE 75 MILLILITER(S): 9 INJECTION INTRAMUSCULAR; INTRAVENOUS; SUBCUTANEOUS at 13:44

## 2019-09-24 RX ADMIN — FINASTERIDE 5 MILLIGRAM(S): 5 TABLET, FILM COATED ORAL at 11:55

## 2019-09-24 RX ADMIN — Medication 667 MILLIGRAM(S): at 23:18

## 2019-09-24 RX ADMIN — HEPARIN SODIUM 5000 UNIT(S): 5000 INJECTION INTRAVENOUS; SUBCUTANEOUS at 05:14

## 2019-09-24 RX ADMIN — Medication 1 APPLICATION(S): at 17:38

## 2019-09-24 RX ADMIN — TAMSULOSIN HYDROCHLORIDE 0.4 MILLIGRAM(S): 0.4 CAPSULE ORAL at 23:00

## 2019-09-24 RX ADMIN — Medication 667 MILLIGRAM(S): at 16:53

## 2019-09-24 RX ADMIN — PIPERACILLIN AND TAZOBACTAM 25 GRAM(S): 4; .5 INJECTION, POWDER, LYOPHILIZED, FOR SOLUTION INTRAVENOUS at 16:44

## 2019-09-24 RX ADMIN — Medication 5 MILLILITER(S): at 20:16

## 2019-09-24 RX ADMIN — ZOLPIDEM TARTRATE 5 MILLIGRAM(S): 10 TABLET ORAL at 23:06

## 2019-09-24 RX ADMIN — MEN-PHOR 1 APPLICATION(S): .5; .5 LOTION TOPICAL at 22:00

## 2019-09-24 RX ADMIN — HYDROXYUREA 500 MILLIGRAM(S): 500 CAPSULE ORAL at 22:59

## 2019-09-24 RX ADMIN — PIPERACILLIN AND TAZOBACTAM 25 GRAM(S): 4; .5 INJECTION, POWDER, LYOPHILIZED, FOR SOLUTION INTRAVENOUS at 06:24

## 2019-09-24 NOTE — PROGRESS NOTE ADULT - PROBLEM SELECTOR PLAN 9
Heparin sc d/c'ed due to pending worsening thrombocytopenia  Encourage ambulation      Contact info [phone] Dr Valero had tensive discussion with the pt/family (wife and son at bedside) about prognosis and treatment options for secondary acute leukemia as well as metastatic lung cca. Family wants to continue diagnostic testing and want to pursue treatment for hematological disorder but unsure if they want treatment for lung cancer. Poor prognosis of untreated stage IV lung ca was discussed. Continue GOC discussion.

## 2019-09-24 NOTE — PROGRESS NOTE ADULT - PROBLEM SELECTOR PLAN 2
Lung cancer with liver mets  -recently (late Aug) diagnosed in [facility] by lung mass bx (no liver bx)  -pt has not received any treatment; after rediscussing today, per wife, may be difficult to obtain records  -will likely need to manage acute leukemia first before treating lung cancer Lung cancer with liver mets  -recently (late Aug) diagnosed in [facility] by lung mass bx (no liver bx)  -pt has not received any treatment; after rediscussing on 9/23 per wife, may be difficult to obtain records  -will likely need to manage acute leukemia first before treating lung cancer  -CT A/P with contrast to assess disease extend

## 2019-09-24 NOTE — DISCHARGE NOTE PROVIDER - CARE PROVIDERS DIRECT ADDRESSES
[email or web address]

## 2019-09-24 NOTE — PROGRESS NOTE ADULT - PROBLEM SELECTOR PLAN 5
- Fluid overload resolved on exam today  - albumin wnl   - given soft BP, will hold Lasix   - low salt diet, daily weight, trend BMP   - TTE ordered

## 2019-09-24 NOTE — DIETITIAN INITIAL EVALUATION ADULT. - PROBLEM SELECTOR PLAN 3
- meets severe sepsis criteria: fever, leukocytosis, lactate of 5.2 at admission (repeated 2.9), + RLL PNA/ aspiration   - s/p vancomycin x1, and zosyn x1, s/p 250mL IVF x1  - RVP negative, f/u UA    - will continue vancomycin and zosyn   - trend lactate   - f/u BCx

## 2019-09-24 NOTE — PROGRESS NOTE ADULT - ASSESSMENT
Mandarin speaking 84 M with hx of DM2, HTN, CAD s/p stents, newly dx lung cancer (in [place], not on tx), with liver mets p/w SOB, weakness found to have leukocytosis with wbc 129 and 20% blasts.  Pt has long standing history of abnormal CBC (since 2012), now presented with blast crisis. Smear s/o CML with blast phase but flow is pending to confirm. He underwent a BMBX on 9/23. He also noted to have Large EMILE lung mass, smaller Right lung nodule and findings concerning for liver mets. Pt has leukocytosis, anemia and thrombocytopenia due to magligancy Mandarin speaking 84 M with hx of DM2, HTN, PVD, CAD s/p 3 stents Sept 2019, AAA/4 stents, current smoker,  newly dx lung cancer (s/p needle bx in [place], not on tx ), with liver mets p/w SOB, weakness found to have leukocytosis with wbc 129 and 20% blasts. Pt has long standing history of abnormal CBC (since 2012),    bx 2 yrs w/o malignancy per family now presented with blast crisis. Smear s/o CML with blast phase but flow is pending to confirm. He underwent a BMBX on 9/23. He also noted to have Large EMILE lung mass, smaller Right lung nodule and findings concerning for liver mets. Pt has leukocytosis, anemia and thrombocytopenia due to malignancy

## 2019-09-24 NOTE — DISCHARGE NOTE PROVIDER - CARE PROVIDER_API CALL
Kathleen Cabral)  Hematology; Internal Medicine; Medical Oncology  [facility] MedicineHematology Oncology, [address] [place] [address]  Phone: [phone]  Fax: [phone]  Follow Up Time: Kathleen Cabral)  Hematology; Internal Medicine; Medical Oncology  [facility] MedicineHematology Oncology, [address] [place] [address]  Phone: [phone]  Fax: [phone]  Follow Up Time:     Allie Alfaro)  DermatologyDermatopathology  [address] [place] [address]  Phone: [phone]  Fax: [phone]  Follow Up Time:

## 2019-09-24 NOTE — PROGRESS NOTE ADULT - PROBLEM SELECTOR PLAN 2
Heme consulted  - per rec start allopurinol 300d and hydroxyurea 1g BID  - BM biopsy done yesterday; results pending  - Flow cytometry Leukocytosis with myeloid left shift and decreased granularity, few blasts and nRBCs

## 2019-09-24 NOTE — PROGRESS NOTE ADULT - PROBLEM SELECTOR PLAN 1
- SOB multifactorial, 2/2 lung cancer, PNA, HF, and acute leukemia  - improved edema in LEs today; lungs CTAB  - given borderline hypotension, will hold lasix  - TTE ordered   - EKG showed NSR, no ST elevation/depression, T wave inversion, negative troponin   - CT angio negative for PE, but showed 4.7cm mass in EMILE, and 1.3cm mass in RUL, Mild right lower lobe pneumonia/aspiration  - currently does not require supplement O2, can give supplement O2 PRN   - c/w vanco and zosyn for PNA, vanco bilevel

## 2019-09-24 NOTE — PROGRESS NOTE ADULT - PROBLEM SELECTOR PLAN 8
Dr Valero had tensive discussion with the pt/family (wife and son at bedside) about prognosis and treatment options for secondary acute leukemia as well as metastatic lung cca. Family wants to continue diagnostic testing and want to pursue treatment for hematological disorder but unsure if they want treatment for lung cancer. Poor prognosis of untreated stage IV lung ca was discussed. Continue GOC discussion. Derm consulted  for chronic rashes upper back and feet  no primary lesions appreciated on exam, all appear secondary to scratching including excoriations, prurigo nodules, hyperpigmentation, and macular amyloid (brown colored deposition in the skin due to chronic rubbing/scratching). Pruritus can be secondary to underlying malignancy as well as underlying kidney disease (CKD Stage 3) vs obstructive process in the liver due to lung cancer mets (though normal Bilirubin, pending imaging)  -skin care with Vaseline BID   -Triamcinolone ointment 0.1% BID 454g tub, patient will need help to apply to the back   -Sarna cream PRN itching

## 2019-09-24 NOTE — DIETITIAN INITIAL EVALUATION ADULT. - PROBLEM SELECTOR PLAN 9
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

## 2019-09-24 NOTE — PROGRESS NOTE ADULT - PROBLEM SELECTOR PLAN 10
BPH: c/w Proscar and Flomax  current day smoker: nicotine patch   DVT prophylaxis: heparin sQ   Diet: Regular  Full code  Diet: DM/DASH diet (per pt and family, able to swallow normal food)  GOC: discussed with family and pt at bedside, currently full code, would want chemotherapy if offered   PT consult  Nutrition consult

## 2019-09-24 NOTE — CHART NOTE - NSCHARTNOTEFT_GEN_A_CORE
Dermatology Brief Note    HPI:  84y man with DM2, HTN, CVA, CAD s/p stent, PVD, BPH, current heavy smoker, newly dx lung ca w/ liver mets presents to ED with progressively worsening SOB and generalized weakness.     Dermatology is consulted for evaluation of pruritus and rash on the back and legs. Patient has had itching and rash x 1 year or greater. Has been taking antihistamines which have not been helping. He has been using lotion to apply on the affected areas which helps somewhat with the itching. Denies pain in the affected areas. Notes itching of the scalp as well. Denies any bleeding or drainage from the lesions. No modifying factors        PHYSICAL EXAM:  Vital Signs Last 24 Hrs  T(C): 37.7 (24 Sep 2019 13:34), Max: 37.9 (23 Sep 2019 17:41)  T(F): 99.9 (24 Sep 2019 13:34), Max: 100.3 (23 Sep 2019 17:41)  HR: 85 (24 Sep 2019 13:34) (85 - 99)  BP: 107/55 (24 Sep 2019 13:34) (107/55 - 142/63)  BP(mean): --  RR: 18 (24 Sep 2019 13:34) (16 - 18)  SpO2: 98% (24 Sep 2019 13:34) (94% - 99%)    Skin exam notable for:  The patient was alert and oriented X 3, well nourished, and in no apparent distress. Oropharynx showed no ulcerations. There was no visible lymphadenopathy. Conjunctiva were non-injected. There was no clubbing or edema of extremities.    The scalp, hair, face, eyebrows, lips, oropharynx , neck, chest, back, buttocks, extremities X 4, hands, feet, nails were examined. There was no hyperhidrosis or bromhidrosis.     The following lesions are noted:     the back with multiple excoriated papules and nodules, hyperpigmented patches on the lower back, the bilateral lower legs with hyperpigmented macules     ASSESSMENT/PLAN:    #Pruritus- no primary lesions appreciated on exam, all appear secondary to scratching including excoriations, prurigo nodules, hyperpigmentation, and macular amyloid (brown colored deposition in the skin due to chronic rubbing/scratching). Pruritus can be secondary to underlying malignancy as well as underlying kidney disease (CKD Stage 3) vs obstructive process in the liver due to lung cancer mets (though normal Bilirubin, pending imaging)  -Recommend skin care with Vaseline BID   -Triamcinolone ointment 0.1% BID 454g tub, patient will need help to apply to the back   -Sarna cream PRN itching     Discussed with primary team.  Discussed with attending, Dr. Alfaro.     Jayla Mojica MD  PGY-3, Dermatology

## 2019-09-24 NOTE — DIETITIAN INITIAL EVALUATION ADULT. - OTHER INFO
Diet PTA: 3 meals usually consisting of traditional Chinese foods, would eat rice/bread/noodles at times, was consuming a variety of fruits, vegetables, proteins. Spouse reports pt would eat well as long as it was food he liked but there were times when he was not eating as well. Reports pt occasionally checks his Finger sticks at home after meals and it would be around 160-180.     Pt reports NKFA. No micronutrient coverage at home.     Spouse reports pt weighed about 154 pounds about 2-3 years ago and has been gradually losing wt. Reports he weighed about 175 pounds a decade ago.     Pt and spouse agreeable to taking Glucerna shakes in house. Discussed importance of avoiding excess salt at this time as spouse stated pt was asking for some salt for his trays.

## 2019-09-24 NOTE — PROGRESS NOTE ADULT - PROBLEM SELECTOR PLAN 6
FS with sliding scale   Check A1C FS with sliding scale   Check A1C  PVD, on Cilostazol 50 mg bid, resume if ok with IR

## 2019-09-24 NOTE — DISCHARGE NOTE PROVIDER - NSDCCPCAREPLAN_GEN_ALL_CORE_FT
PRINCIPAL DISCHARGE DIAGNOSIS  Diagnosis: Chronic myelomonocytic leukemia  Assessment and Plan of Treatment: Maintain counts and remian free from infection. Notify MD and report to the ER for any Temp greater than or equal to 100.4, intractable nausea, vomiting, diarrhea or uncontrollable bleeding      SECONDARY DISCHARGE DIAGNOSES  Diagnosis: Lung cancer  Assessment and Plan of Treatment:     Diagnosis: Sepsis  Assessment and Plan of Treatment:

## 2019-09-24 NOTE — DIETITIAN INITIAL EVALUATION ADULT. - PHYSICAL APPEARANCE
other (specify)/debilitated Pt agreeable to nutrition focused physical exam, findings as follows: moderate muscle loss around temples and clavicles, severe muscle loss around shoulders and calves; severe fat loss around orbital and buccal region and mild to moderate fat loss around triceps   Skin: intact  Edema: none at this time

## 2019-09-24 NOTE — PROGRESS NOTE ADULT - PROBLEM SELECTOR PLAN 8
- recent dx of lung mass in [place], pt does not have bx results with him, will likely need another bx   - oncology in AM; appreciates recs

## 2019-09-24 NOTE — CHART NOTE - NSCHARTNOTEFT_GEN_A_CORE
Upon Nutritional Assessment by the Registered Dietitian your patient was determined to meet criteria / has evidence of the following diagnosis/diagnoses:          [ ]  Mild Protein Calorie Malnutrition        [ ]  Moderate Protein Calorie Malnutrition        [x] Severe Protein Calorie Malnutrition        [ ] Unspecified Protein Calorie Malnutrition        [ ] Underweight / BMI <19        [ ] Morbid Obesity / BMI > 40      Findings as based on:  [x] Comprehensive nutrition assessment   [x] Nutrition Focused Physical Exam  [ ] Other:       Nutrition Plan/Recommendations:      Recommend Glucerna shake x2 daily. Encouraged PO intake-small, frequent meals and nutrient dense snacks. In house, encouraged pt to order nutrient dense snacks c meals to consume in between meals to mimic small, frequent meals. Discussed protein rich foods available on menu. Discussed consuming protein first at meal times and then eating the other foods.     PROVIDER Section:     By signing this assessment you are acknowledging and agree with the diagnosis/diagnoses assigned by the Registered Dietitian    Comments:

## 2019-09-24 NOTE — DISCHARGE NOTE PROVIDER - PROVIDER TOKENS
PROVIDER:[TOKEN:[[id number]]] PROVIDER:[TOKEN:[[id number]]],PROVIDER:[TOKEN:[[id number]]]

## 2019-09-24 NOTE — PROGRESS NOTE ADULT - SUBJECTIVE AND OBJECTIVE BOX
SIOBHAN EPPERSON  84y  Male    Claudia Starkey MD/PhD PGY-1  [phone]  Pager [phone]  After 7pm, please call night float    Subjective:   No acute events ON. Patient with improved abdominal pain. Still reports SOB, but improved. Feels "bloated." Denies n/v, chest pain.         Vital Signs Last 24 Hrs  T(C): 37.9 (24 Sep 2019 06:15), Max: 37.9 (23 Sep 2019 17:41)  T(F): 100.3 (24 Sep 2019 06:15), Max: 100.3 (23 Sep 2019 17:41)  HR: 87 (24 Sep 2019 06:15) (85 - 99)  BP: 120/50 (24 Sep 2019 06:15) (111/51 - 142/63)  BP(mean): --  RR: 18 (24 Sep 2019 06:15) (16 - 18)  SpO2: 94% (24 Sep 2019 06:15) (94% - 99%)    PHYSICAL EXAM:  GENERAL: NAD, thin male  HEENT - NC/AT, pupils equal and reactive to light  NECK: Supple, No JVD  CHEST/LUNG: Clear to auscultation bilaterally; No rales, rhonchi, wheezing  HEART: Regular rate and rhythm; No murmurs, rubs, or gallops  ABDOMEN: Soft, Nontender, distended, tympanic gas sounds; Bowel sounds present  EXTREMITIES:  2+ Peripheral Pulses, No clubbing, cyanosis, or edema  NEURO:  No Focal deficits, sensory and motor intact  SKIN: Several small bruises on extremities    Consultant(s) Notes Reviewed:  [x ] YES  [ ] NO  Care Discussed with Consultants/Other Providers [ x] YES  [ ] NO    LABS:  09-24    134<L>  |  98  |  22  ----------------------------<  93  4.1   |  20<L>  |  1.57<H>    Ca    8.8      24 Sep 2019 07:11  Phos  5.5     09-24  Mg     1.8     09-24    TPro  5.7<L>  /  Alb  3.4  /  TBili  0.7  /  DBili  x   /  AST  27  /  ALT  13  /  AlkPhos  111  09-23    Lactate Dehydrogenase, Serum (09.24.19 @ 07:11)    Lactate Dehydrogenase, Serum: 2188: TEST REPEATED U/L    Uric Acid, Serum (09.24.19 @ 07:11)    Uric Acid, Serum: 5.0 mg/dL          MEDICATIONS  (STANDING):  allopurinol 300 milliGRAM(s) Oral daily  aspirin  chewable 81 milliGRAM(s) Oral daily  dextrose 5%. 1000 milliLiter(s) (50 mL/Hr) IV Continuous <Continuous>  dextrose 50% Injectable 12.5 Gram(s) IV Push once  dextrose 50% Injectable 25 Gram(s) IV Push once  dextrose 50% Injectable 25 Gram(s) IV Push once  finasteride 5 milliGRAM(s) Oral daily  fluconAZOLE IVPB      fluconAZOLE IVPB 200 milliGRAM(s) IV Intermittent every 24 hours  heparin  Injectable 5000 Unit(s) SubCutaneous every 8 hours  hydroxyurea 1000 milliGRAM(s) Oral every 12 hours  influenza   Vaccine 0.5 milliLiter(s) IntraMuscular once  insulin lispro (HumaLOG) corrective regimen sliding scale   SubCutaneous three times a day before meals  insulin lispro (HumaLOG) corrective regimen sliding scale   SubCutaneous at bedtime  lidocaine 2% Injectable 1 Vial(s) Local Injection once  melatonin 5 milliGRAM(s) Oral at bedtime  nicotine - 21 mG/24Hr(s) Patch 1 patch Transdermal daily  piperacillin/tazobactam IVPB.. 3.375 Gram(s) IV Intermittent every 8 hours  simvastatin 10 milliGRAM(s) Oral at bedtime  tamsulosin 0.4 milliGRAM(s) Oral at bedtime    MEDICATIONS  (PRN):  acetaminophen   Tablet .. 650 milliGRAM(s) Oral every 6 hours PRN Temp greater or equal to 38C (100.4F), Moderate Pain (4 - 6), Severe Pain (7 - 10)  dextrose 40% Gel 15 Gram(s) Oral once PRN Blood Glucose LESS THAN 70 milliGRAM(s)/deciliter  glucagon  Injectable 1 milliGRAM(s) IntraMuscular once PRN Glucose LESS THAN 70 milligrams/deciliter  morphine  - Injectable 2 milliGRAM(s) IV Push every 4 hours PRN Moderate Pain (4 - 6)  zolpidem 5 milliGRAM(s) Oral at bedtime PRN Insomnia

## 2019-09-24 NOTE — PROGRESS NOTE ADULT - SUBJECTIVE AND OBJECTIVE BOX
Diagnosis: r/o Acute leukemia    Protocol/Chemo Regimen: pending   Day: NA     Pt endorsed:    Review of Systems: Patient denied nausea, vomiting, odynophagia, chest pain, cough, dyspnea, abdominal pain, constipation, diarrhea, rash, fatigue, headache    Pain scale:                                        Location:    Diet:     Allergies: No Known Allergies      ANTIMICROBIALS  piperacillin/tazobactam IVPB.. 3.375 Gram(s) IV Intermittent every 8 hours      HEME/ONC MEDICATIONS  hydroxyurea 500 milliGRAM(s) Oral every 8 hours      STANDING MEDICATIONS  allopurinol 300 milliGRAM(s) Oral daily  dextrose 5%. 1000 milliLiter(s) IV Continuous <Continuous>  dextrose 50% Injectable 12.5 Gram(s) IV Push once  dextrose 50% Injectable 25 Gram(s) IV Push once  dextrose 50% Injectable 25 Gram(s) IV Push once  finasteride 5 milliGRAM(s) Oral daily  influenza   Vaccine 0.5 milliLiter(s) IntraMuscular once  insulin lispro (HumaLOG) corrective regimen sliding scale   SubCutaneous three times a day before meals  insulin lispro (HumaLOG) corrective regimen sliding scale   SubCutaneous at bedtime  lidocaine 2% Injectable 1 Vial(s) Local Injection once  melatonin 5 milliGRAM(s) Oral at bedtime  nicotine - 21 mG/24Hr(s) Patch 1 patch Transdermal daily  simvastatin 10 milliGRAM(s) Oral at bedtime  sodium chloride 0.9%. 1000 milliLiter(s) IV Continuous <Continuous>  tamsulosin 0.4 milliGRAM(s) Oral at bedtime      PRN MEDICATIONS  acetaminophen   Tablet .. 650 milliGRAM(s) Oral every 6 hours PRN  dextrose 40% Gel 15 Gram(s) Oral once PRN  glucagon  Injectable 1 milliGRAM(s) IntraMuscular once PRN  morphine  - Injectable 2 milliGRAM(s) IV Push every 4 hours PRN  zolpidem 5 milliGRAM(s) Oral at bedtime PRN      Vital Signs Last 24 Hrs  T(C): 37.9 (24 Sep 2019 06:15), Max: 37.9 (23 Sep 2019 17:41)  T(F): 100.3 (24 Sep 2019 06:15), Max: 100.3 (23 Sep 2019 17:41)  HR: 87 (24 Sep 2019 06:15) (85 - 99)  BP: 120/50 (24 Sep 2019 06:15) (111/51 - 142/63)  BP(mean): --  RR: 18 (24 Sep 2019 06:15) (16 - 18)  SpO2: 94% (24 Sep 2019 06:15) (94% - 99%)      PHYSICAL EXAM:  Constitutional: NAD  Eyes: EOMI, sclera non-icteric  Neck: supple, no masses, no JVD  Respiratory: CTA b/l, good air entry b/l  Cardiovascular: RRR, no M/R/G  Gastrointestinal: soft, NTND, no masses palpable, + BS, no hepatosplenomegaly  Extremities: no c/c/e  Neurological: AAOx3      LABS:                           7.0    x     )-----------( x        ( 24 Sep 2019 09:14 )             23.7         Mean Cell Volume : 96.0 fl  Mean Cell Hemoglobin : 28.3 pg  Mean Cell Hemoglobin Concentration : 29.5 gm/dL  Auto Neutrophil # : x  Auto Lymphocyte # : x  Auto Monocyte # : x  Auto Eosinophil # : x  Auto Basophil # : x  Auto Neutrophil % : x  Auto Lymphocyte % : x  Auto Monocyte % : x  Auto Eosinophil % : x  Auto Basophil % : x      09-24    134<L>  |  98  |  22  ----------------------------<  93  4.1   |  20<L>  |  1.57<H>    Ca    8.8      24 Sep 2019 07:11  Phos  5.5     09-24  Mg     1.8     09-24    TPro  5.7<L>  /  Alb  3.4  /  TBili  0.7  /  DBili  x   /  AST  27  /  ALT  13  /  AlkPhos  111  09-23      PT/INR - ( 23 Sep 2019 19:06 )   PT: 13.8 sec;   INR: 1.20 ratio         PTT - ( 24 Sep 2019 07:40 )  PTT:42.9 sec    LDH 2188  Uric Acid 5.0    Complete Blood Count + Automated Diff (09.23.19 @ 14:26)    WBC Count: 118.08: History verifed. K/uL    RBC Count: 2.48 M/uL    Hemoglobin: 7.2: Test Repeated Specimen integrity verified. g/dL    Hematocrit: 23.7 %    Mean Cell Volume: 95.6 fl    Mean Cell Hemoglobin: 29.0 pg    Mean Cell Hemoglobin Conc: 30.4 gm/dL    Red Cell Distrib Width: 19.3 %    Platelet Count - Automated: 97: History Verified. K/uL    Auto Neutrophil #: 60.22 K/uL    Auto Lymphocyte #: 7.08 K/uL    Auto Monocyte #: 5.90 K/uL    Auto Eosinophil #: 0.00 K/uL    Auto Basophil #: 2.36 K/uL    Auto Neutrophil %: 44.0: Differential percentages must be correlated with absolute numbers for  clinical significance. %    Auto Lymphocyte %: 6.0 %    Auto Monocyte %: 5.0 %    Auto Eosinophil %: 0.0 %    Auto Basophil %: 2.0 %    Cultures:    Culture - Urine (09.23.19 @ 09:55)    Specimen Source: .Urine    Culture Results:   No growth    Culture - Blood (09.23.19 @ 01:11)    Specimen Source: .Blood    Culture Results:   No growth to date.    Culture - Blood (09.23.19 @ 01:11)    Specimen Source: .Blood    Culture Results:   No growth to date.        RADIOLOGY & ADDITIONAL STUDIES:    < from: CT Angio Chest w/ IV Cont (09.23.19 @ 00:09) >  IMPRESSION: 1.  No central pulmonary embolus. The subsegmental branches are not   evaluated secondary to respiratory motion.  2.  4.3 x 3.4 cm mass posterior left upper lobe. 1.3 cm anterior right   upper lobe nodule. These are likely neoplastic.  3.  Multiple hypodense lesions within the liver likely metastatic.  4.  Splenomegaly. Peripheral hypodensities within the spleen may   represent infarct or metastatic lesions or combination of both.      < from: Xray Chest 1 View- PORTABLE-Urgent (09.22.19 @ 23:16) >  Pulmonary edema.New large left upper lobe nodular opacity. A CT scan of the chest is   recommended for further evaluation. Diagnosis: r/o Acute leukemia    Protocol/Chemo Regimen: pending   Day: NA     Pt endorsed: Chronic HA; chronic leg pain/numbness; chronic rashes on back and LE; abd pain x few days, last BM on Saturday     Review of Systems: Patient denied nausea, vomiting, odynophagia, chest pain, cough, dyspnea, diarrhea, rash, fatigue    Pain scale:    8/10 head & leg                                        Diet: Carbo consistent; Glucerna    Allergies: No Known Allergies      ANTIMICROBIALS  piperacillin/tazobactam IVPB.. 3.375 Gram(s) IV Intermittent every 8 hours      HEME/ONC MEDICATIONS  hydroxyurea 1000 milliGRAM(s) Oral every 8 hours      STANDING MEDICATIONS  allopurinol 300 milliGRAM(s) Oral daily  dextrose 5%. 1000 milliLiter(s) IV Continuous <Continuous>  dextrose 50% Injectable 12.5 Gram(s) IV Push once  dextrose 50% Injectable 25 Gram(s) IV Push once  dextrose 50% Injectable 25 Gram(s) IV Push once  finasteride 5 milliGRAM(s) Oral daily  influenza   Vaccine 0.5 milliLiter(s) IntraMuscular once  insulin lispro (HumaLOG) corrective regimen sliding scale   SubCutaneous three times a day before meals  insulin lispro (HumaLOG) corrective regimen sliding scale   SubCutaneous at bedtime  lidocaine 2% Injectable 1 Vial(s) Local Injection once  melatonin 5 milliGRAM(s) Oral at bedtime  nicotine - 21 mG/24Hr(s) Patch 1 patch Transdermal daily  simvastatin 10 milliGRAM(s) Oral at bedtime  sodium chloride 0.9%. 1000 milliLiter(s) IV Continuous <Continuous>  tamsulosin 0.4 milliGRAM(s) Oral at bedtime      PRN MEDICATIONS  acetaminophen   Tablet .. 650 milliGRAM(s) Oral every 6 hours PRN  dextrose 40% Gel 15 Gram(s) Oral once PRN  glucagon  Injectable 1 milliGRAM(s) IntraMuscular once PRN  morphine  - Injectable 2 milliGRAM(s) IV Push every 4 hours PRN  zolpidem 5 milliGRAM(s) Oral at bedtime PRN      Vital Signs Last 24 Hrs  T(C): 37.9 (24 Sep 2019 06:15), Max: 37.9 (23 Sep 2019 17:41)  T(F): 100.3 (24 Sep 2019 06:15), Max: 100.3 (23 Sep 2019 17:41)  HR: 87 (24 Sep 2019 06:15) (85 - 99)  BP: 120/50 (24 Sep 2019 06:15) (111/51 - 142/63)  BP(mean): --  RR: 18 (24 Sep 2019 06:15) (16 - 18)  SpO2: 94% (24 Sep 2019 06:15) (94% - 99%)      PHYSICAL EXAM:  Constitutional: NAD  Eyes: EOMI, sclera non-icteric  Neck: supple, no masses, no JVD  Mouth: no ulcer  Respiratory: CTA b/l, good air entry b/l  Cardiovascular: RRR, no M/R/G  Gastrointestinal: soft, mild distended, mild diffuse tenderness,  no masses palpable, + BS, no hepatosplenomegaly  Extremities: no c/c/e  Skin: hyperpigmented upper back and lower feet; rashes on upper back-down raised multiple   Neurological: AAOx3  Peripheral IV: C/D/I        LABS:                      7.5    113.0 )-----------( 89       ( 24 Sep 2019 13:49 )             26.1         Mean Cell Volume : 94.2 fl  Mean Cell Hemoglobin : 26.9 pg  Mean Cell Hemoglobin Concentration : 28.6 gm/dL  Auto Neutrophil # : See Note  Auto Lymphocyte # : See Note  Auto Monocyte # : See Note  Auto Eosinophil # : See Note  Auto Basophil # : See Note  Auto Neutrophil % : 25.0 %  Auto Lymphocyte % : 4.0 %  Auto Monocyte % : 35.0 %  Auto Eosinophil % : 1.0 %  Auto Basophil % : x      09-24    134<L>  |  97  |  26<H>  ----------------------------<  126<H>  3.9   |  20<L>  |  1.68<H>    Ca    8.7      24 Sep 2019 13:49  Phos  6.4     09-24  Mg     1.9     09-24    TPro  5.9<L>  /  Alb  3.1<L>  /  TBili  0.8  /  DBili  x   /  AST  39  /  ALT  13  /  AlkPhos  130<H>  09-24    PT/INR - ( 24 Sep 2019 13:49 )   PT: 14.6 sec;   INR: 1.26 ratio         PTT - ( 24 Sep 2019 07:40 )  PTT:42.9 sec    LDH 2161  Uric Acid 5.1          Cultures:    Culture - Urine (09.23.19 @ 09:55)    Specimen Source: .Urine    Culture Results:   No growth    Culture - Blood (09.23.19 @ 01:11)    Specimen Source: .Blood    Culture Results:   No growth to date.    Culture - Blood (09.23.19 @ 01:11)    Specimen Source: .Blood    Culture Results:   No growth to date.    Flow Cytometry Order. (09.23.19 @ 18:30)    TM Interpretation:   Flow Cytometry Final Report  ________________________________________________________________________  Specimen: Bone marrow aspirate  Collected: 09/23/2019 18:10  Received: 09/23/2019 18:30  Processed: 09/23/2019 18:20  Reported: 09/24/2019 13:07  Accession #: [id number]  FL -KM  ________________________________________________________________________  CLINICAL DATA: Rule out acute leukemia    ________________________________________________________________________  CD45/Side Scatter Differential  Granulocytes: 56 % ;    Lymphocytes: 2 % ;    Monocytes: 26 % ;    Dim CD45 and/or blast gate: 5 %  ;    Erythroid/debris: 8 %  ________________________________________________________________________  DIAGNOSIS:  Bone marrow aspirate:       - The myeloid immunophenotypic findings show decreased myeloid granularity; small population  of myeloblasts (3% of cells), positive for HLA-DR, CD34, , CD33, CD13, partial CD7; negative  for CD2, CD4, CD11b, CD15, CD19, CD56; increased monocytes (22% of cells) showing dimmer CD33,  dimmer HLA-DR and partial CD56 expression. Please see interpretation.    INTERPRETATION:  MORPHOLOGY: Myeloid predominant trilineage hematopoiesis with dysplasia; few blasts.  CYTOSPIN: Maturing and maturemyeloid elements with slightly increased monocytes (mild  hemodilution).    IMMUNOPHENOTYPE: CD45/side scatter shows decreased myeloid granularity. Myeloblasts (3% of cells),  positive for HLA-DR, CD34, , CD33, CD13, partial CD7; negative for CD2, CD4, CD11b, CD15,  CD19, CD56. Increased monocytes (22% of cells) with dimmer CD33, dimmer HLA-DR and partial CD56.    The myeloid immunophenotypic findings show decreased myeloid granularity; small population of  myeloblasts (3% of cells), positive for HLA-DR, CD34, , CD33, CD13, partial CD7; negative for  CD2, CD4, CD11b, CD15, CD19, CD56; increased monocytes (22% of cells) with dimmer CD33, dimmer  HLA-DR and partial CD56 expression.    _____________________________________________________________________  Viability ................. 98.8%    Results reported are based on an open gate.  CD45 .......... 92 %  CD2 ........... 2 %  CD3 ........... 2 %  CD5 ........... 2 %  CD7 ........... 4 %  CD4 ........... 1 %  CD8 ........... 1 %  CD16 .......... 34 %  CD56........... 5 %  CD10 .......... 40 %  CD19 .......... 0 %  CD20 .......... 0 %  CD22 .......... 0 %  kappa ......... 0 %  lambda ........ 0 %  CD14 .......... 26 %  CD64 .......... 87 %  HLA-DR ........ 6 %  CD34 .......... 4 %   ......... 3 %  CD11b ......... 84 %  CD13 .......... 90 %  CD15 .......... 83 %  CD33 .......... 88 %  CD38 .......... 87 %  CD41 .......... 2 %  CD61 .......... 2 %  Glycophorin A . 7 %  Iron Stain: DONE      RADIOLOGY & ADDITIONAL STUDIES:    < from: Transthoracic Echocardiogram (09.24.19 @ 06:08) >  Conclusions: EF 65%  Normal left ventricular systolic function. No segmental  wall motion abnormalities.  Mild pulmonary hypertension.    < from: CT Angio Chest w/ IV Cont (09.23.19 @ 00:09) >  IMPRESSION: 1.  No central pulmonary embolus. The subsegmental branches are not   evaluated secondary to respiratory motion.  2.  4.3 x 3.4 cm mass posterior left upper lobe. 1.3 cm anterior right   upper lobe nodule. These are likely neoplastic.  3.  Multiple hypodense lesions within the liver likely metastatic.  4.  Splenomegaly. Peripheral hypodensities within the spleen may   represent infarct or metastatic lesions or combination of both.      < from: Xray Chest 1 View- PORTABLE-Urgent (09.22.19 @ 23:16) >  Pulmonary edema.New large left upper lobe nodular opacity. A CT scan of the chest is   recommended for further evaluation.

## 2019-09-24 NOTE — PROGRESS NOTE ADULT - PROBLEM SELECTOR PLAN 5
Not on meds now  Monitor closely Not on meds now  To resume Amlodipine 5 mg qd  with hold parameter  Monitor closely To resume Amlodipine 5 mg qd  with hold parameter  Monitor closely

## 2019-09-24 NOTE — PROGRESS NOTE ADULT - PROBLEM SELECTOR PLAN 4
on ASA 81 mg QD, hold for possible central line placement s/p stent 9/2019  on ASA 81 mg QD, hold for possible central line placement s/p stent 9/2019  on ASA 81 mg QD, hold for possible central line placement  Continue Bisoprolol 5mg qd d with hold parameter s/p stent 9/2019  EKG 9/24 NSR  on ASA 81 mg QD, hold for possible central line placement  Continue Bisoprolol 5mg qd d with hold parameter

## 2019-09-24 NOTE — DIETITIAN INITIAL EVALUATION ADULT. - REASON INDICATOR FOR ASSESSMENT
Pt seen for consult for assessment.  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 Noted pt c JOE. 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

## 2019-09-24 NOTE — PROGRESS NOTE ADULT - ASSESSMENT
84M with DM, HTN, CAD s/p stents, CVA in the past, known PVD s/p stents many years ago in [place] on cilostazol presented with fevers and shortness of breath found to have leukocytosis to 129, vascular surgery consulted for Multifocal large peripheral areas of hypodensity/ hypoenhancement in the spleen to rule out splenic infarcts.     stable from vasc standpoint  reconsult prn

## 2019-09-24 NOTE — PROVIDER CONTACT NOTE (CRITICAL VALUE NOTIFICATION) - SITUATION
Pt admitted with PNA and SOB, Has HX of lung cancer with METS. Hemoglobin is 7.0 Pt is asymptomatic, no s/s of distress noted

## 2019-09-24 NOTE — DIETITIAN INITIAL EVALUATION ADULT. - ADD RECOMMEND
1. Recommend Glucerna shake x2 daily. 2. Encouraged PO intake-small, frequent meals and nutrient dense snacks. In house, encouraged pt to order nutrient dense snacks c meals to consume in between meals to mimic small, frequent meals. Discussed protein rich foods available on menu. Discussed consuming protein first at meal times and then eating the other foods. Discussed low sodium diet restrictions and encouraged intake as tolerated.

## 2019-09-24 NOTE — DIETITIAN INITIAL EVALUATION ADULT. - PERTINENT LABORATORY DATA
9/24: sodium 131, Cr 1.57, POCT glucose: 9/24: 112, 9/23: 131, A1C 5.7%-indicated good glycemic control versus suboptimal intake

## 2019-09-24 NOTE — DISCHARGE NOTE PROVIDER - NSDCFUADDAPPT_GEN_ALL_CORE_FT
To the [facility] to see Dr. Cabral To the [facility] to see Dr. Cabral on  Call for follow up with Dr. Alfaro (Dermatology) with in one week of discharge To the [facility] to see Dr. Cabral on Monday 9/30 at 2pm  Call for follow up with Dr. Alfaro (Dermatology) with in one week of discharge To the [facility] [place] to see Dr. Cabral on Monday 10/9/19 at 9am.  Call for follow up with Dr. Alfaro (Dermatology) with in one week of discharge To the [facility] to see Dr. Cabral on Monday 10/3/19 @ 1.30 pm followed by appointment for possible platelets on the same day @ 3PM (PLEASE ARRIVE AT 1 PM TO [facility] SINCE THIS IS YOUR FIRST APPOINTMENT)    To the [facility] for possible platelets on 10/4/19 @ 4 pm for possible platelets.     Call for follow up with Dr. Alfaro (Dermatology) with in one week of discharge

## 2019-09-24 NOTE — DISCHARGE NOTE PROVIDER - NSDCFUSCHEDAPPT_GEN_ALL_CORE_FT
SIOBHAN EPPERSON ; 10/09/2019 ; [facility] SIOBHAN EPPERSON ; 10/03/2019 ; [facility]  SIOBHAN EPPERSON ; 10/03/2019 ; [facility]  SIOBHAN EPPERSON ; 10/03/2019 ; [facility]  SIOBHAN EPPERSON ; 10/04/2019 ; [facility]

## 2019-09-24 NOTE — DIETITIAN INITIAL EVALUATION ADULT. - PROBLEM SELECTOR PLAN 1
- SOB multifactorial, 2/2 lung cancer, PNA, HF, and acute leukemia  - was fluid overload on exam with 2+ b/l pitting edema to the knee, diffuse crackles on lung exam   - given borderline hypotension, will hold lasix, TTE ordered   - EKG showed NSR, no ST elevation/depression, T wave inversion, negative troponin   - CT angio negative for PE, but showed 4.7cm mass in EMILE, and 1.3cm mass in RUL, Mild right lower lobe pneumonia/aspiration  - currently does not require supplement O2, can give supplement O2 PRN   - VBG showed pH 7.47, lactate of 5.2  - c/w vanco and zosyn for PNA, vanco bilevel

## 2019-09-24 NOTE — PROGRESS NOTE ADULT - PROBLEM SELECTOR PLAN 10
Heparin sc d/c'ed due to pending worsening thrombocytopenia  Encourage ambulation      Contact info [phone]

## 2019-09-24 NOTE — CHART NOTE - NSCHARTNOTEFT_GEN_A_CORE
LABS:               7.5    113.0 )-----------( 89       ( 24 Sep 2019 13:49 )             26.1         Mean Cell Volume : 94.2 fl  Mean Cell Hemoglobin : 26.9 pg  Mean Cell Hemoglobin Concentration : 28.6 gm/dL  Auto Neutrophil # : See Note  Auto Lymphocyte # : See Note  Auto Monocyte # : See Note  Auto Eosinophil # : See Note  Auto Basophil # : See Note  Auto Neutrophil % : 25.0 %  Auto Lymphocyte % : 4.0 %  Auto Monocyte % : 35.0 %  Auto Eosinophil % : 1.0 %  Auto Basophil % : x      09-24    134<L>  |  97  |  26<H>  ----------------------------<  126<H>  3.9   |  20<L>  |  1.68<H>    Ca    8.7      24 Sep 2019 13:49  Phos  6.4     09-24  Mg     1.9     09-24    TPro  5.9<L>  /  Alb  3.1<L>  /  TBili  0.8  /  DBili  x   /  AST  39  /  ALT  13  /  AlkPhos  130<H>  09-24        PT/INR - ( 24 Sep 2019 13:49 )   PT: 14.6 sec;   INR: 1.26 ratio         PTT - ( 24 Sep 2019 07:40 )  PTT:42.9 sec    LDH 2161  Uric Acid 5.1    increased hyperphosphatemia  added phoslo 667 QID  recheck TLS lab at 10pm tonight  Switched Zosyn to Levaquin per Dr Cabral  Per Dr Yancey, resident, hold ASA and Cilostazol for now for poss port placement  Pt needs ID clearance per IR, need to call in am

## 2019-09-24 NOTE — PROGRESS NOTE ADULT - PROBLEM SELECTOR PLAN 3
- meets severe sepsis criteria: fever, leukocytosis, lactate of 5.2 at admission (repeated 2.9), + RLL PNA/ aspiration   - s/p vancomycin x1, and zosyn x1, s/p 250mL IVF x1  - RVP negative, U/A neg, legionella neg, BCx NGTD  - will continue vancomycin and zosyn

## 2019-09-24 NOTE — PROGRESS NOTE ADULT - PROBLEM SELECTOR PLAN 9
- BP soft, will hold h ome amlodipine and bisoprolol    # Splenic infarct on CT angio   -Vascular recommends venous phase IV contrast CT abdomen; will wait 24 hours since patient just had CT angio and has current JOE  -start heparin drip when okay to do post bone marrow biopsy per heme/onc    #Abdominal pain  -Likely secondary to mets/infarcts  -Will trend exams  -Pending CT as above  -Morphine 2mg IV q4h PRN for pain  - per pt and family, aware of the infarct for many years  - f/u vascular surgery recs

## 2019-09-24 NOTE — PROGRESS NOTE ADULT - ATTENDING COMMENTS
83 yo M Mandarin-speaking, p/w leukocytosis also with lung masses ? lung CA, transferred to leukemia floor for management    -BM bx done on 9/23 -f/u results  -increase Hydrea to 1gram q8hrs given wbc still high; will check labs 2x/day  -monitor for tumor lysis syndrome -needs IVF with close monitoring for pulm edema. Allopurinol  -hx CAD and stents -on ASA 81mg as long as plt>50. Will get Echo. Repeat EKG today was normal sinus rhythm - prior EKG showed A. flutter  -hep B, C, and HIV pending  -consider Port placement prior to chemo; consider liver bx. Will get CT A/P with contrast to eval liver lesions. Family aware of both liver/lung masses as well as ?leukemia  -physical therapy eval

## 2019-09-24 NOTE — DIETITIAN INITIAL EVALUATION ADULT. - PROBLEM SELECTOR PLAN 8
- recent dx of lung mass in [facility], pt does not have bx results with him, will likely need another bx   - oncology in AM

## 2019-09-24 NOTE — DIETITIAN INITIAL EVALUATION ADULT. - PROBLEM SELECTOR PLAN 10
BPH: c/w Proscar and Flomax  current day smoker: nicotine patch   DVT prophylaxis: heparin sQ   Diet: DM/DASH diet (per pt and family, able to swallow normal food)  GOC: discussed with family and pt at bedside, currently full code, would want chemotherapy if offered   PT consult  Nutrition consult

## 2019-09-24 NOTE — DIETITIAN INITIAL EVALUATION ADULT. - FACTORS AFF FOOD INTAKE
pt reports suboptimal appetite and intake at this time, partially due to constipation, reports no BM in about 4 days- spouse reports pt usually takes stool softeners at home; denies any chewing/swallowing issues

## 2019-09-24 NOTE — PROGRESS NOTE ADULT - PROBLEM SELECTOR PLAN 1
- WBC of 129  with 20% blasts, 11% metameylocytes, 7% meylocytes  - Pt reports that he had leukocytosis (40k) in the past, had BM bx in [place] about 2 years ago. He was told that no treatment was required at that time. No report here.  -peripheral smear reviewed (9/23): numerous blasts, metamyelocytes, myelocytes, nucleated RBCs, some clumped platelets.   -Low suspicion of APL with no visible blasts with bilobed nucleus  and normal coags  -no signs of leukostasis currently; blasts ~40K, does not need emergent leukophoresis  -c/w allopurinol 300 mg qd  -IVF hydration  9/24 increase  hydrea1 gm q8h  -BM biopsy done on 9/23, foundation testing also sent  -monitor labs q12h CBC w/diff, TLS, DIC panel, CMP  -fu peripheral flow cytometry  -transfuse to keep Hg>7 and plts >10 or 15 if febrile - WBC of 129  with 20% blasts, 11% metameylocytes, 7% meylocytes  - Pt reports that he had leukocytosis (40k) in the past, had BM bx in [place] about 2 years ago. He was told that no treatment was required at that time. No report here.  -peripheral smear reviewed (9/23): numerous blasts, metamyelocytes, myelocytes, nucleated RBCs, some clumped platelets.   -Low suspicion of APL with no visible blasts with bilobed nucleus  and normal coags  -no signs of leukostasis currently; blasts ~40K, does not need emergent leukophoresis  -c/w allopurinol 300 mg qd  -IVF hydration with NS at 75 cc/hr  9/24 increase  hydrea1 gm q8h  -BM biopsy done on 9/23, foundation testing also sent  -monitor labs q12h CBC w/diff, TLS, DIC panel, CMP  -fu peripheral flow cytometry  -transfuse to keep Hg>7 and plts >10 or 15 if febrile  Derm consult for chronic rashes upper back and feet  Requested IR port placement, pending IR eval - WBC of 129  with 20% blasts, 11% metameylocytes, 7% meylocytes  - Pt reports that he had leukocytosis (40k) in the past, had BM bx in [place] about 2 years ago. He was told that no treatment was required at that time. No report here.  -peripheral smear reviewed (9/23): numerous blasts, metamyelocytes, myelocytes, nucleated RBCs, some clumped platelets.   -Low suspicion of APL with no visible blasts with bilobed nucleus  and normal coags  -no signs of leukostasis currently; blasts ~40K, does not need emergent leukophoresis  -c/w allopurinol 300 mg qd  -IVF hydration with NS at 75 cc/hr  9/24 increase  hydrea1 gm q8h  -BM biopsy done on 9/23, foundation testing also sent  -monitor labs q12h CBC w/diff, TLS, DIC panel, CMP  -fu peripheral flow cytometry  -transfuse to keep Hg>7 and plts >10 or 15 if febrile    Requested IR port placement, pending IR eval

## 2019-09-24 NOTE — PROGRESS NOTE ADULT - SUBJECTIVE AND OBJECTIVE BOX
Patient is a 84y old  Male who presents with a chief complaint of 84M w/ generalized weakness and SOB (24 Sep 2019 17:30)      Vascular Surgery Attending Progress Note    Interval HPI: pt w/o c/o     Medications:  acetaminophen   Tablet .. 650 milliGRAM(s) Oral every 6 hours PRN  allopurinol 300 milliGRAM(s) Oral daily  amLODIPine   Tablet 5 milliGRAM(s) Oral daily  AQUAPHOR (petrolatum Ointment) 1 Application(s) Topical two times a day  Biotene Dry Mouth Oral Rinse 5 milliLiter(s) Swish and Spit five times a day  bisoprolol   Tablet 5 milliGRAM(s) Oral daily  calcium acetate 667 milliGRAM(s) Oral four times a day with meals  camphor 0.5%/menthol 0.5% Topical Lotion 1 Application(s) Topical two times a day  dextrose 40% Gel 15 Gram(s) Oral once PRN  dextrose 5%. 1000 milliLiter(s) IV Continuous <Continuous>  dextrose 50% Injectable 12.5 Gram(s) IV Push once  dextrose 50% Injectable 25 Gram(s) IV Push once  dextrose 50% Injectable 25 Gram(s) IV Push once  docusate sodium 100 milliGRAM(s) Oral three times a day  finasteride 5 milliGRAM(s) Oral daily  glucagon  Injectable 1 milliGRAM(s) IntraMuscular once PRN  hydroxyurea 500 milliGRAM(s) Oral every 8 hours  influenza   Vaccine 0.5 milliLiter(s) IntraMuscular once  insulin lispro (HumaLOG) corrective regimen sliding scale   SubCutaneous three times a day before meals  insulin lispro (HumaLOG) corrective regimen sliding scale   SubCutaneous at bedtime  lidocaine 2% Injectable 1 Vial(s) Local Injection once  melatonin 5 milliGRAM(s) Oral at bedtime  morphine  - Injectable 2 milliGRAM(s) IV Push every 4 hours PRN  nicotine - 21 mG/24Hr(s) Patch 1 patch Transdermal daily  simvastatin 10 milliGRAM(s) Oral at bedtime  sodium chloride 0.9%. 1000 milliLiter(s) IV Continuous <Continuous>  tamsulosin 0.4 milliGRAM(s) Oral at bedtime  triamcinolone 0.1% Ointment 1 Application(s) Topical two times a day  zolpidem 5 milliGRAM(s) Oral at bedtime PRN      Vital Signs Last 24 Hrs  T(C): 38.1 (24 Sep 2019 17:32), Max: 38.1 (24 Sep 2019 17:32)  T(F): 100.6 (24 Sep 2019 17:32), Max: 100.6 (24 Sep 2019 17:32)  HR: 90 (24 Sep 2019 17:32) (85 - 99)  BP: 125/55 (24 Sep 2019 17:32) (107/55 - 132/62)  BP(mean): --  RR: 16 (24 Sep 2019 17:32) (16 - 18)  SpO2: 96% (24 Sep 2019 17:32) (94% - 99%)  I&O's Summary    23 Sep 2019 07:01  -  24 Sep 2019 07:00  --------------------------------------------------------  IN: 545 mL / OUT: 1000 mL / NET: -455 mL    24 Sep 2019 07:01  -  24 Sep 2019 20:16  --------------------------------------------------------  IN: 830 mL / OUT: 200 mL / NET: 630 mL        Physical Exam:  Neuro  A&Ox3 VSS  Abd soft nt nd   Vascular:  stable     LABS:                        7.5    113.0 )-----------( 89       ( 24 Sep 2019 13:49 )             26.1     09-24    134<L>  |  97  |  26<H>  ----------------------------<  126<H>  3.9   |  20<L>  |  1.68<H>    Ca    8.7      24 Sep 2019 13:49  Phos  6.4     09-24  Mg     1.9     09-24    TPro  5.9<L>  /  Alb  3.1<L>  /  TBili  0.8  /  DBili  x   /  AST  39  /  ALT  13  /  AlkPhos  130<H>  09-24    PT/INR - ( 24 Sep 2019 13:49 )   PT: 14.6 sec;   INR: 1.26 ratio         PTT - ( 24 Sep 2019 07:40 )  PTT:42.9 sec    MARLINE CHONG MD  [phone]

## 2019-09-25 DIAGNOSIS — E11.9 TYPE 2 DIABETES MELLITUS WITHOUT COMPLICATIONS: ICD-10-CM

## 2019-09-25 DIAGNOSIS — I73.9 PERIPHERAL VASCULAR DISEASE, UNSPECIFIED: ICD-10-CM

## 2019-09-25 DIAGNOSIS — B99.9 UNSPECIFIED INFECTIOUS DISEASE: ICD-10-CM

## 2019-09-25 LAB
ALBUMIN SERPL ELPH-MCNC: 3 G/DL — LOW (ref 3.3–5)
ALBUMIN SERPL ELPH-MCNC: 3.1 G/DL — LOW (ref 3.3–5)
ALBUMIN SERPL ELPH-MCNC: 3.2 G/DL — LOW (ref 3.3–5)
ALP SERPL-CCNC: 125 U/L — HIGH (ref 40–120)
ALP SERPL-CCNC: 129 U/L — HIGH (ref 40–120)
ALP SERPL-CCNC: 136 U/L — HIGH (ref 40–120)
ALT FLD-CCNC: 11 U/L — SIGNIFICANT CHANGE UP (ref 10–45)
ALT FLD-CCNC: 13 U/L — SIGNIFICANT CHANGE UP (ref 10–45)
ALT FLD-CCNC: 15 U/L — SIGNIFICANT CHANGE UP (ref 10–45)
ANION GAP SERPL CALC-SCNC: 17 MMOL/L — SIGNIFICANT CHANGE UP (ref 5–17)
ANION GAP SERPL CALC-SCNC: 17 MMOL/L — SIGNIFICANT CHANGE UP (ref 5–17)
ANION GAP SERPL CALC-SCNC: 19 MMOL/L — HIGH (ref 5–17)
APPEARANCE UR: ABNORMAL
APTT BLD: 33.2 SEC — SIGNIFICANT CHANGE UP (ref 27.5–36.3)
AST SERPL-CCNC: 28 U/L — SIGNIFICANT CHANGE UP (ref 10–40)
AST SERPL-CCNC: 31 U/L — SIGNIFICANT CHANGE UP (ref 10–40)
AST SERPL-CCNC: 32 U/L — SIGNIFICANT CHANGE UP (ref 10–40)
BASOPHILS # BLD AUTO: 0.1 K/UL — SIGNIFICANT CHANGE UP (ref 0–0.2)
BASOPHILS NFR BLD AUTO: 0 % — SIGNIFICANT CHANGE UP (ref 0–2)
BILIRUB SERPL-MCNC: 0.6 MG/DL — SIGNIFICANT CHANGE UP (ref 0.2–1.2)
BILIRUB SERPL-MCNC: 0.7 MG/DL — SIGNIFICANT CHANGE UP (ref 0.2–1.2)
BILIRUB SERPL-MCNC: 0.8 MG/DL — SIGNIFICANT CHANGE UP (ref 0.2–1.2)
BILIRUB UR-MCNC: NEGATIVE — SIGNIFICANT CHANGE UP
BUN SERPL-MCNC: 28 MG/DL — HIGH (ref 7–23)
BUN SERPL-MCNC: 30 MG/DL — HIGH (ref 7–23)
BUN SERPL-MCNC: 38 MG/DL — HIGH (ref 7–23)
CALCIUM SERPL-MCNC: 8.4 MG/DL — SIGNIFICANT CHANGE UP (ref 8.4–10.5)
CALCIUM SERPL-MCNC: 8.5 MG/DL — SIGNIFICANT CHANGE UP (ref 8.4–10.5)
CALCIUM SERPL-MCNC: 8.7 MG/DL — SIGNIFICANT CHANGE UP (ref 8.4–10.5)
CHLORIDE SERPL-SCNC: 96 MMOL/L — SIGNIFICANT CHANGE UP (ref 96–108)
CO2 SERPL-SCNC: 18 MMOL/L — LOW (ref 22–31)
CO2 SERPL-SCNC: 18 MMOL/L — LOW (ref 22–31)
CO2 SERPL-SCNC: 19 MMOL/L — LOW (ref 22–31)
COLOR SPEC: YELLOW — SIGNIFICANT CHANGE UP
CREAT SERPL-MCNC: 1.64 MG/DL — HIGH (ref 0.5–1.3)
CREAT SERPL-MCNC: 1.65 MG/DL — HIGH (ref 0.5–1.3)
CREAT SERPL-MCNC: 1.68 MG/DL — HIGH (ref 0.5–1.3)
D DIMER BLD IA.RAPID-MCNC: 6837 NG/ML DDU — HIGH
DIFF PNL FLD: ABNORMAL
EOSINOPHIL # BLD AUTO: 1.4 K/UL — HIGH (ref 0–0.5)
EOSINOPHIL NFR BLD AUTO: 1 % — SIGNIFICANT CHANGE UP (ref 0–6)
FIBRINOGEN PPP-MCNC: 464 MG/DL — SIGNIFICANT CHANGE UP (ref 350–510)
GLUCOSE BLDC GLUCOMTR-MCNC: 160 MG/DL — HIGH (ref 70–99)
GLUCOSE BLDC GLUCOMTR-MCNC: 166 MG/DL — HIGH (ref 70–99)
GLUCOSE BLDC GLUCOMTR-MCNC: 173 MG/DL — HIGH (ref 70–99)
GLUCOSE BLDC GLUCOMTR-MCNC: 199 MG/DL — HIGH (ref 70–99)
GLUCOSE BLDC GLUCOMTR-MCNC: 256 MG/DL — HIGH (ref 70–99)
GLUCOSE SERPL-MCNC: 154 MG/DL — HIGH (ref 70–99)
GLUCOSE SERPL-MCNC: 182 MG/DL — HIGH (ref 70–99)
GLUCOSE SERPL-MCNC: 225 MG/DL — HIGH (ref 70–99)
GLUCOSE UR QL: NEGATIVE — SIGNIFICANT CHANGE UP
HAV IGM SER-ACNC: SIGNIFICANT CHANGE UP
HBV CORE AB SER-ACNC: REACTIVE
HBV CORE IGM SER-ACNC: SIGNIFICANT CHANGE UP
HBV SURFACE AB SER-ACNC: SIGNIFICANT CHANGE UP
HBV SURFACE AG SER-ACNC: SIGNIFICANT CHANGE UP
HCT VFR BLD CALC: 27 % — LOW (ref 39–50)
HCV AB S/CO SERPL IA: 0.15 S/CO — SIGNIFICANT CHANGE UP (ref 0–0.99)
HCV AB SERPL-IMP: SIGNIFICANT CHANGE UP
HEMATOPATHOLOGY REPORT: SIGNIFICANT CHANGE UP
HGB BLD-MCNC: 7.8 G/DL — LOW (ref 13–17)
INR BLD: 1.23 RATIO — HIGH (ref 0.88–1.16)
KETONES UR-MCNC: NEGATIVE — SIGNIFICANT CHANGE UP
LDH SERPL L TO P-CCNC: 1987 U/L — HIGH (ref 50–242)
LDH SERPL L TO P-CCNC: 2008 U/L — HIGH (ref 50–242)
LDH SERPL L TO P-CCNC: 2102 U/L — HIGH (ref 50–242)
LEUKOCYTE ESTERASE UR-ACNC: NEGATIVE — SIGNIFICANT CHANGE UP
LYMPHOCYTES # BLD AUTO: 3 % — LOW (ref 13–44)
LYMPHOCYTES # BLD AUTO: 5.7 K/UL — HIGH (ref 1–3.3)
MAGNESIUM SERPL-MCNC: 1.9 MG/DL — SIGNIFICANT CHANGE UP (ref 1.6–2.6)
MCHC RBC-ENTMCNC: 27 PG — SIGNIFICANT CHANGE UP (ref 27–34)
MCHC RBC-ENTMCNC: 29 GM/DL — LOW (ref 32–36)
MCV RBC AUTO: 93.1 FL — SIGNIFICANT CHANGE UP (ref 80–100)
MONOCYTES # BLD AUTO: 27.8 K/UL — HIGH (ref 0–0.9)
MONOCYTES NFR BLD AUTO: 31 % — HIGH (ref 2–14)
NEUTROPHILS # BLD AUTO: SIGNIFICANT CHANGE UP (ref 1.8–7.4)
NEUTROPHILS NFR BLD AUTO: 48 % — SIGNIFICANT CHANGE UP (ref 43–77)
NITRITE UR-MCNC: NEGATIVE — SIGNIFICANT CHANGE UP
PH UR: 6 — SIGNIFICANT CHANGE UP (ref 5–8)
PHOSPHATE SERPL-MCNC: 5.8 MG/DL — HIGH (ref 2.5–4.5)
PHOSPHATE SERPL-MCNC: 5.9 MG/DL — HIGH (ref 2.5–4.5)
PHOSPHATE SERPL-MCNC: 6.1 MG/DL — HIGH (ref 2.5–4.5)
PLATELET # BLD AUTO: 92 K/UL — LOW (ref 150–400)
POTASSIUM SERPL-MCNC: 3.6 MMOL/L — SIGNIFICANT CHANGE UP (ref 3.5–5.3)
POTASSIUM SERPL-MCNC: 3.7 MMOL/L — SIGNIFICANT CHANGE UP (ref 3.5–5.3)
POTASSIUM SERPL-MCNC: 4.2 MMOL/L — SIGNIFICANT CHANGE UP (ref 3.5–5.3)
POTASSIUM SERPL-SCNC: 3.6 MMOL/L — SIGNIFICANT CHANGE UP (ref 3.5–5.3)
POTASSIUM SERPL-SCNC: 3.7 MMOL/L — SIGNIFICANT CHANGE UP (ref 3.5–5.3)
POTASSIUM SERPL-SCNC: 4.2 MMOL/L — SIGNIFICANT CHANGE UP (ref 3.5–5.3)
PROT SERPL-MCNC: 5.5 G/DL — LOW (ref 6–8.3)
PROT SERPL-MCNC: 5.8 G/DL — LOW (ref 6–8.3)
PROT SERPL-MCNC: 6 G/DL — SIGNIFICANT CHANGE UP (ref 6–8.3)
PROT UR-MCNC: ABNORMAL
PROTHROM AB SERPL-ACNC: 14.1 SEC — HIGH (ref 10–12.9)
RBC # BLD: 2.9 M/UL — LOW (ref 4.2–5.8)
RBC # FLD: 18.6 % — HIGH (ref 10.3–14.5)
SODIUM SERPL-SCNC: 131 MMOL/L — LOW (ref 135–145)
SODIUM SERPL-SCNC: 132 MMOL/L — LOW (ref 135–145)
SODIUM SERPL-SCNC: 133 MMOL/L — LOW (ref 135–145)
SP GR SPEC: 1.03 — HIGH (ref 1.01–1.02)
URATE SERPL-MCNC: 3.7 MG/DL — SIGNIFICANT CHANGE UP (ref 3.4–8.8)
URATE SERPL-MCNC: 3.9 MG/DL — SIGNIFICANT CHANGE UP (ref 3.4–8.8)
URATE SERPL-MCNC: 4 MG/DL — SIGNIFICANT CHANGE UP (ref 3.4–8.8)
UROBILINOGEN FLD QL: SIGNIFICANT CHANGE UP
WBC # BLD: 109 K/UL — CRITICAL HIGH (ref 3.8–10.5)
WBC # FLD AUTO: 109 K/UL — CRITICAL HIGH (ref 3.8–10.5)

## 2019-09-25 PROCEDURE — 99232 SBSQ HOSP IP/OBS MODERATE 35: CPT | Mod: GC

## 2019-09-25 PROCEDURE — 99223 1ST HOSP IP/OBS HIGH 75: CPT | Mod: GC

## 2019-09-25 PROCEDURE — 99223 1ST HOSP IP/OBS HIGH 75: CPT

## 2019-09-25 RX ORDER — PRAMOXINE HYDROCHLORIDE 150 MG/15G
1 AEROSOL, FOAM RECTAL
Refills: 0 | Status: DISCONTINUED | OUTPATIENT
Start: 2019-09-25 | End: 2019-10-02

## 2019-09-25 RX ORDER — TENOFOVIR DISOPROXIL FUMARATE 300 MG/1
300 TABLET, FILM COATED ORAL DAILY
Refills: 0 | Status: DISCONTINUED | OUTPATIENT
Start: 2019-09-25 | End: 2019-09-25

## 2019-09-25 RX ORDER — HYDROXYUREA 500 MG/1
1000 CAPSULE ORAL EVERY 8 HOURS
Refills: 0 | Status: DISCONTINUED | OUTPATIENT
Start: 2019-09-25 | End: 2019-09-28

## 2019-09-25 RX ORDER — CHLORHEXIDINE GLUCONATE 213 G/1000ML
1 SOLUTION TOPICAL
Refills: 0 | Status: DISCONTINUED | OUTPATIENT
Start: 2019-09-25 | End: 2019-10-02

## 2019-09-25 RX ORDER — TENOFOVIR DISOPROXIL FUMARATE 300 MG/1
300 TABLET, FILM COATED ORAL
Refills: 0 | Status: DISCONTINUED | OUTPATIENT
Start: 2019-09-25 | End: 2019-10-02

## 2019-09-25 RX ORDER — CALCIUM ACETATE 667 MG
667 TABLET ORAL
Refills: 0 | Status: COMPLETED | OUTPATIENT
Start: 2019-09-25 | End: 2019-09-25

## 2019-09-25 RX ORDER — ALLOPURINOL 300 MG
100 TABLET ORAL DAILY
Refills: 0 | Status: DISCONTINUED | OUTPATIENT
Start: 2019-09-26 | End: 2019-09-27

## 2019-09-25 RX ADMIN — BISOPROLOL FUMARATE 5 MILLIGRAM(S): 10 TABLET, FILM COATED ORAL at 12:17

## 2019-09-25 RX ADMIN — TAMSULOSIN HYDROCHLORIDE 0.4 MILLIGRAM(S): 0.4 CAPSULE ORAL at 21:18

## 2019-09-25 RX ADMIN — Medication 100 MILLIGRAM(S): at 21:17

## 2019-09-25 RX ADMIN — Medication 5 MILLILITER(S): at 12:20

## 2019-09-25 RX ADMIN — PRAMOXINE HYDROCHLORIDE 1 APPLICATION(S): 150 AEROSOL, FOAM RECTAL at 18:13

## 2019-09-25 RX ADMIN — ZOLPIDEM TARTRATE 5 MILLIGRAM(S): 10 TABLET ORAL at 21:28

## 2019-09-25 RX ADMIN — Medication 667 MILLIGRAM(S): at 12:25

## 2019-09-25 RX ADMIN — Medication 667 MILLIGRAM(S): at 18:14

## 2019-09-25 RX ADMIN — Medication 300 MILLIGRAM(S): at 12:18

## 2019-09-25 RX ADMIN — Medication 5 MILLIGRAM(S): at 21:17

## 2019-09-25 RX ADMIN — Medication 650 MILLIGRAM(S): at 09:32

## 2019-09-25 RX ADMIN — SODIUM CHLORIDE 75 MILLILITER(S): 9 INJECTION INTRAMUSCULAR; INTRAVENOUS; SUBCUTANEOUS at 08:44

## 2019-09-25 RX ADMIN — SIMVASTATIN 10 MILLIGRAM(S): 20 TABLET, FILM COATED ORAL at 21:18

## 2019-09-25 RX ADMIN — TENOFOVIR DISOPROXIL FUMARATE 300 MILLIGRAM(S): 300 TABLET, FILM COATED ORAL at 14:55

## 2019-09-25 RX ADMIN — CHLORHEXIDINE GLUCONATE 1 APPLICATION(S): 213 SOLUTION TOPICAL at 14:54

## 2019-09-25 RX ADMIN — Medication 1 APPLICATION(S): at 18:15

## 2019-09-25 RX ADMIN — HYDROXYUREA 1000 MILLIGRAM(S): 500 CAPSULE ORAL at 21:18

## 2019-09-25 RX ADMIN — Medication 1 APPLICATION(S): at 06:30

## 2019-09-25 RX ADMIN — AMLODIPINE BESYLATE 5 MILLIGRAM(S): 2.5 TABLET ORAL at 06:28

## 2019-09-25 RX ADMIN — Medication 1 APPLICATION(S): at 18:14

## 2019-09-25 RX ADMIN — Medication 1: at 08:41

## 2019-09-25 RX ADMIN — Medication 5 MILLILITER(S): at 18:12

## 2019-09-25 RX ADMIN — HYDROXYUREA 500 MILLIGRAM(S): 500 CAPSULE ORAL at 06:29

## 2019-09-25 RX ADMIN — Medication 1: at 21:17

## 2019-09-25 RX ADMIN — Medication 1: at 13:32

## 2019-09-25 RX ADMIN — Medication 100 MILLIGRAM(S): at 14:55

## 2019-09-25 RX ADMIN — Medication 1: at 18:22

## 2019-09-25 RX ADMIN — SODIUM CHLORIDE 75 MILLILITER(S): 9 INJECTION INTRAMUSCULAR; INTRAVENOUS; SUBCUTANEOUS at 06:29

## 2019-09-25 RX ADMIN — Medication 667 MILLIGRAM(S): at 08:43

## 2019-09-25 RX ADMIN — Medication 1 APPLICATION(S): at 06:29

## 2019-09-25 RX ADMIN — HYDROXYUREA 1000 MILLIGRAM(S): 500 CAPSULE ORAL at 14:52

## 2019-09-25 RX ADMIN — Medication 667 MILLIGRAM(S): at 21:17

## 2019-09-25 RX ADMIN — Medication 5 MILLILITER(S): at 08:45

## 2019-09-25 RX ADMIN — Medication 5 MILLILITER(S): at 20:34

## 2019-09-25 RX ADMIN — FINASTERIDE 5 MILLIGRAM(S): 5 TABLET, FILM COATED ORAL at 12:21

## 2019-09-25 NOTE — PROVIDER CONTACT NOTE (OTHER) - ASSESSMENT
Fever 100.4, pt not in acute distress. Pt c/o being hot and sweaty, no n/v/d or chills. No chest pain, SOB.

## 2019-09-25 NOTE — PHYSICAL THERAPY INITIAL EVALUATION ADULT - PRECAUTIONS/LIMITATIONS, REHAB EVAL
continued from above: CT C/A/P shows 4.3 x 3.4 cm mass posterior left upper lobe. 1.3 cm anterior right upper lobe nodule. These are likely neoplastic. Multiple hypodense lesions within the liver likely metastatic

## 2019-09-25 NOTE — PROGRESS NOTE ADULT - PROBLEM SELECTOR PLAN 3
Pt has leukocytosis and blasts  fu cx done on 9/23  Continue Zosyn empirically Lung cancer with liver mets  -recently (late Aug) diagnosed in [facility] by lung mass bx (no liver bx)  -pt has not received any treatment; after rediscussing on 9/23 per wife, may be difficult to obtain records  -will likely need to manage acute leukemia first before treating lung cancer  -CT A/P with contrast to assess disease extend with metastasis to the liver diagnosed by needle bx in [facility]  CT C/A/P shows 4.3 x 3.4 cm mass posterior left upper lobe. 1.3 cm anterior right   upper lobe nodule. These are likely neoplastic. Multiple hypodense lesions within the liver likely metastatic  Will likely need to manage acute leukemia before treating Lung CA with metastasis to the liver diagnosed by needle bx in [facility]  CT C/A/P shows 4.3 x 3.4 cm mass posterior left upper lobe. 1.3 cm anterior right   upper lobe nodule. These are likely neoplastic. Multiple hypodense lesions within the liver likely metastatic  Will likely need to manage acute leukemia before treating Lung CA, however will try to arrange IR to bx liver to establish origin

## 2019-09-25 NOTE — PROGRESS NOTE ADULT - PROBLEM SELECTOR PLAN 9
Dr Valero had tensive discussion with the pt/family (wife and son at bedside) about prognosis and treatment options for secondary acute leukemia as well as metastatic lung cca. Family wants to continue diagnostic testing and want to pursue treatment for hematological disorder but unsure if they want treatment for lung cancer. Poor prognosis of untreated stage IV lung ca was discussed. Continue GOC discussion. No pharmacologic ppx 2/2 port placement and worsening thrombocytopenia  Encourage ambulation      Contact info [phone]

## 2019-09-25 NOTE — CONSULT NOTE ADULT - SUBJECTIVE AND OBJECTIVE BOX
HPI:  84y man with DM2, HTN, CVA, CAD s/p stent, PVD, BPH, current heavy smoker, newly dx lung ca w/ liver mets presents to ED with progressively worsening SOB and generalized weakness. Found to have WBC of 120K being worked up for leukemia s/p bone marrow bx.      Dermatology is consulted for evaluation of pruritus and rash on the back and legs. Patient has had itching and rash x 1 year or greater. Has been taking antihistamines which have not been helping. He has been using lotion to apply on the affected areas which helps somewhat with the itching. Denies pain in the affected areas. Notes itching of the scalp as well. Denies any bleeding or drainage from the lesions. No modifying factors      Patient notes some relief from use of topical steroid.     PAST MEDICAL & SURGICAL HISTORY:  Lung cancer  High cholesterol  HTN (hypertension)  Diabetes mellitus  No significant past surgical history      REVIEW OF SYSTEMS    General: no fevers/chills, lethargy     Skin/Breast: see HPI  	  Ophthalmologic: no eye pain or change in vision  	  ENMT: no dysphagia or change in hearing    Respiratory and Thorax: SOB and Cough   	  Cardiovascular: no palpitations or chest pain    Gastrointestinal: no abdominal pain or blood in stool     Genitourinary: no dysuria or frequency    Musculoskeletal: no joint pains    Neurological: no weakness, numbness , or tingling    MEDICATIONS  (STANDING):  amLODIPine   Tablet 5 milliGRAM(s) Oral daily  AQUAPHOR (petrolatum Ointment) 1 Application(s) Topical two times a day  Biotene Dry Mouth Oral Rinse 5 milliLiter(s) Swish and Spit five times a day  bisoprolol   Tablet 5 milliGRAM(s) Oral daily  calcium acetate 667 milliGRAM(s) Oral four times a day with meals  camphor 0.5%/menthol 0.5% Topical Lotion 1 Application(s) Topical two times a day  chlorhexidine 2% Cloths 1 Application(s) Topical <User Schedule>  dextrose 5%. 1000 milliLiter(s) (50 mL/Hr) IV Continuous <Continuous>  dextrose 50% Injectable 12.5 Gram(s) IV Push once  dextrose 50% Injectable 25 Gram(s) IV Push once  dextrose 50% Injectable 25 Gram(s) IV Push once  docusate sodium 100 milliGRAM(s) Oral three times a day  finasteride 5 milliGRAM(s) Oral daily  hydroxyurea 1000 milliGRAM(s) Oral every 8 hours  influenza   Vaccine 0.5 milliLiter(s) IntraMuscular once  insulin lispro (HumaLOG) corrective regimen sliding scale   SubCutaneous three times a day before meals  insulin lispro (HumaLOG) corrective regimen sliding scale   SubCutaneous at bedtime  lidocaine 2% Injectable 1 Vial(s) Local Injection once  melatonin 5 milliGRAM(s) Oral at bedtime  nicotine - 21 mG/24Hr(s) Patch 1 patch Transdermal daily  simvastatin 10 milliGRAM(s) Oral at bedtime  sodium chloride 0.9%. 1000 milliLiter(s) (75 mL/Hr) IV Continuous <Continuous>  tamsulosin 0.4 milliGRAM(s) Oral at bedtime  tenofovir disoproxil fumarate (VIREAD) 300 milliGRAM(s) Oral <User Schedule>  triamcinolone 0.1% Ointment 1 Application(s) Topical two times a day    MEDICATIONS  (PRN):  acetaminophen   Tablet .. 650 milliGRAM(s) Oral every 6 hours PRN Temp greater or equal to 38C (100.4F), Moderate Pain (4 - 6), Severe Pain (7 - 10)  dextrose 40% Gel 15 Gram(s) Oral once PRN Blood Glucose LESS THAN 70 milliGRAM(s)/deciliter  glucagon  Injectable 1 milliGRAM(s) IntraMuscular once PRN Glucose LESS THAN 70 milligrams/deciliter  morphine  - Injectable 2 milliGRAM(s) IV Push every 4 hours PRN Moderate Pain (4 - 6)  zolpidem 5 milliGRAM(s) Oral at bedtime PRN Insomnia      Allergies    No Known Allergies    Intolerances        SOCIAL HISTORY:    FAMILY HISTORY:  No pertinent family history in first degree relatives      Vital Signs Last 24 Hrs  T(C): 37.4 (25 Sep 2019 13:30), Max: 38.1 (24 Sep 2019 17:32)  T(F): 99.3 (25 Sep 2019 13:30), Max: 100.6 (24 Sep 2019 17:32)  HR: 78 (25 Sep 2019 13:30) (78 - 90)  BP: 101/53 (25 Sep 2019 13:30) (101/53 - 132/54)  BP(mean): --  RR: 18 (25 Sep 2019 13:30) (16 - 18)  SpO2: 98% (25 Sep 2019 13:30) (94% - 99%)    PHYSICAL EXAM:     The patient was alert and oriented X 3, well nourished, and in no  apparent distress.  OP showed no ulcerations  There was no visible lymphadenopathy.  Conjunctiva were non injected  There was no clubbing or edema of extremities.  The scalp, hair, face, eyebrows, lips, OP, neck, chest, back,   extremities X 4, nails were examined.  There was no hyperhidrosis or bromhidrosis.    Of note on skin exam:   the back with multiple excoriated papules and nodules, hyperpigmented patches on the lower back, the bilateral lower legs with hyperpigmented macules     LABS:                        7.8    109.0 )-----------( 92       ( 25 Sep 2019 07:13 )             27.0     09-25    133<L>  |  96  |  30<H>  ----------------------------<  182<H>  3.7   |  18<L>  |  1.65<H>    Ca    8.7      25 Sep 2019 07:12  Phos  5.9     09-25  Mg     1.9     09-25    TPro  6.0  /  Alb  3.2<L>  /  TBili  0.8  /  DBili  x   /  AST  32  /  ALT  13  /  AlkPhos  136<H>  09-25    PT/INR - ( 25 Sep 2019 07:13 )   PT: 14.1 sec;   INR: 1.23 ratio         PTT - ( 25 Sep 2019 07:13 )  PTT:33.2 sec      RADIOLOGY & ADDITIONAL STUDIES:

## 2019-09-25 NOTE — PROGRESS NOTE ADULT - PROBLEM SELECTOR PLAN 2
Lung cancer with liver mets  -recently (late Aug) diagnosed in [facility] by lung mass bx (no liver bx)  -pt has not received any treatment; after rediscussing on 9/23 per wife, may be difficult to obtain records  -will likely need to manage acute leukemia first before treating lung cancer  -CT A/P with contrast to assess disease extend The patient is not neutriopenic, febrile  If febrile Pan Cx and CXR  Continue Levaquin The patient is not neutriopenic, febrile  If febrile Pan Cx and CXR  Continue Levaquin  Hep B Core (+) will start Viread and follow up PCR The patient is not neutropenic, febrile  If febrile Pan Cx and CXR  Continue Levaquin, if decompensates start Cefepime  Hep B Core (+) will start Viread and follow up PCR The patient is not neutropenic, febrile  If febrile Pan Cx and CXR  Continue Levaquin, if decompensates start Cefepime  Hep B Core (+) will start Viread and follow up PCR  Follow up QuantiFeron Gold

## 2019-09-25 NOTE — PROVIDER CONTACT NOTE (MEDICATION) - ASSESSMENT
Pt stable, VSS. Lung sounds clear but diminished. Pt unable to expectorate sputum. No SOB or chest pain, denies n/v/d.

## 2019-09-25 NOTE — PROGRESS NOTE ADULT - ATTENDING COMMENTS
83 yo M Mandarin-speaking, p/w leukocytosis also with lung masses ? lung CA, transferred to leukemia floor for management    -BM bx done on 9/23 -f/u results  -increase Hydrea to 1gram q8hrs given wbc still high; will check labs 2x/day  -monitor for tumor lysis syndrome -needs IVF with close monitoring for pulm edema. Allopurinol  -hx CAD and stents -on ASA 81mg as long as plt>50. Will get Echo. Repeat EKG today was normal sinus rhythm - prior EKG showed A. flutter  -hep B, C, and HIV pending  -consider Port placement prior to chemo; consider liver bx. Will get CT A/P with contrast to eval liver lesions. Family aware of both liver/lung masses as well as ?leukemia  -physical therapy eval 85 yo M Mandarin-speaking, p/w leukocytosis also with lung masses ? 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 Cont Hydrea, will try to decrease wbc count with it while awaiting further planning -pt also has a second malignancy, likely metastatic lung CA with liver mets given long hx smoking  -as planned, will increase Hydrea to 1gram q8hrs given wbc still high; will check labs 2x/day  -monitor for tumor lysis syndrome -needs IVF with close monitoring for pulm edema. Allopurinol  -hx CAD and stents -on ASA 81mg as long as plt>50. Echo -EF 65%  -hep B -core Ab +, will check PCR and start Virea. Hep C, and HIV nonreactive  -awaiting Port placement prior to chemo and liver bx. CT A/P done -  -physical therapy eval

## 2019-09-25 NOTE — CONSULT NOTE ADULT - REASON FOR ADMISSION
84M w/ generalized weakness and SOB
84M w/ generalized weakness and SOB
weakness and SOB
generalized weakness and SOB

## 2019-09-25 NOTE — PROGRESS NOTE ADULT - SUBJECTIVE AND OBJECTIVE BOX
Diagnosis:    Protocol/Chemo Regimen:    Day:     Pt endorsed:    Review of Systems:     Pain scale:     Diet:     Allergies    No Known Allergies    Intolerances        ANTIMICROBIALS  levoFLOXacin  Tablet 500 milliGRAM(s) Oral every 24 hours      HEME/ONC MEDICATIONS  hydroxyurea 500 milliGRAM(s) Oral every 8 hours      STANDING MEDICATIONS  allopurinol 300 milliGRAM(s) Oral daily  amLODIPine   Tablet 5 milliGRAM(s) Oral daily  AQUAPHOR (petrolatum Ointment) 1 Application(s) Topical two times a day  Biotene Dry Mouth Oral Rinse 5 milliLiter(s) Swish and Spit five times a day  bisoprolol   Tablet 5 milliGRAM(s) Oral daily  calcium acetate 667 milliGRAM(s) Oral four times a day with meals  camphor 0.5%/menthol 0.5% Topical Lotion 1 Application(s) Topical two times a day  dextrose 5%. 1000 milliLiter(s) IV Continuous <Continuous>  dextrose 50% Injectable 12.5 Gram(s) IV Push once  dextrose 50% Injectable 25 Gram(s) IV Push once  dextrose 50% Injectable 25 Gram(s) IV Push once  docusate sodium 100 milliGRAM(s) Oral three times a day  finasteride 5 milliGRAM(s) Oral daily  influenza   Vaccine 0.5 milliLiter(s) IntraMuscular once  insulin lispro (HumaLOG) corrective regimen sliding scale   SubCutaneous three times a day before meals  insulin lispro (HumaLOG) corrective regimen sliding scale   SubCutaneous at bedtime  lidocaine 2% Injectable 1 Vial(s) Local Injection once  melatonin 5 milliGRAM(s) Oral at bedtime  nicotine - 21 mG/24Hr(s) Patch 1 patch Transdermal daily  simvastatin 10 milliGRAM(s) Oral at bedtime  sodium chloride 0.9%. 1000 milliLiter(s) IV Continuous <Continuous>  tamsulosin 0.4 milliGRAM(s) Oral at bedtime  triamcinolone 0.1% Ointment 1 Application(s) Topical two times a day      PRN MEDICATIONS  acetaminophen   Tablet .. 650 milliGRAM(s) Oral every 6 hours PRN  dextrose 40% Gel 15 Gram(s) Oral once PRN  glucagon  Injectable 1 milliGRAM(s) IntraMuscular once PRN  morphine  - Injectable 2 milliGRAM(s) IV Push every 4 hours PRN  zolpidem 5 milliGRAM(s) Oral at bedtime PRN        Vital Signs Last 24 Hrs  T(C): 37.6 (25 Sep 2019 05:09), Max: 38.1 (24 Sep 2019 17:32)  T(F): 99.6 (25 Sep 2019 05:09), Max: 100.6 (24 Sep 2019 17:32)  HR: 88 (25 Sep 2019 05:09) (79 - 99)  BP: 114/51 (25 Sep 2019 05:09) (107/55 - 132/54)  BP(mean): --  RR: 18 (25 Sep 2019 05:09) (16 - 18)  SpO2: 94% (25 Sep 2019 05:09) (94% - 98%)    PHYSICAL EXAM  General: NAD  HEENT: PERRLA, EOMI, clear oropharynx  Neck: supple  CV: (+) S1/S2 RRR  Lungs: clear to auscultation, no wheezes or rales  Abdomen: soft, non-tender, non-distended (+) BS  Ext: no edema  Skin: no rashes   Neuro: alert and oriented X 3, no focal deficits  Central Line:     RECENT CULTURES:  09-23 @ 09:55  .Urine  --  --  --    No growth  --  09-23 @ 01:11  .Blood  --  --  --    No growth to date.  --        LABS:                        7.5    113.0 )-----------( 89       ( 24 Sep 2019 13:49 )             26.1         Mean Cell Volume : 94.2 fl  Mean Cell Hemoglobin : 26.9 pg  Mean Cell Hemoglobin Concentration : 28.6 gm/dL  Auto Neutrophil # : See Note  Auto Lymphocyte # : See Note  Auto Monocyte # : See Note  Auto Eosinophil # : See Note  Auto Basophil # : See Note  Auto Neutrophil % : 25.0 %  Auto Lymphocyte % : 4.0 %  Auto Monocyte % : 35.0 %  Auto Eosinophil % : 1.0 %  Auto Basophil % : x      09-24    132<L>  |  96  |  28<H>  ----------------------------<  225<H>  3.6   |  19<L>  |  1.68<H>    Ca    8.4      24 Sep 2019 23:36  Phos  6.1     09-24  Mg     1.9     09-24    TPro  5.5<L>  /  Alb  3.0<L>  /  TBili  0.7  /  DBili  x   /  AST  31  /  ALT  11  /  AlkPhos  125<H>  09-24      Mg 1.9  Phos 6.1  Mg 1.9  Phos 6.4      PT/INR - ( 24 Sep 2019 13:49 )   PT: 14.6 sec;   INR: 1.26 ratio         PTT - ( 24 Sep 2019 07:40 )  PTT:42.9 sec    LDH 2008  Uric Acid 3.9    LDH 2161  Uric Acid 5.1        RADIOLOGY & ADDITIONAL STUDIES: Diagnosis: Rule Out Acute leukemia    Protocol/Chemo Regimen: TBD    Day: N/A     Pt endorsed: headache (chronic), rash to back and abdominal pain    Review of Systems: Patient denies headache, dizziness, visual changes, chest pain, palpitations, SOB, abdominal pain, nausea, vomiting, diarrhea or dysuria.    Pain scale:                                    Diet: Consistent Carb    Allergies: No Known Allergies      ANTIMICROBIALS  levoFLOXacin  Tablet 500 milliGRAM(s) Oral every 24 hours      HEME/ONC MEDICATIONS  hydroxyurea 500 milliGRAM(s) Oral every 8 hours      STANDING MEDICATIONS  allopurinol 300 milliGRAM(s) Oral daily  amLODIPine   Tablet 5 milliGRAM(s) Oral daily  AQUAPHOR (petrolatum Ointment) 1 Application(s) Topical two times a day  Biotene Dry Mouth Oral Rinse 5 milliLiter(s) Swish and Spit five times a day  bisoprolol   Tablet 5 milliGRAM(s) Oral daily  calcium acetate 667 milliGRAM(s) Oral four times a day with meals  camphor 0.5%/menthol 0.5% Topical Lotion 1 Application(s) Topical two times a day  dextrose 5%. 1000 milliLiter(s) IV Continuous <Continuous>  dextrose 50% Injectable 12.5 Gram(s) IV Push once  dextrose 50% Injectable 25 Gram(s) IV Push once  dextrose 50% Injectable 25 Gram(s) IV Push once  docusate sodium 100 milliGRAM(s) Oral three times a day  finasteride 5 milliGRAM(s) Oral daily  influenza   Vaccine 0.5 milliLiter(s) IntraMuscular once  insulin lispro (HumaLOG) corrective regimen sliding scale   SubCutaneous three times a day before meals  insulin lispro (HumaLOG) corrective regimen sliding scale   SubCutaneous at bedtime  lidocaine 2% Injectable 1 Vial(s) Local Injection once  melatonin 5 milliGRAM(s) Oral at bedtime  nicotine - 21 mG/24Hr(s) Patch 1 patch Transdermal daily  simvastatin 10 milliGRAM(s) Oral at bedtime  sodium chloride 0.9%. 1000 milliLiter(s) IV Continuous <Continuous>  tamsulosin 0.4 milliGRAM(s) Oral at bedtime  triamcinolone 0.1% Ointment 1 Application(s) Topical two times a day      PRN MEDICATIONS  acetaminophen   Tablet .. 650 milliGRAM(s) Oral every 6 hours PRN  dextrose 40% Gel 15 Gram(s) Oral once PRN  glucagon  Injectable 1 milliGRAM(s) IntraMuscular once PRN  morphine  - Injectable 2 milliGRAM(s) IV Push every 4 hours PRN  zolpidem 5 milliGRAM(s) Oral at bedtime PRN      Vital Signs Last 24 Hrs  T(C): 37.6 (25 Sep 2019 05:09), Max: 38.1 (24 Sep 2019 17:32)  T(F): 99.6 (25 Sep 2019 05:09), Max: 100.6 (24 Sep 2019 17:32)  HR: 88 (25 Sep 2019 05:09) (79 - 99)  BP: 114/51 (25 Sep 2019 05:09) (107/55 - 132/54)  BP(mean): --  RR: 18 (25 Sep 2019 05:09) (16 - 18)  SpO2: 94% (25 Sep 2019 05:09) (94% - 98%)      PHYSICAL EXAM  General: NAD  HEENT: PERRLA, EOMI, clear oropharynx  Neck: supple  CV: (+) S1/S2 RRR  Lungs: clear to auscultation, no wheezes or rales  Abdomen: soft, mildly distended with some pain to palpation (+) BS  Ext: no edema  Skin: hyperpigmented rash to upper back and feet  Neuro: alert and oriented X 3, no focal deficits  PIV: C/D/I       RECENT CULTURES:  09-23 @ 09:55  .Urine  --  --  --    No growth  --  09-23 @ 01:11  .Blood  --  --  --    No growth to date.  --        LABS:                        7.5    113.0 )-----------( 89       ( 24 Sep 2019 13:49 )             26.1         Mean Cell Volume : 94.2 fl  Mean Cell Hemoglobin : 26.9 pg  Mean Cell Hemoglobin Concentration : 28.6 gm/dL  Auto Neutrophil # : See Note  Auto Lymphocyte # : See Note  Auto Monocyte # : See Note  Auto Eosinophil # : See Note  Auto Basophil # : See Note  Auto Neutrophil % : 25.0 %  Auto Lymphocyte % : 4.0 %  Auto Monocyte % : 35.0 %  Auto Eosinophil % : 1.0 %  Auto Basophil % : x      09-24    132<L>  |  96  |  28<H>  ----------------------------<  225<H>  3.6   |  19<L>  |  1.68<H>    Ca    8.4      24 Sep 2019 23:36  Phos  6.1     09-24  Mg     1.9     09-24    TPro  5.5<L>  /  Alb  3.0<L>  /  TBili  0.7  /  DBili  x   /  AST  31  /  ALT  11  /  AlkPhos  125<H>  09-24      Mg 1.9  Phos 6.1  Mg 1.9  Phos 6.4      PT/INR - ( 24 Sep 2019 13:49 )   PT: 14.6 sec;   INR: 1.26 ratio         PTT - ( 24 Sep 2019 07:40 )  PTT:42.9 sec    LDH 2008  Uric Acid 3.9    LDH 2161  Uric Acid 5.1        RADIOLOGY & ADDITIONAL STUDIES: Diagnosis: Rule Out Acute leukemia    Protocol/Chemo Regimen: TBD    Day: N/A     Pt endorsed: headache (chronic), rash to back and abdominal pain    Review of Systems: Patient denies headache, dizziness, visual changes, chest pain, palpitations, SOB, abdominal pain, nausea, vomiting, diarrhea or dysuria.    Pain scale:                                    Diet: Consistent Carb    Allergies: No Known Allergies      ANTIMICROBIALS  levoFLOXacin  Tablet 500 milliGRAM(s) Oral every 24 hours      HEME/ONC MEDICATIONS  hydroxyurea 500 milliGRAM(s) Oral every 8 hours      STANDING MEDICATIONS  allopurinol 300 milliGRAM(s) Oral daily  amLODIPine   Tablet 5 milliGRAM(s) Oral daily  AQUAPHOR (petrolatum Ointment) 1 Application(s) Topical two times a day  Biotene Dry Mouth Oral Rinse 5 milliLiter(s) Swish and Spit five times a day  bisoprolol   Tablet 5 milliGRAM(s) Oral daily  calcium acetate 667 milliGRAM(s) Oral four times a day with meals  camphor 0.5%/menthol 0.5% Topical Lotion 1 Application(s) Topical two times a day  dextrose 5%. 1000 milliLiter(s) IV Continuous <Continuous>  dextrose 50% Injectable 12.5 Gram(s) IV Push once  dextrose 50% Injectable 25 Gram(s) IV Push once  dextrose 50% Injectable 25 Gram(s) IV Push once  docusate sodium 100 milliGRAM(s) Oral three times a day  finasteride 5 milliGRAM(s) Oral daily  influenza   Vaccine 0.5 milliLiter(s) IntraMuscular once  insulin lispro (HumaLOG) corrective regimen sliding scale   SubCutaneous three times a day before meals  insulin lispro (HumaLOG) corrective regimen sliding scale   SubCutaneous at bedtime  lidocaine 2% Injectable 1 Vial(s) Local Injection once  melatonin 5 milliGRAM(s) Oral at bedtime  nicotine - 21 mG/24Hr(s) Patch 1 patch Transdermal daily  simvastatin 10 milliGRAM(s) Oral at bedtime  sodium chloride 0.9%. 1000 milliLiter(s) IV Continuous <Continuous>  tamsulosin 0.4 milliGRAM(s) Oral at bedtime  triamcinolone 0.1% Ointment 1 Application(s) Topical two times a day      PRN MEDICATIONS  acetaminophen   Tablet .. 650 milliGRAM(s) Oral every 6 hours PRN  dextrose 40% Gel 15 Gram(s) Oral once PRN  glucagon  Injectable 1 milliGRAM(s) IntraMuscular once PRN  morphine  - Injectable 2 milliGRAM(s) IV Push every 4 hours PRN  zolpidem 5 milliGRAM(s) Oral at bedtime PRN      Vital Signs Last 24 Hrs  T(C): 37.6 (25 Sep 2019 05:09), Max: 38.1 (24 Sep 2019 17:32)  T(F): 99.6 (25 Sep 2019 05:09), Max: 100.6 (24 Sep 2019 17:32)  HR: 88 (25 Sep 2019 05:09) (79 - 99)  BP: 114/51 (25 Sep 2019 05:09) (107/55 - 132/54)  BP(mean): --  RR: 18 (25 Sep 2019 05:09) (16 - 18)  SpO2: 94% (25 Sep 2019 05:09) (94% - 98%)      PHYSICAL EXAM  General: NAD  HEENT: PERRLA, EOMI, clear oropharynx  Neck: supple  CV: (+) S1/S2 RRR  Lungs: clear to auscultation, no wheezes or rales  Abdomen: soft, mildly distended with some pain to palpation (+) BS  Ext: no edema  Skin: hyperpigmented rash to upper back and feet  Neuro: alert and oriented X 3, no focal deficits  PIV: C/D/I         LABS:                        7.8    109.0 )-----------( 92       ( 25 Sep 2019 07:13 )             27.0         Mean Cell Volume : 93.1 fl  Mean Cell Hemoglobin : 27.0 pg  Mean Cell Hemoglobin Concentration : 29.0 gm/dL  Auto Neutrophil # : See Note  Auto Lymphocyte # : 5.7 K/uL  Auto Monocyte # : 27.8 K/uL  Auto Eosinophil # : 1.4 K/uL  Auto Basophil # : 0.1 K/uL  Auto Neutrophil % : 48.0 %  Auto Lymphocyte % : 3.0 %  Auto Monocyte % : 31.0 %  Auto Eosinophil % : 1.0 %  Auto Basophil % : 0.0 %      09-25    133<L>  |  96  |  30<H>  ----------------------------<  182<H>  3.7   |  18<L>  |  1.65<H>    Ca    8.7      25 Sep 2019 07:12  Phos  5.9     09-25  Mg     1.9     09-25    TPro  6.0  /  Alb  3.2<L>  /  TBili  0.8  /  DBili  x   /  AST  32  /  ALT  13  /  AlkPhos  136<H>  09-25      Mg 1.9  Phos 5.9      PT/INR - ( 25 Sep 2019 07:13 )   PT: 14.1 sec;   INR: 1.23 ratio         PTT - ( 25 Sep 2019 07:13 )  PTT:33.2 sec    LDH 2102  Uric Acid 4.0        RECENT CULTURES:    Culture - Urine (09.23.19 @ 09:55)    Specimen Source: .Urine    Culture Results:   No growth    Culture - Blood (09.23.19 @ 01:11)    Specimen Source: .Blood    Culture Results:   No growth to date.        RADIOLOGY & ADDITIONAL STUDIES:    EXAM:  CT ABDOMEN AND PELVIS OC IC                        PROCEDURE DATE:  09/24/2019    IMPRESSION: Hepatomegaly, with bilobar hypodense lesions, suspicious for metastatic   disease.Splenomegaly, with multiple splenic infarcts. Retroperitoneal lymphadenopathy.  Enlarged prostate gland. A 4.8 cm infrarenal abdominal aortic aneurysm, status post bifurcated   aortic stent graft. Diagnosis: Rule Out Acute leukemia    Protocol/Chemo Regimen: TBD    Day: N/A     Pt endorsed: headache (chronic), rash to back and abdominal pain    Review of Systems: Patient denies headache, dizziness, visual changes, chest pain, palpitations, SOB, abdominal pain, nausea, vomiting, diarrhea or dysuria.    Pain scale:                                    Diet: Consistent Carb    Allergies: No Known Allergies      ANTIMICROBIALS  levoFLOXacin  Tablet 500 milliGRAM(s) Oral every 24 hours      HEME/ONC MEDICATIONS  hydroxyurea 500 milliGRAM(s) Oral every 8 hours      STANDING MEDICATIONS  allopurinol 300 milliGRAM(s) Oral daily  amLODIPine   Tablet 5 milliGRAM(s) Oral daily  AQUAPHOR (petrolatum Ointment) 1 Application(s) Topical two times a day  Biotene Dry Mouth Oral Rinse 5 milliLiter(s) Swish and Spit five times a day  bisoprolol   Tablet 5 milliGRAM(s) Oral daily  calcium acetate 667 milliGRAM(s) Oral four times a day with meals  camphor 0.5%/menthol 0.5% Topical Lotion 1 Application(s) Topical two times a day  dextrose 5%. 1000 milliLiter(s) IV Continuous <Continuous>  dextrose 50% Injectable 12.5 Gram(s) IV Push once  dextrose 50% Injectable 25 Gram(s) IV Push once  dextrose 50% Injectable 25 Gram(s) IV Push once  docusate sodium 100 milliGRAM(s) Oral three times a day  finasteride 5 milliGRAM(s) Oral daily  influenza   Vaccine 0.5 milliLiter(s) IntraMuscular once  insulin lispro (HumaLOG) corrective regimen sliding scale   SubCutaneous three times a day before meals  insulin lispro (HumaLOG) corrective regimen sliding scale   SubCutaneous at bedtime  lidocaine 2% Injectable 1 Vial(s) Local Injection once  melatonin 5 milliGRAM(s) Oral at bedtime  nicotine - 21 mG/24Hr(s) Patch 1 patch Transdermal daily  simvastatin 10 milliGRAM(s) Oral at bedtime  sodium chloride 0.9%. 1000 milliLiter(s) IV Continuous <Continuous>  tamsulosin 0.4 milliGRAM(s) Oral at bedtime  triamcinolone 0.1% Ointment 1 Application(s) Topical two times a day      PRN MEDICATIONS  acetaminophen   Tablet .. 650 milliGRAM(s) Oral every 6 hours PRN  dextrose 40% Gel 15 Gram(s) Oral once PRN  glucagon  Injectable 1 milliGRAM(s) IntraMuscular once PRN  morphine  - Injectable 2 milliGRAM(s) IV Push every 4 hours PRN  zolpidem 5 milliGRAM(s) Oral at bedtime PRN      Vital Signs Last 24 Hrs  T(C): 37.6 (25 Sep 2019 05:09), Max: 38.1 (24 Sep 2019 17:32)  T(F): 99.6 (25 Sep 2019 05:09), Max: 100.6 (24 Sep 2019 17:32)  HR: 88 (25 Sep 2019 05:09) (79 - 99)  BP: 114/51 (25 Sep 2019 05:09) (107/55 - 132/54)  BP(mean): --  RR: 18 (25 Sep 2019 05:09) (16 - 18)  SpO2: 94% (25 Sep 2019 05:09) (94% - 98%)      PHYSICAL EXAM  General: NAD  HEENT: PERRLA, EOMI, clear oropharynx  Neck: supple  CV: (+) S1/S2 RRR  Lungs: clear to auscultation, no wheezes or rales  Abdomen: soft, mildly distended with some pain to palpation (+) BS  Ext: no edema  Skin: hyperpigmented rash to upper back and feet  Neuro: alert and oriented X 3, no focal deficits  PIV: C/D/I         LABS:                        7.8    109.0 )-----------( 92       ( 25 Sep 2019 07:13 )             27.0         Mean Cell Volume : 93.1 fl  Mean Cell Hemoglobin : 27.0 pg  Mean Cell Hemoglobin Concentration : 29.0 gm/dL  Auto Neutrophil # : See Note  Auto Lymphocyte # : 5.7 K/uL  Auto Monocyte # : 27.8 K/uL  Auto Eosinophil # : 1.4 K/uL  Auto Basophil # : 0.1 K/uL  Auto Neutrophil % : 48.0 %  Auto Lymphocyte % : 3.0 %  Auto Monocyte % : 31.0 %  Auto Eosinophil % : 1.0 %  Auto Basophil % : 0.0 %      09-25    133<L>  |  96  |  30<H>  ----------------------------<  182<H>  3.7   |  18<L>  |  1.65<H>    Ca    8.7      25 Sep 2019 07:12  Phos  5.9     09-25  Mg     1.9     09-25    TPro  6.0  /  Alb  3.2<L>  /  TBili  0.8  /  DBili  x   /  AST  32  /  ALT  13  /  AlkPhos  136<H>  09-25      Mg 1.9  Phos 5.9      PT/INR - ( 25 Sep 2019 07:13 )   PT: 14.1 sec;   INR: 1.23 ratio         PTT - ( 25 Sep 2019 07:13 )  PTT:33.2 sec    LDH 2102  Uric Acid 4.0        RECENT CULTURES:    Culture - Urine (09.23.19 @ 09:55)    Specimen Source: .Urine    Culture Results:   No growth    Culture - Blood (09.23.19 @ 01:11)    Specimen Source: .Blood    Culture Results:   No growth to date.        RADIOLOGY & ADDITIONAL STUDIES:    EXAM:  CT ABDOMEN AND PELVIS OC IC                        PROCEDURE DATE:  09/24/2019    IMPRESSION: Hepatomegaly, with bilobar hypodense lesions, suspicious for metastatic   disease. Splenomegaly, with multiple splenic infarcts. Retroperitoneal lymphadenopathy.  Enlarged prostate gland. A 4.8 cm infrarenal abdominal aortic aneurysm, status post bifurcated   aortic stent graft.            EXAM:  CT ANGIO CHEST (W)AW IC                        PROCEDURE DATE:  09/23/2019    IMPRESSION: No central pulmonary embolus. The subsegmental branches are not   evaluated secondary to respiratory motion. 4.3 x 3.4 cm mass posterior left upper lobe. 1.3 cm anterior right   upper lobe nodule. These are likely neoplastic. Multiple hypodense lesions within the liver likely metastatic. Splenomegaly.  Peripheral hypodensities within the spleen may   represent infarct or metastatic lesions or combination of both. Diagnosis: Acute Leukemia    Protocol/Chemo Regimen: TBD    Day: N/A     Pt endorsed: headache (chronic), rash to back and abdominal pain    Review of Systems: Patient denies headache, dizziness, visual changes, chest pain, palpitations, SOB, abdominal pain, nausea, vomiting, diarrhea or dysuria.    Pain scale:                                    Diet: Consistent Carb    Allergies: No Known Allergies      ANTIMICROBIALS  levoFLOXacin  Tablet 500 milliGRAM(s) Oral every 24 hours      HEME/ONC MEDICATIONS  hydroxyurea 500 milliGRAM(s) Oral every 8 hours      STANDING MEDICATIONS  allopurinol 300 milliGRAM(s) Oral daily  amLODIPine   Tablet 5 milliGRAM(s) Oral daily  AQUAPHOR (petrolatum Ointment) 1 Application(s) Topical two times a day  Biotene Dry Mouth Oral Rinse 5 milliLiter(s) Swish and Spit five times a day  bisoprolol   Tablet 5 milliGRAM(s) Oral daily  calcium acetate 667 milliGRAM(s) Oral four times a day with meals  camphor 0.5%/menthol 0.5% Topical Lotion 1 Application(s) Topical two times a day  dextrose 5%. 1000 milliLiter(s) IV Continuous <Continuous>  dextrose 50% Injectable 12.5 Gram(s) IV Push once  dextrose 50% Injectable 25 Gram(s) IV Push once  dextrose 50% Injectable 25 Gram(s) IV Push once  docusate sodium 100 milliGRAM(s) Oral three times a day  finasteride 5 milliGRAM(s) Oral daily  influenza   Vaccine 0.5 milliLiter(s) IntraMuscular once  insulin lispro (HumaLOG) corrective regimen sliding scale   SubCutaneous three times a day before meals  insulin lispro (HumaLOG) corrective regimen sliding scale   SubCutaneous at bedtime  lidocaine 2% Injectable 1 Vial(s) Local Injection once  melatonin 5 milliGRAM(s) Oral at bedtime  nicotine - 21 mG/24Hr(s) Patch 1 patch Transdermal daily  simvastatin 10 milliGRAM(s) Oral at bedtime  sodium chloride 0.9%. 1000 milliLiter(s) IV Continuous <Continuous>  tamsulosin 0.4 milliGRAM(s) Oral at bedtime  triamcinolone 0.1% Ointment 1 Application(s) Topical two times a day      PRN MEDICATIONS  acetaminophen   Tablet .. 650 milliGRAM(s) Oral every 6 hours PRN  dextrose 40% Gel 15 Gram(s) Oral once PRN  glucagon  Injectable 1 milliGRAM(s) IntraMuscular once PRN  morphine  - Injectable 2 milliGRAM(s) IV Push every 4 hours PRN  zolpidem 5 milliGRAM(s) Oral at bedtime PRN      Vital Signs Last 24 Hrs  T(C): 37.6 (25 Sep 2019 05:09), Max: 38.1 (24 Sep 2019 17:32)  T(F): 99.6 (25 Sep 2019 05:09), Max: 100.6 (24 Sep 2019 17:32)  HR: 88 (25 Sep 2019 05:09) (79 - 99)  BP: 114/51 (25 Sep 2019 05:09) (107/55 - 132/54)  BP(mean): --  RR: 18 (25 Sep 2019 05:09) (16 - 18)  SpO2: 94% (25 Sep 2019 05:09) (94% - 98%)      PHYSICAL EXAM  General: NAD  HEENT: PERRLA, EOMI, clear oropharynx  Neck: supple  CV: (+) S1/S2 RRR  Lungs: clear to auscultation, no wheezes or rales  Abdomen: soft, mildly distended with some pain to palpation (+) BS  Ext: no edema  Skin: hyperpigmented rash to upper back and feet  Neuro: alert and oriented X 3, no focal deficits  PIV: C/D/I         LABS:                        7.8    109.0 )-----------( 92       ( 25 Sep 2019 07:13 )             27.0         Mean Cell Volume : 93.1 fl  Mean Cell Hemoglobin : 27.0 pg  Mean Cell Hemoglobin Concentration : 29.0 gm/dL  Auto Neutrophil # : See Note  Auto Lymphocyte # : 5.7 K/uL  Auto Monocyte # : 27.8 K/uL  Auto Eosinophil # : 1.4 K/uL  Auto Basophil # : 0.1 K/uL  Auto Neutrophil % : 48.0 %  Auto Lymphocyte % : 3.0 %  Auto Monocyte % : 31.0 %  Auto Eosinophil % : 1.0 %  Auto Basophil % : 0.0 %      09-25    133<L>  |  96  |  30<H>  ----------------------------<  182<H>  3.7   |  18<L>  |  1.65<H>    Ca    8.7      25 Sep 2019 07:12  Phos  5.9     09-25  Mg     1.9     09-25    TPro  6.0  /  Alb  3.2<L>  /  TBili  0.8  /  DBili  x   /  AST  32  /  ALT  13  /  AlkPhos  136<H>  09-25      Mg 1.9  Phos 5.9      PT/INR - ( 25 Sep 2019 07:13 )   PT: 14.1 sec;   INR: 1.23 ratio         PTT - ( 25 Sep 2019 07:13 )  PTT:33.2 sec    LDH 2102  Uric Acid 4.0        RECENT CULTURES:    Culture - Urine (09.23.19 @ 09:55)    Specimen Source: .Urine    Culture Results:   No growth    Culture - Blood (09.23.19 @ 01:11)    Specimen Source: .Blood    Culture Results:   No growth to date.        RADIOLOGY & ADDITIONAL STUDIES:    EXAM:  CT ABDOMEN AND PELVIS OC IC                        PROCEDURE DATE:  09/24/2019    IMPRESSION: Hepatomegaly, with bilobar hypodense lesions, suspicious for metastatic   disease. Splenomegaly, with multiple splenic infarcts. Retroperitoneal lymphadenopathy.  Enlarged prostate gland. A 4.8 cm infrarenal abdominal aortic aneurysm, status post bifurcated   aortic stent graft.            EXAM:  CT ANGIO CHEST (W)AW IC                        PROCEDURE DATE:  09/23/2019    IMPRESSION: No central pulmonary embolus. The subsegmental branches are not   evaluated secondary to respiratory motion. 4.3 x 3.4 cm mass posterior left upper lobe. 1.3 cm anterior right   upper lobe nodule. These are likely neoplastic. Multiple hypodense lesions within the liver likely metastatic. Splenomegaly.  Peripheral hypodensities within the spleen may   represent infarct or metastatic lesions or combination of both. Diagnosis: Acute Leukemia    Protocol/Chemo Regimen: TBD    Day: N/A     Pt endorsed: rash to back and abdominal pain both have improved since yesterday    Review of Systems: Patient denies headache, dizziness, visual changes, chest pain, palpitations, SOB, abdominal pain, nausea, vomiting, diarrhea or dysuria.    Pain scale:                                    Diet: Consistent Carb    Allergies: No Known Allergies      ANTIMICROBIALS  levoFLOXacin  Tablet 500 milliGRAM(s) Oral every 24 hours      HEME/ONC MEDICATIONS  hydroxyurea 500 milliGRAM(s) Oral every 8 hours      STANDING MEDICATIONS  allopurinol 300 milliGRAM(s) Oral daily  amLODIPine   Tablet 5 milliGRAM(s) Oral daily  AQUAPHOR (petrolatum Ointment) 1 Application(s) Topical two times a day  Biotene Dry Mouth Oral Rinse 5 milliLiter(s) Swish and Spit five times a day  bisoprolol   Tablet 5 milliGRAM(s) Oral daily  calcium acetate 667 milliGRAM(s) Oral four times a day with meals  camphor 0.5%/menthol 0.5% Topical Lotion 1 Application(s) Topical two times a day  dextrose 5%. 1000 milliLiter(s) IV Continuous <Continuous>  dextrose 50% Injectable 12.5 Gram(s) IV Push once  dextrose 50% Injectable 25 Gram(s) IV Push once  dextrose 50% Injectable 25 Gram(s) IV Push once  docusate sodium 100 milliGRAM(s) Oral three times a day  finasteride 5 milliGRAM(s) Oral daily  influenza   Vaccine 0.5 milliLiter(s) IntraMuscular once  insulin lispro (HumaLOG) corrective regimen sliding scale   SubCutaneous three times a day before meals  insulin lispro (HumaLOG) corrective regimen sliding scale   SubCutaneous at bedtime  lidocaine 2% Injectable 1 Vial(s) Local Injection once  melatonin 5 milliGRAM(s) Oral at bedtime  nicotine - 21 mG/24Hr(s) Patch 1 patch Transdermal daily  simvastatin 10 milliGRAM(s) Oral at bedtime  sodium chloride 0.9%. 1000 milliLiter(s) IV Continuous <Continuous>  tamsulosin 0.4 milliGRAM(s) Oral at bedtime  triamcinolone 0.1% Ointment 1 Application(s) Topical two times a day      PRN MEDICATIONS  acetaminophen   Tablet .. 650 milliGRAM(s) Oral every 6 hours PRN  dextrose 40% Gel 15 Gram(s) Oral once PRN  glucagon  Injectable 1 milliGRAM(s) IntraMuscular once PRN  morphine  - Injectable 2 milliGRAM(s) IV Push every 4 hours PRN  zolpidem 5 milliGRAM(s) Oral at bedtime PRN      Vital Signs Last 24 Hrs  T(C): 37.6 (25 Sep 2019 05:09), Max: 38.1 (24 Sep 2019 17:32)  T(F): 99.6 (25 Sep 2019 05:09), Max: 100.6 (24 Sep 2019 17:32)  HR: 88 (25 Sep 2019 05:09) (79 - 99)  BP: 114/51 (25 Sep 2019 05:09) (107/55 - 132/54)  BP(mean): --  RR: 18 (25 Sep 2019 05:09) (16 - 18)  SpO2: 94% (25 Sep 2019 05:09) (94% - 98%)      PHYSICAL EXAM  General: NAD  HEENT: PERRLA, EOMI, clear oropharynx  Neck: supple  CV: (+) S1/S2 RRR  Lungs: clear to auscultation, no wheezes or rales  Abdomen: soft, mildly distended with some pain to palpation (+) BS  Ext: no edema  Skin: hyperpigmented rash to back and areas of petechia scattered  Neuro: alert and oriented X 3, no focal deficits  PIV: C/D/I         LABS:                        7.8    109.0 )-----------( 92       ( 25 Sep 2019 07:13 )             27.0         Mean Cell Volume : 93.1 fl  Mean Cell Hemoglobin : 27.0 pg  Mean Cell Hemoglobin Concentration : 29.0 gm/dL  Auto Neutrophil # : See Note  Auto Lymphocyte # : 5.7 K/uL  Auto Monocyte # : 27.8 K/uL  Auto Eosinophil # : 1.4 K/uL  Auto Basophil # : 0.1 K/uL  Auto Neutrophil % : 48.0 %  Auto Lymphocyte % : 3.0 %  Auto Monocyte % : 31.0 %  Auto Eosinophil % : 1.0 %  Auto Basophil % : 0.0 %      09-25    133<L>  |  96  |  30<H>  ----------------------------<  182<H>  3.7   |  18<L>  |  1.65<H>    Ca    8.7      25 Sep 2019 07:12  Phos  5.9     09-25  Mg     1.9     09-25    TPro  6.0  /  Alb  3.2<L>  /  TBili  0.8  /  DBili  x   /  AST  32  /  ALT  13  /  AlkPhos  136<H>  09-25      Mg 1.9  Phos 5.9      PT/INR - ( 25 Sep 2019 07:13 )   PT: 14.1 sec;   INR: 1.23 ratio         PTT - ( 25 Sep 2019 07:13 )  PTT:33.2 sec    LDH 2102  Uric Acid 4.0        RECENT CULTURES:    Culture - Urine (09.23.19 @ 09:55)    Specimen Source: .Urine    Culture Results:   No growth    Culture - Blood (09.23.19 @ 01:11)    Specimen Source: .Blood    Culture Results:   No growth to date.        RADIOLOGY & ADDITIONAL STUDIES:    EXAM:  CT ABDOMEN AND PELVIS OC IC                        PROCEDURE DATE:  09/24/2019    IMPRESSION: Hepatomegaly, with bilobar hypodense lesions, suspicious for metastatic   disease. Splenomegaly, with multiple splenic infarcts. Retroperitoneal lymphadenopathy.  Enlarged prostate gland. A 4.8 cm infrarenal abdominal aortic aneurysm, status post bifurcated   aortic stent graft.            EXAM:  CT ANGIO CHEST (W)AW IC                        PROCEDURE DATE:  09/23/2019    IMPRESSION: No central pulmonary embolus. The subsegmental branches are not   evaluated secondary to respiratory motion. 4.3 x 3.4 cm mass posterior left upper lobe. 1.3 cm anterior right   upper lobe nodule. These are likely neoplastic. Multiple hypodense lesions within the liver likely metastatic. Splenomegaly.  Peripheral hypodensities within the spleen may   represent infarct or metastatic lesions or combination of both. Diagnosis: Acute Leukemia    Protocol/Chemo Regimen: TBD    Day: N/A    ID: [id number]    Pt endorsed: rash to back and abdominal pain both have improved since yesterday    Review of Systems: Patient denies headache, dizziness, visual changes, chest pain, palpitations, SOB, abdominal pain, nausea, vomiting, diarrhea or dysuria.    Pain scale:                                    Diet: Consistent Carb    Allergies: No Known Allergies      ANTIMICROBIALS  levoFLOXacin  Tablet 500 milliGRAM(s) Oral every 24 hours      HEME/ONC MEDICATIONS  hydroxyurea 500 milliGRAM(s) Oral every 8 hours      STANDING MEDICATIONS  allopurinol 300 milliGRAM(s) Oral daily  amLODIPine   Tablet 5 milliGRAM(s) Oral daily  AQUAPHOR (petrolatum Ointment) 1 Application(s) Topical two times a day  Biotene Dry Mouth Oral Rinse 5 milliLiter(s) Swish and Spit five times a day  bisoprolol   Tablet 5 milliGRAM(s) Oral daily  calcium acetate 667 milliGRAM(s) Oral four times a day with meals  camphor 0.5%/menthol 0.5% Topical Lotion 1 Application(s) Topical two times a day  dextrose 5%. 1000 milliLiter(s) IV Continuous <Continuous>  dextrose 50% Injectable 12.5 Gram(s) IV Push once  dextrose 50% Injectable 25 Gram(s) IV Push once  dextrose 50% Injectable 25 Gram(s) IV Push once  docusate sodium 100 milliGRAM(s) Oral three times a day  finasteride 5 milliGRAM(s) Oral daily  influenza   Vaccine 0.5 milliLiter(s) IntraMuscular once  insulin lispro (HumaLOG) corrective regimen sliding scale   SubCutaneous three times a day before meals  insulin lispro (HumaLOG) corrective regimen sliding scale   SubCutaneous at bedtime  lidocaine 2% Injectable 1 Vial(s) Local Injection once  melatonin 5 milliGRAM(s) Oral at bedtime  nicotine - 21 mG/24Hr(s) Patch 1 patch Transdermal daily  simvastatin 10 milliGRAM(s) Oral at bedtime  sodium chloride 0.9%. 1000 milliLiter(s) IV Continuous <Continuous>  tamsulosin 0.4 milliGRAM(s) Oral at bedtime  triamcinolone 0.1% Ointment 1 Application(s) Topical two times a day      PRN MEDICATIONS  acetaminophen   Tablet .. 650 milliGRAM(s) Oral every 6 hours PRN  dextrose 40% Gel 15 Gram(s) Oral once PRN  glucagon  Injectable 1 milliGRAM(s) IntraMuscular once PRN  morphine  - Injectable 2 milliGRAM(s) IV Push every 4 hours PRN  zolpidem 5 milliGRAM(s) Oral at bedtime PRN      Vital Signs Last 24 Hrs  T(C): 37.6 (25 Sep 2019 05:09), Max: 38.1 (24 Sep 2019 17:32)  T(F): 99.6 (25 Sep 2019 05:09), Max: 100.6 (24 Sep 2019 17:32)  HR: 88 (25 Sep 2019 05:09) (79 - 99)  BP: 114/51 (25 Sep 2019 05:09) (107/55 - 132/54)  BP(mean): --  RR: 18 (25 Sep 2019 05:09) (16 - 18)  SpO2: 94% (25 Sep 2019 05:09) (94% - 98%)      PHYSICAL EXAM  General: NAD  HEENT: PERRLA, EOMI, clear oropharynx  Neck: supple  CV: (+) S1/S2 RRR  Lungs: clear to auscultation, no wheezes or rales  Abdomen: soft, mildly distended with some pain to palpation (+) BS  Ext: no edema  Skin: hyperpigmented rash to back and areas of petechia scattered  Neuro: alert and oriented X 3, no focal deficits  PIV: C/D/I         LABS:                        7.8    109.0 )-----------( 92       ( 25 Sep 2019 07:13 )             27.0         Mean Cell Volume : 93.1 fl  Mean Cell Hemoglobin : 27.0 pg  Mean Cell Hemoglobin Concentration : 29.0 gm/dL  Auto Neutrophil # : See Note  Auto Lymphocyte # : 5.7 K/uL  Auto Monocyte # : 27.8 K/uL  Auto Eosinophil # : 1.4 K/uL  Auto Basophil # : 0.1 K/uL  Auto Neutrophil % : 48.0 %  Auto Lymphocyte % : 3.0 %  Auto Monocyte % : 31.0 %  Auto Eosinophil % : 1.0 %  Auto Basophil % : 0.0 %      09-25    133<L>  |  96  |  30<H>  ----------------------------<  182<H>  3.7   |  18<L>  |  1.65<H>    Ca    8.7      25 Sep 2019 07:12  Phos  5.9     09-25  Mg     1.9     09-25    TPro  6.0  /  Alb  3.2<L>  /  TBili  0.8  /  DBili  x   /  AST  32  /  ALT  13  /  AlkPhos  136<H>  09-25      Mg 1.9  Phos 5.9      PT/INR - ( 25 Sep 2019 07:13 )   PT: 14.1 sec;   INR: 1.23 ratio         PTT - ( 25 Sep 2019 07:13 )  PTT:33.2 sec    LDH 2102  Uric Acid 4.0        RECENT CULTURES:    Culture - Urine (09.23.19 @ 09:55)    Specimen Source: .Urine    Culture Results:   No growth    Culture - Blood (09.23.19 @ 01:11)    Specimen Source: .Blood    Culture Results:   No growth to date.        RADIOLOGY & ADDITIONAL STUDIES:    EXAM:  CT ABDOMEN AND PELVIS OC IC                        PROCEDURE DATE:  09/24/2019    IMPRESSION: Hepatomegaly, with bilobar hypodense lesions, suspicious for metastatic   disease. Splenomegaly, with multiple splenic infarcts. Retroperitoneal lymphadenopathy.  Enlarged prostate gland. A 4.8 cm infrarenal abdominal aortic aneurysm, status post bifurcated   aortic stent graft.            EXAM:  CT ANGIO CHEST (W)AW IC                        PROCEDURE DATE:  09/23/2019    IMPRESSION: No central pulmonary embolus. The subsegmental branches are not   evaluated secondary to respiratory motion. 4.3 x 3.4 cm mass posterior left upper lobe. 1.3 cm anterior right   upper lobe nodule. These are likely neoplastic. Multiple hypodense lesions within the liver likely metastatic. Splenomegaly.  Peripheral hypodensities within the spleen may   represent infarct or metastatic lesions or combination of both. Diagnosis: Acute Leukemia    Protocol/Chemo Regimen: TBD    Day: N/A     ID: [id number]    Pt endorsed: rash to back and abdominal pain both have improved since yesterday    Review of Systems: Patient denies headache, dizziness, visual changes, chest pain, palpitations, SOB, abdominal pain, nausea, vomiting, diarrhea or dysuria.    Pain scale:                                    Diet: Consistent Carb    Allergies: No Known Allergies      ANTIMICROBIALS  levoFLOXacin  Tablet 500 milliGRAM(s) Oral every 24 hours      HEME/ONC MEDICATIONS  hydroxyurea 500 milliGRAM(s) Oral every 8 hours      STANDING MEDICATIONS  allopurinol 300 milliGRAM(s) Oral daily  amLODIPine   Tablet 5 milliGRAM(s) Oral daily  AQUAPHOR (petrolatum Ointment) 1 Application(s) Topical two times a day  Biotene Dry Mouth Oral Rinse 5 milliLiter(s) Swish and Spit five times a day  bisoprolol   Tablet 5 milliGRAM(s) Oral daily  calcium acetate 667 milliGRAM(s) Oral four times a day with meals  camphor 0.5%/menthol 0.5% Topical Lotion 1 Application(s) Topical two times a day  dextrose 5%. 1000 milliLiter(s) IV Continuous <Continuous>  dextrose 50% Injectable 12.5 Gram(s) IV Push once  dextrose 50% Injectable 25 Gram(s) IV Push once  dextrose 50% Injectable 25 Gram(s) IV Push once  docusate sodium 100 milliGRAM(s) Oral three times a day  finasteride 5 milliGRAM(s) Oral daily  influenza   Vaccine 0.5 milliLiter(s) IntraMuscular once  insulin lispro (HumaLOG) corrective regimen sliding scale   SubCutaneous three times a day before meals  insulin lispro (HumaLOG) corrective regimen sliding scale   SubCutaneous at bedtime  lidocaine 2% Injectable 1 Vial(s) Local Injection once  melatonin 5 milliGRAM(s) Oral at bedtime  nicotine - 21 mG/24Hr(s) Patch 1 patch Transdermal daily  simvastatin 10 milliGRAM(s) Oral at bedtime  sodium chloride 0.9%. 1000 milliLiter(s) IV Continuous <Continuous>  tamsulosin 0.4 milliGRAM(s) Oral at bedtime  triamcinolone 0.1% Ointment 1 Application(s) Topical two times a day      PRN MEDICATIONS  acetaminophen   Tablet .. 650 milliGRAM(s) Oral every 6 hours PRN  dextrose 40% Gel 15 Gram(s) Oral once PRN  glucagon  Injectable 1 milliGRAM(s) IntraMuscular once PRN  morphine  - Injectable 2 milliGRAM(s) IV Push every 4 hours PRN  zolpidem 5 milliGRAM(s) Oral at bedtime PRN      Vital Signs Last 24 Hrs  T(C): 37.6 (25 Sep 2019 05:09), Max: 38.1 (24 Sep 2019 17:32)  T(F): 99.6 (25 Sep 2019 05:09), Max: 100.6 (24 Sep 2019 17:32)  HR: 88 (25 Sep 2019 05:09) (79 - 99)  BP: 114/51 (25 Sep 2019 05:09) (107/55 - 132/54)  BP(mean): --  RR: 18 (25 Sep 2019 05:09) (16 - 18)  SpO2: 94% (25 Sep 2019 05:09) (94% - 98%)      PHYSICAL EXAM  General: NAD  HEENT: PERRLA, EOMI, clear oropharynx  Neck: supple  CV: (+) S1/S2 RRR  Lungs: clear to auscultation, no wheezes or rales  Abdomen: soft, mildly distended with some pain to palpation (+) BS  Ext: no edema  Skin: hyperpigmented rash to back and areas of petechia scattered  Neuro: alert and oriented X 3, no focal deficits  PIV: C/D/I         LABS:                        7.8    109.0 )-----------( 92       ( 25 Sep 2019 07:13 )             27.0         Mean Cell Volume : 93.1 fl  Mean Cell Hemoglobin : 27.0 pg  Mean Cell Hemoglobin Concentration : 29.0 gm/dL  Auto Neutrophil # : See Note  Auto Lymphocyte # : 5.7 K/uL  Auto Monocyte # : 27.8 K/uL  Auto Eosinophil # : 1.4 K/uL  Auto Basophil # : 0.1 K/uL  Auto Neutrophil % : 48.0 %  Auto Lymphocyte % : 3.0 %  Auto Monocyte % : 31.0 %  Auto Eosinophil % : 1.0 %  Auto Basophil % : 0.0 %      09-25    133<L>  |  96  |  30<H>  ----------------------------<  182<H>  3.7   |  18<L>  |  1.65<H>    Ca    8.7      25 Sep 2019 07:12  Phos  5.9     09-25  Mg     1.9     09-25    TPro  6.0  /  Alb  3.2<L>  /  TBili  0.8  /  DBili  x   /  AST  32  /  ALT  13  /  AlkPhos  136<H>  09-25      Mg 1.9  Phos 5.9      PT/INR - ( 25 Sep 2019 07:13 )   PT: 14.1 sec;   INR: 1.23 ratio         PTT - ( 25 Sep 2019 07:13 )  PTT:33.2 sec    LDH 2102  Uric Acid 4.0        RECENT CULTURES:    Culture - Urine (09.23.19 @ 09:55)    Specimen Source: .Urine    Culture Results:   No growth    Culture - Blood (09.23.19 @ 01:11)    Specimen Source: .Blood    Culture Results:   No growth to date.        RADIOLOGY & ADDITIONAL STUDIES:    EXAM:  CT ABDOMEN AND PELVIS OC IC                        PROCEDURE DATE:  09/24/2019    IMPRESSION: Hepatomegaly, with bilobar hypodense lesions, suspicious for metastatic   disease. Splenomegaly, with multiple splenic infarcts. Retroperitoneal lymphadenopathy.  Enlarged prostate gland. A 4.8 cm infrarenal abdominal aortic aneurysm, status post bifurcated   aortic stent graft.            EXAM:  CT ANGIO CHEST (W)AW IC                        PROCEDURE DATE:  09/23/2019    IMPRESSION: No central pulmonary embolus. The subsegmental branches are not   evaluated secondary to respiratory motion. 4.3 x 3.4 cm mass posterior left upper lobe. 1.3 cm anterior right   upper lobe nodule. These are likely neoplastic. Multiple hypodense lesions within the liver likely metastatic. Splenomegaly.  Peripheral hypodensities within the spleen may   represent infarct or metastatic lesions or combination of both.

## 2019-09-25 NOTE — PROVIDER CONTACT NOTE (OTHER) - ACTION/TREATMENT ORDERED:
Tylenol 650 mg, Blood culture x2, UA, CUR. CXR not needed due to recent CT abdomen. NP Malilo aware.

## 2019-09-25 NOTE — PROGRESS NOTE ADULT - PROBLEM SELECTOR PLAN 8
Derm consulted  for chronic rashes upper back and feet  no primary lesions appreciated on exam, all appear secondary to scratching including excoriations, prurigo nodules, hyperpigmentation, and macular amyloid (brown colored deposition in the skin due to chronic rubbing/scratching). Pruritus can be secondary to underlying malignancy as well as underlying kidney disease (CKD Stage 3) vs obstructive process in the liver due to lung cancer mets (though normal Bilirubin, pending imaging)  -skin care with Vaseline BID   -Triamcinolone ointment 0.1% BID 454g tub, patient will need help to apply to the back   -Sarna cream PRN itching Heparin sc d/c'ed due to pending worsening thrombocytopenia  Encourage ambulation      Contact info [phone] Chronic rash to upper back and feet  No primary lesions appreciated on exam, all appear secondary to scratching  Can be 2/2 underlying malignancy, CKD or obstructive process if the liver  Continue Vaseline and Triamcinolone ointment and Sarna cream   Dermatology following

## 2019-09-25 NOTE — PHYSICAL THERAPY INITIAL EVALUATION ADULT - PERTINENT HX OF CURRENT PROBLEM, REHAB EVAL
Pt is an 83 yo M with PMHx of T2DM, HTN, PVD, CAD s/p recent stent x 3 (9/19), AAA with stent, current smoker with newly diagnosis of Lung CA with mets to the liver ( Lung bx completed in [place], currently not receiving tx) admitted for management of acute leukemia. Patient is status post BM bx completed 9/23, currently awaiting results.

## 2019-09-25 NOTE — PHYSICAL THERAPY INITIAL EVALUATION ADULT - ADDITIONAL COMMENTS
As per pt, pt lives in a private house w/ spouse and 13 steps to enter w/ [facility]. PTA pt was independent w/ all mobility and ADLs.

## 2019-09-25 NOTE — CONSULT NOTE ADULT - SUBJECTIVE AND OBJECTIVE BOX
Patient is a 84y old  Male who presents with a chief complaint of 84M w/ generalized weakness and SOB (25 Sep 2019 07:37)    HPI:  84y man with DM2, HTN, CVA, CAD s/p stent, PVD, BPH, current heavy smoker, newly dx lung ca w/ liver mets presents to ED with progressively worsening SOB and generalized weakness. Pt felt severely SOB this AM, and had fever and chills for the past couple days, prompting him to the ED. ROS is also positive for LE edema, nonproductive cough, gradual weight loss over the past year. Recently returned from [facility] 1 week ago.     In terms cancer hx, family endorsed that his WBC has always been high, in the 30k range. He had a bone marrow bx done 2  years ago in [facility], which was negative for malignancy. During his most recent trip (May 2019- returned to the [facility] 1 week ago), pt had multiple CT and MRI, which showed mass the lung with metastasis to the liver, had a lung bx in Aug 2019 which was positive for malignancy, but pt does not know which type. Pt has a CD of the imaging at bedside.     ED course:   vitals: Tmax  100.6, sinus tachy 129, BP 90-120s/40s-50s, sat well on RA   s/p vancomycin x1 and zosyn x1, 250ml of IVF x1 (23 Sep 2019 03:03)     prior hospital charts reviewed [  ]  primary team notes reviewed [  ]  other consultant notes reviewed [  ]  PAST MEDICAL & SURGICAL HISTORY:  Lung cancer  High cholesterol  HTN (hypertension)  Diabetes mellitus  No significant past surgical history    Allergies  No Known Allergies    ANTIMICROBIALS (past 90 days)  MEDICATIONS  (STANDING):  fluconAZOLE IVPB   100 mL/Hr IV Intermittent (09-23-19 @ 11:53)    levoFLOXacin  Tablet   500 milliGRAM(s) Oral (09-25-19 @ 06:28)    piperacillin/tazobactam IVPB.   200 mL/Hr IV Intermittent (09-22-19 @ 23:11)    piperacillin/tazobactam IVPB..   25 mL/Hr IV Intermittent (09-24-19 @ 16:44)   25 mL/Hr IV Intermittent (09-24-19 @ 06:24)   25 mL/Hr IV Intermittent (09-23-19 @ 23:15)   25 mL/Hr IV Intermittent (09-23-19 @ 15:00)   25 mL/Hr IV Intermittent (09-23-19 @ 08:12)    vancomycin  IVPB   250 mL/Hr IV Intermittent (09-22-19 @ 23:57)    vancomycin  IVPB   100 mL/Hr IV Intermittent (09-23-19 @ 13:31)      ANTIMICROBIALS:    levoFLOXacin  Tablet 500 every 24 hours    OTHER MEDS: MEDICATIONS  (STANDING):  acetaminophen   Tablet .. 650 every 6 hours PRN  allopurinol 300 daily  amLODIPine   Tablet 5 daily  bisoprolol   Tablet 5 daily  dextrose 40% Gel 15 once PRN  dextrose 50% Injectable 12.5 once  dextrose 50% Injectable 25 once  dextrose 50% Injectable 25 once  docusate sodium 100 three times a day  finasteride 5 daily  glucagon  Injectable 1 once PRN  hydroxyurea 500 every 8 hours  influenza   Vaccine 0.5 once  insulin lispro (HumaLOG) corrective regimen sliding scale  three times a day before meals  insulin lispro (HumaLOG) corrective regimen sliding scale  at bedtime  melatonin 5 at bedtime  morphine  - Injectable 2 every 4 hours PRN  simvastatin 10 at bedtime  tamsulosin 0.4 at bedtime  zolpidem 5 at bedtime PRN    SOCIAL HISTORY:   hx smoking  non-smoker    FAMILY HISTORY:  No pertinent family history in first degree relatives    REVIEW OF SYSTEMS  [  ] ROS unobtainable because:    [  ] All other systems negative except as noted below:	    Constitutional:  [ ] fever [ ] chills  [ ] weight loss  [ ] weakness  Skin:  [ ] rash [ ] phlebitis	  Eyes: [ ] icterus [ ] pain  [ ] discharge	  ENMT: [ ] sore throat  [ ] thrush [ ] ulcers [ ] exudates  Respiratory: [ ] dyspnea [ ] hemoptysis [ ] cough [ ] sputum	  Cardiovascular:  [ ] chest pain [ ] palpitations [ ] edema	  Gastrointestinal:  [ ] nausea [ ] vomiting [ ] diarrhea [ ] constipation [ ] pain	  Genitourinary:  [ ] dysuria [ ] frequency [ ] hematuria [ ] discharge [ ] flank pain  [ ] incontinence  Musculoskeletal:  [ ] myalgias [ ] arthralgias [ ] arthritis  [ ] back pain  Neurological:  [ ] headache [ ] seizures  [ ] confusion/altered mental status  Psychiatric:  [ ] anxiety [ ] depression	  Hematology/Lymphatics:  [ ] lymphadenopathy  Endocrine:  [ ] adrenal [ ] thyroid  Allergic/Immunologic:	 [ ] transplant [ ] seasonal    Vital Signs Last 24 Hrs  T(F): 99.6 (09-25-19 @ 05:09), Max: 100.6 (09-22-19 @ 22:55)  Vital Signs Last 24 Hrs  HR: 88 (09-25-19 @ 05:09) (79 - 99)  BP: 114/51 (09-25-19 @ 05:09) (107/55 - 132/54)  RR: 18 (09-25-19 @ 05:09)  SpO2: 94% (09-25-19 @ 05:09) (94% - 98%)  Wt(kg): --    EXAM:  Constitutional: Not in acute distress  Eyes: pupils bilaterally reactive to light. No icterus.  Oral cavity: Clear, no lesions  Neck: No neck vein distension noted  RS: Chest clear to auscultation bilaterally. No wheeze/rhonchi/crepitations.  CVS: S1, S2 heard. Regular rate and rhythm. No murmurs/rubs/gallops.  Abdomen: Soft. No guarding/rigidity/tenderness.  : No acute abnormalities  Extremities: Warm. No pedal edema  Skin: No lesions noted  Vascular: No evidence of phlebitis  Neuro: Alert, oriented to time/place/person  Cranial nerves 2-12 grossly normal. No focal abnormalities                          7.8    109.0 )-----------( 92       ( 25 Sep 2019 07:13 )             27.0     09-25    133<L>  |  96  |  30<H>  ----------------------------<  182<H>  3.7   |  18<L>  |  1.65<H>    Ca    8.7      25 Sep 2019 07:12  Phos  5.9     09-25  Mg     1.9     09-25    TPro  6.0  /  Alb  3.2<L>  /  TBili  0.8  /  DBili  x   /  AST  32  /  ALT  13  /  AlkPhos  136<H>  09-25    MICROBIOLOGY:Vancomycin Level, Random: 13.5 (09-23 @ 06:08)    Culture - Urine (collected 23 Sep 2019 09:55)  Source: .Urine  Final Report (24 Sep 2019 09:54):    No growth    Culture - Blood (collected 23 Sep 2019 01:11)  Source: .Blood  Preliminary Report (24 Sep 2019 02:01):    No growth to date.    Culture - Blood (collected 23 Sep 2019 01:11)  Source: .Blood  Preliminary Report (24 Sep 2019 02:01):    No growth to date.              Rapid RVP Result: NotDetec (09-23 @ 11:19)        RADIOLOGY:  imaging below personally reviewed    OTHER TESTS: #Trevin [place]  # [id number]  ========================  Patient is a 84y old  Male who presents with a chief complaint of 84M w/ generalized weakness and SOB (25 Sep 2019 07:37)    HPI:  84y man with DM2, HTN, CVA, CAD s/p stent, PVD, BPH, current heavy smoker, newly dx lung ca w/ liver mets presents to ED with progressively worsening SOB and generalized weakness. Pt felt severely SOB this AM, and had fever and chills for the past couple days, prompting him to the ED. ROS is also positive for LE edema, nonproductive cough, gradual weight loss over the past year. Recently returned from [facility] 1 week ago.     In terms cancer hx, family endorsed that his WBC has always been high, in the 30k range. He had a bone marrow bx done 2  years ago in [facility], which was negative for malignancy. During his most recent trip (May 2019- returned [place] [facility] ago[facility] pt had multiple CT and MRI, which showed mass the lung with metastasis to the liver, had a lung bx in Aug 2019 which was positive for malignancy, but pt does not know which type. Pt has a CD of the imaging at bedside.     ED course:   vitals: Tmax  100.6, sinus tachy 129, BP 90-120s/40s-50s, sat well on RA   s/p vancomycin x1 and zosyn x1, 250ml of IVF x1 (23 Sep 2019 03:03)  -------------  - Hem/Onc and Vascular surgery following  - ID consulted for fevers  - Above HPI reviewed and reconciled with patient  - Denied rash, cough, coryza, dysuria, recent antibiotic use    primary team notes reviewed [x]  other consultant notes reviewed [x]    PAST MEDICAL & SURGICAL HISTORY:  Lung cancer  High cholesterol  HTN (hypertension)  Diabetes mellitus  No significant past surgical history    Allergies  No Known Allergies    ANTIMICROBIALS (past 90 days)  MEDICATIONS  (STANDING):  fluconAZOLE IVPB   100 mL/Hr IV Intermittent (09-23-19 @ 11:53)    levoFLOXacin  Tablet   500 milliGRAM(s) Oral (09-25-19 @ 06:28)    piperacillin/tazobactam IVPB.   200 mL/Hr IV Intermittent (09-22-19 @ 23:11)    piperacillin/tazobactam IVPB..   25 mL/Hr IV Intermittent (09-24-19 @ 16:44)   25 mL/Hr IV Intermittent (09-24-19 @ 06:24)   25 mL/Hr IV Intermittent (09-23-19 @ 23:15)   25 mL/Hr IV Intermittent (09-23-19 @ 15:00)   25 mL/Hr IV Intermittent (09-23-19 @ 08:12)    vancomycin  IVPB   250 mL/Hr IV Intermittent (09-22-19 @ 23:57)    vancomycin  IVPB   100 mL/Hr IV Intermittent (09-23-19 @ 13:31)      ANTIMICROBIALS:    levoFLOXacin  Tablet 500 every 24 hours    OTHER MEDS: MEDICATIONS  (STANDING):  acetaminophen   Tablet .. 650 every 6 hours PRN  allopurinol 300 daily  amLODIPine   Tablet 5 daily  bisoprolol   Tablet 5 daily  dextrose 40% Gel 15 once PRN  dextrose 50% Injectable 12.5 once  dextrose 50% Injectable 25 once  dextrose 50% Injectable 25 once  docusate sodium 100 three times a day  finasteride 5 daily  glucagon  Injectable 1 once PRN  hydroxyurea 500 every 8 hours  influenza   Vaccine 0.5 once  insulin lispro (HumaLOG) corrective regimen sliding scale  three times a day before meals  insulin lispro (HumaLOG) corrective regimen sliding scale  at bedtime  melatonin 5 at bedtime  morphine  - Injectable 2 every 4 hours PRN  simvastatin 10 at bedtime  tamsulosin 0.4 at bedtime  zolpidem 5 at bedtime PRN    SOCIAL HISTORY:  - migrated from [facility] in 1986. Recently travelled to [facility], returned 1 week prior to admission  - smoked 1 PPD X >60 years  - social alcohol use    FAMILY HISTORY:  No pertinent family history in first degree relatives    REVIEW OF SYSTEMS  [  ] ROS unobtainable because:    [x] All other systems negative except as noted below:	  Constitutional:  [x] fever [x] chills  [x] weight loss  [x] weakness  Skin:  [ ] rash [ ] phlebitis	  Eyes: [ ] icterus [ ] pain  [ ] discharge	  ENMT: [ ] sore throat  [ ] thrush [ ] ulcers [ ] exudates  Respiratory: [x] dyspnea [ ] hemoptysis [x] cough [ ] sputum	  Cardiovascular:  [ ] chest pain [ ] palpitations [ ] edema	  Gastrointestinal:  [ ] nausea [ ] vomiting [ ] diarrhea [ ] constipation [ ] pain	  Genitourinary:  [ ] dysuria [ ] frequency [ ] hematuria [ ] discharge [ ] flank pain  [ ] incontinence  Musculoskeletal:  [ ] myalgias [ ] arthralgias [ ] arthritis  [ ] back pain  Neurological:  [ ] headache [ ] seizures  [ ] confusion/altered mental status  Psychiatric:  [ ] anxiety [ ] depression	  Hematology/Lymphatics:  [ ] lymphadenopathy  Endocrine:  [ ] adrenal [ ] thyroid  Allergic/Immunologic:	 [ ] transplant [ ] seasonal    Vital Signs Last 24 Hrs  T(F): 99.6 (09-25-19 @ 05:09), Max: 100.6 (09-22-19 @ 22:55)  Vital Signs Last 24 Hrs  HR: 88 (09-25-19 @ 05:09) (79 - 99)  BP: 114/51 (09-25-19 @ 05:09) (107/55 - 132/54)  RR: 18 (09-25-19 @ 05:09)  SpO2: 94% (09-25-19 @ 05:09) (94% - 98%)  Wt(kg): --    EXAM:  Constitutional: Not in acute distress  Eyes: pupils bilaterally reactive to light. No icterus.  Oral cavity: Clear, no lesions  Neck: No neck vein distension noted  RS: Chest clear to auscultation bilaterally. No wheeze/rhonchi/crepitations.  CVS: S1, S2 heard. Regular rate and rhythm. No murmurs/rubs/gallops.  Abdomen: Soft. No guarding/rigidity/tenderness.  : No acute abnormalities  Extremities: Warm. No pedal edema  Skin: No lesions noted  Vascular: No evidence of phlebitis  Neuro: Alert, oriented to time/place/person  Cranial nerves 2-12 grossly normal. No focal abnormalities                          7.8    109.0 )-----------( 92       ( 25 Sep 2019 07:13 )             27.0     09-25    133<L>  |  96  |  30<H>  ----------------------------<  182<H>  3.7   |  18<L>  |  1.65<H>    Ca    8.7      25 Sep 2019 07:12  Phos  5.9     09-25  Mg     1.9     09-25    TPro  6.0  /  Alb  3.2<L>  /  TBili  0.8  /  DBili  x   /  AST  32  /  ALT  13  /  AlkPhos  136<H>  09-25    MICROBIOLOGY:Vancomycin Level, Random: 13.5 (09-23 @ 06:08)    Culture - Urine (collected 23 Sep 2019 09:55)  Source: .Urine  Final Report (24 Sep 2019 09:54):    No growth    Culture - Blood (collected 23 Sep 2019 01:11)  Source: .Blood  Preliminary Report (24 Sep 2019 02:01):    No growth to date.    Culture - Blood (collected 23 Sep 2019 01:11)  Source: .Blood  Preliminary Report (24 Sep 2019 02:01):    No growth to date.    Rapid RVP Result: NotDetec (09-23 @ 11:19)    HIV-1/2 Antigen/Antibody Screen by CMIA (09.24.19 @ 17:56)    HIV-1/2 Combo Result: Nonreact    Legionella pneumophila Antigen, Urine (09.23.19 @ 16:11)    Legionella Antigen, Urine: Negative    Urinalysis (09.23.19 @ 06:09)    pH Urine: 8.0    Glucose Qualitative, Urine: Negative    Blood, Urine: Trace    Color: Colorless    Urine Appearance: Clear    Bilirubin: Negative    Ketone - Urine: Negative    Specific Gravity: 1.015    Protein, Urine: 30 mg/dL    Urobilinogen: Negative    Nitrite: Negative    Leukocyte Esterase Concentration: Negative    RADIOLOGY:  imaging below personally reviewed  < from: CT Abdomen and Pelvis w/ Oral Cont and w/ IV Cont (09.24.19 @ 22:26) >  Hepatomegaly, with bilobar hypodense lesions, suspicious for metastatic   disease.  Splenomegaly, with multiple splenic infarcts.  Retroperitoneal lymphadenopathy.  Enlarged prostate gland.  A 4.8 cm infrarenal abdominal aortic aneurysm, status post bifurcated   aortic stent graft.  < end of copied text >    < from: CT Angio Chest w/ IV Cont (09.23.19 @ 00:09) >  1.  No central pulmonary embolus. The subsegmental branches are not   evaluated secondary to respiratory motion.  2.  4.3 x 3.4 cm mass posterior left upper lobe. 1.3 cm anterior right   upper lobe nodule. These are likely neoplastic.  3.  Multiple hypodense lesions within the liver likely metastatic.  4.  Splenomegaly. Peripheral hypodensities within the spleen may   represent infarct or metastatic lesions or combination of both.  < end of copied text > #Trevin [place]  # [id number]  ========================  Patient is a 84y old  Male who presents with a chief complaint of 84M w/ generalized weakness and SOB (25 Sep 2019 07:37)    HPI:  84y man with DM2, HTN, CVA, CAD s/p stent, PVD, BPH, current heavy smoker, newly dx lung ca w/ liver mets presents to ED with progressively worsening SOB and generalized weakness. Pt felt severely SOB this AM, and had fever and chills for the past couple days, prompting him to the ED. ROS is also positive for LE edema, nonproductive cough, gradual weight loss over the past year. Recently returned from [facility] 1 week ago.     In terms cancer hx, family endorsed that his WBC has always been high, in the 30k range. He had a bone marrow bx done 2  years ago in [facility], which was negative for malignancy. During his most recent trip (May 2019- returned to the [facility] 1 week ago), pt had multiple CT and MRI, which showed mass the lung with metastasis to the liver, had a lung bx in Aug 2019 which was positive for malignancy, but pt does not know which type. Pt has a CD of the imaging at bedside.     ED course:   vitals: Tmax  100.6, sinus tachy 129, BP 90-120s/40s-50s, sat well on RA   s/p vancomycin x1 and zosyn x1, 250ml of IVF x1 (23 Sep 2019 03:03)  -------------  - Hem/Onc and Vascular surgery following  - ID consulted for fevers  - Above HPI reviewed and reconciled with patient  - Denied rash, cough, coryza, dysuria, recent antibiotic use    primary team notes reviewed [x]  other consultant notes reviewed [x]    PAST MEDICAL & SURGICAL HISTORY:  Lung cancer  High cholesterol  HTN (hypertension)  Diabetes mellitus  No significant past surgical history    Allergies  No Known Allergies    ANTIMICROBIALS (past 90 days)  MEDICATIONS  (STANDING):  fluconAZOLE IVPB   100 mL/Hr IV Intermittent (09-23-19 @ 11:53)    levoFLOXacin  Tablet   500 milliGRAM(s) Oral (09-25-19 @ 06:28)    piperacillin/tazobactam IVPB.   200 mL/Hr IV Intermittent (09-22-19 @ 23:11)    piperacillin/tazobactam IVPB..   25 mL/Hr IV Intermittent (09-24-19 @ 16:44)   25 mL/Hr IV Intermittent (09-24-19 @ 06:24)   25 mL/Hr IV Intermittent (09-23-19 @ 23:15)   25 mL/Hr IV Intermittent (09-23-19 @ 15:00)   25 mL/Hr IV Intermittent (09-23-19 @ 08:12)    vancomycin  IVPB   250 mL/Hr IV Intermittent (09-22-19 @ 23:57)    vancomycin  IVPB   100 mL/Hr IV Intermittent (09-23-19 @ 13:31)      ANTIMICROBIALS:    levoFLOXacin  Tablet 500 every 24 hours    OTHER MEDS: MEDICATIONS  (STANDING):  acetaminophen   Tablet .. 650 every 6 hours PRN  allopurinol 300 daily  amLODIPine   Tablet 5 daily  bisoprolol   Tablet 5 daily  dextrose 40% Gel 15 once PRN  dextrose 50% Injectable 12.5 once  dextrose 50% Injectable 25 once  dextrose 50% Injectable 25 once  docusate sodium 100 three times a day  finasteride 5 daily  glucagon  Injectable 1 once PRN  hydroxyurea 500 every 8 hours  influenza   Vaccine 0.5 once  insulin lispro (HumaLOG) corrective regimen sliding scale  three times a day before meals  insulin lispro (HumaLOG) corrective regimen sliding scale  at bedtime  melatonin 5 at bedtime  morphine  - Injectable 2 every 4 hours PRN  simvastatin 10 at bedtime  tamsulosin 0.4 at bedtime  zolpidem 5 at bedtime PRN    SOCIAL HISTORY:  - migrated from [facility] in 1986. Recently travelled to [facility], returned 1 week prior to admission  - smoked 1 PPD X >60 years  - social alcohol use    FAMILY HISTORY:  Mother with DM    REVIEW OF SYSTEMS  [  ] ROS unobtainable because:    [x] All other systems negative except as noted below:	  Constitutional:  [x] fever [x] chills  [x] weight loss  [x] weakness  Skin:  [ ] rash [ ] phlebitis	  Eyes: [ ] icterus [ ] pain  [ ] discharge	  ENMT: [ ] sore throat  [ ] thrush [ ] ulcers [ ] exudates  Respiratory: [x] dyspnea [ ] hemoptysis [x] cough [ ] sputum	  Cardiovascular:  [ ] chest pain [ ] palpitations [ ] edema	  Gastrointestinal:  [ ] nausea [ ] vomiting [ ] diarrhea [ ] constipation [ ] pain	  Genitourinary:  [ ] dysuria [ ] frequency [ ] hematuria [ ] discharge [ ] flank pain  [ ] incontinence  Musculoskeletal:  [ ] myalgias [ ] arthralgias [ ] arthritis  [ ] back pain  Neurological:  [ ] headache [ ] seizures  [ ] confusion/altered mental status  Psychiatric:  [ ] anxiety [ ] depression	  Hematology/Lymphatics:  [ ] lymphadenopathy  Endocrine:  [ ] adrenal [ ] thyroid  Allergic/Immunologic:	 [ ] transplant [ ] seasonal    Vital Signs Last 24 Hrs  T(F): 99.6 (09-25-19 @ 05:09), Max: 100.6 (09-22-19 @ 22:55)  Vital Signs Last 24 Hrs  HR: 88 (09-25-19 @ 05:09) (79 - 99)  BP: 114/51 (09-25-19 @ 05:09) (107/55 - 132/54)  RR: 18 (09-25-19 @ 05:09)  SpO2: 94% (09-25-19 @ 05:09) (94% - 98%)  Wt(kg): --    EXAM:  Constitutional: Not in acute distress  Eyes: pupils bilaterally reactive to light. No icterus.  Oral cavity: Clear, no lesions  Neck: No neck vein distension noted  RS: Chest clear to auscultation bilaterally. No wheeze/rhonchi/crepitations.  CVS: S1, S2 heard. Regular rate and rhythm. No murmurs/rubs/gallops.  Abdomen: Soft. No guarding/rigidity/tenderness.   : No acute abnormalities  Extremities: Warm. No pedal edema  Skin: No lesions noted  Vascular: No evidence of phlebitis  Neuro: Alert, oriented to time/place/person  Cranial nerves 2-12 grossly normal. No focal abnormalities                          7.8    109.0 )-----------( 92       ( 25 Sep 2019 07:13 )             27.0     09-25    133<L>  |  96  |  30<H>  ----------------------------<  182<H>  3.7   |  18<L>  |  1.65<H>    Ca    8.7      25 Sep 2019 07:12  Phos  5.9     09-25  Mg     1.9     09-25    TPro  6.0  /  Alb  3.2<L>  /  TBili  0.8  /  DBili  x   /  AST  32  /  ALT  13  /  AlkPhos  136<H>  09-25    MICROBIOLOGY:Vancomycin Level, Random: 13.5 (09-23 @ 06:08)    Culture - Urine (collected 23 Sep 2019 09:55)  Source: .Urine  Final Report (24 Sep 2019 09:54):    No growth    Culture - Blood (collected 23 Sep 2019 01:11)  Source: .Blood  Preliminary Report (24 Sep 2019 02:01):    No growth to date.    Culture - Blood (collected 23 Sep 2019 01:11)  Source: .Blood  Preliminary Report (24 Sep 2019 02:01):    No growth to date.    Rapid RVP Result: NotDetec (09-23 @ 11:19)    HIV-1/2 Antigen/Antibody Screen by CMIA (09.24.19 @ 17:56)    HIV-1/2 Combo Result: Nonreact    Legionella pneumophila Antigen, Urine (09.23.19 @ 16:11)    Legionella Antigen, Urine: Negative    Urinalysis (09.23.19 @ 06:09)    pH Urine: 8.0    Glucose Qualitative, Urine: Negative    Blood, Urine: Trace    Color: Colorless    Urine Appearance: Clear    Bilirubin: Negative    Ketone - Urine: Negative    Specific Gravity: 1.015    Protein, Urine: 30 mg/dL    Urobilinogen: Negative    Nitrite: Negative    Leukocyte Esterase Concentration: Negative    RADIOLOGY:  imaging below personally reviewed  < from: CT Abdomen and Pelvis w/ Oral Cont and w/ IV Cont (09.24.19 @ 22:26) >  Hepatomegaly, with bilobar hypodense lesions, suspicious for metastatic   disease.  Splenomegaly, with multiple splenic infarcts.  Retroperitoneal lymphadenopathy.  Enlarged prostate gland.  A 4.8 cm infrarenal abdominal aortic aneurysm, status post bifurcated   aortic stent graft.  < end of copied text >    < from: CT Angio Chest w/ IV Cont (09.23.19 @ 00:09) >  1.  No central pulmonary embolus. The subsegmental branches are not   evaluated secondary to respiratory motion.  2.  4.3 x 3.4 cm mass posterior left upper lobe. 1.3 cm anterior right   upper lobe nodule. These are likely neoplastic.  3.  Multiple hypodense lesions within the liver likely metastatic.  4.  Splenomegaly. Peripheral hypodensities within the spleen may   represent infarct or metastatic lesions or combination of both.  < end of copied text >

## 2019-09-25 NOTE — CONSULT NOTE ADULT - ASSESSMENT
#Pruritus- no primary lesions appreciated on exam, all appear secondary to scratching including excoriations, prurigo nodules, hyperpigmentation, and macular amyloid (brown colored deposition in the skin due to chronic rubbing/scratching). Pruritus can be secondary to underlying malignancy as well as underlying kidney disease (CKD Stage 3) vs obstructive process in the liver due to lung cancer mets (though normal Bilirubin)  -Recommend skin care with Vaseline BID   -Triamcinolone ointment 0.1% BID 454g tub, patient will need help to apply to the back   -Sarna cream PRN itching     Jayla Mojica MD  PGY-3, Dermatology.

## 2019-09-25 NOTE — PROGRESS NOTE ADULT - PROBLEM SELECTOR PLAN 6
FS with sliding scale   Check A1C  PVD, on Cilostazol 50 mg bid, resume if ok with IR Pletal on hold for Mediport placement  Will potentially restart 24 hours after unless thrombocytopenia continues to worsen

## 2019-09-25 NOTE — PROGRESS NOTE ADULT - ASSESSMENT
Mandarin speaking 84 M with hx of DM2, HTN, PVD, CAD s/p 3 stents Sept 2019, AAA/4 stents, current smoker,  newly dx lung cancer (s/p needle bx in [place], not on tx ), with liver mets p/w SOB, weakness found to have leukocytosis with wbc 129 and 20% blasts. Pt has long standing history of abnormal CBC (since 2012),    bx 2 yrs w/o malignancy per family now presented with blast crisis. Smear s/o CML with blast phase but flow is pending to confirm. He underwent a BMBX on 9/23. He also noted to have Large EMILE lung mass, smaller Right lung nodule and findings concerning for liver mets. Pt has leukocytosis, anemia and thrombocytopenia due to malignancy This is an 85 yo M with PMHx of T2DM, HTN, PVD, CAD s/p recent stent x 3 (9/19), AAA with stent, current smoker with newly diagnosis of Lung CA with mets to the liver ( Lung bx completed in [place], currently not receiving tx) admitted for management of acute leukemia. Patient is status post BM bx completed 9/23, currently awaiting results.

## 2019-09-25 NOTE — PROGRESS NOTE ADULT - PROBLEM SELECTOR PLAN 1
- WBC of 129  with 20% blasts, 11% metameylocytes, 7% meylocytes  - Pt reports that he had leukocytosis (40k) in the past, had BM bx in [place] about 2 years ago. He was told that no treatment was required at that time. No report here.  -peripheral smear reviewed (9/23): numerous blasts, metamyelocytes, myelocytes, nucleated RBCs, some clumped platelets.   -Low suspicion of APL with no visible blasts with bilobed nucleus  and normal coags  -no signs of leukostasis currently; blasts ~40K, does not need emergent leukophoresis  -c/w allopurinol 300 mg qd  -IVF hydration with NS at 75 cc/hr  9/24 increase  hydrea1 gm q8h  -BM biopsy done on 9/23, foundation testing also sent  -monitor labs q12h CBC w/diff, TLS, DIC panel, CMP  -fu peripheral flow cytometry  -transfuse to keep Hg>7 and plts >10 or 15 if febrile    Requested IR port placement, pending IR eval 9/23 status post BM bx, follow up results  Monitor CBC/Lytes and transfuse/replete PRN  TLS labs q 12  Strict Is and Os/daily weights/Mouth Care  Antiemetics   IVF  Continue Allopurinol 300 mg daily  awaiting Mediport placement

## 2019-09-25 NOTE — PROGRESS NOTE ADULT - PROBLEM SELECTOR PLAN 5
To resume Amlodipine 5 mg qd  with hold parameter  Monitor closely Continue Amlodipine 5mg PO daily and Bisoprolol 5mg PO daily  Monitor BP closely

## 2019-09-25 NOTE — PROGRESS NOTE ADULT - PROBLEM SELECTOR PLAN 10
Heparin sc d/c'ed due to pending worsening thrombocytopenia  Encourage ambulation      Contact info [phone]

## 2019-09-25 NOTE — CONSULT NOTE ADULT - ATTENDING COMMENTS
I will continue to follow. Please feel free to contact me with any further questions.    Anatoliy Parham M.D.  [facility] Division of Infectious Disease  8AM-5PM: Pager Number [phone]  After Hours (or if no response): Please contact the Infectious Diseases Office at [phone]
I performed a history and physical exam of the patient and discussed  the findings and plan with the house officer. I reviewed the resident note and agree with the findings and plan

## 2019-09-25 NOTE — PROGRESS NOTE ADULT - PROBLEM SELECTOR PLAN 7
Continue Simvastatin Derm consulted  for chronic rashes upper back and feet  no primary lesions appreciated on exam, all appear secondary to scratching including excoriations, prurigo nodules, hyperpigmentation, and macular amyloid (brown colored deposition in the skin due to chronic rubbing/scratching). Pruritus can be secondary to underlying malignancy as well as underlying kidney disease (CKD Stage 3) vs obstructive process in the liver due to lung cancer mets (though normal Bilirubin, pending imaging)  -skin care with Vaseline BID   -Triamcinolone ointment 0.1% BID 454g tub, patient will need help to apply to the back   -Sarna cream PRN itching HgA1c  FS Ac & HS with HISS   Consistent carb diet

## 2019-09-25 NOTE — PROGRESS NOTE ADULT - PROBLEM SELECTOR PLAN 4
s/p stent 9/2019  EKG 9/24 NSR  on ASA 81 mg QD, hold for possible central line placement  Continue Bisoprolol 5mg qd d with hold parameter Status post stent x 3 in 9/2019  ASA 81 mg QD on hold for central line placement  Continue Bisoprolol 5mg PO daily

## 2019-09-26 DIAGNOSIS — C93.10 CHRONIC MYELOMONOCYTIC LEUKEMIA NOT HAVING ACHIEVED REMISSION: ICD-10-CM

## 2019-09-26 LAB
ALBUMIN SERPL ELPH-MCNC: 2.9 G/DL — LOW (ref 3.3–5)
ALBUMIN SERPL ELPH-MCNC: 3.6 G/DL — SIGNIFICANT CHANGE UP (ref 3.3–5)
ALP SERPL-CCNC: 120 U/L — SIGNIFICANT CHANGE UP (ref 40–120)
ALP SERPL-CCNC: 141 U/L — HIGH (ref 40–120)
ALT FLD-CCNC: 12 U/L — SIGNIFICANT CHANGE UP (ref 10–45)
ALT FLD-CCNC: 14 U/L — SIGNIFICANT CHANGE UP (ref 10–45)
ANION GAP SERPL CALC-SCNC: 16 MMOL/L — SIGNIFICANT CHANGE UP (ref 5–17)
ANION GAP SERPL CALC-SCNC: 17 MMOL/L — SIGNIFICANT CHANGE UP (ref 5–17)
APTT BLD: 32 SEC — SIGNIFICANT CHANGE UP (ref 27.5–36.3)
AST SERPL-CCNC: 24 U/L — SIGNIFICANT CHANGE UP (ref 10–40)
AST SERPL-CCNC: 26 U/L — SIGNIFICANT CHANGE UP (ref 10–40)
BASOPHILS # BLD AUTO: 0.1 K/UL — SIGNIFICANT CHANGE UP (ref 0–0.2)
BASOPHILS NFR BLD AUTO: 0 % — SIGNIFICANT CHANGE UP (ref 0–2)
BILIRUB SERPL-MCNC: 0.6 MG/DL — SIGNIFICANT CHANGE UP (ref 0.2–1.2)
BILIRUB SERPL-MCNC: 0.7 MG/DL — SIGNIFICANT CHANGE UP (ref 0.2–1.2)
BUN SERPL-MCNC: 37 MG/DL — HIGH (ref 7–23)
BUN SERPL-MCNC: 42 MG/DL — HIGH (ref 7–23)
CALCIUM SERPL-MCNC: 8.5 MG/DL — SIGNIFICANT CHANGE UP (ref 8.4–10.5)
CALCIUM SERPL-MCNC: 8.8 MG/DL — SIGNIFICANT CHANGE UP (ref 8.4–10.5)
CHLORIDE SERPL-SCNC: 103 MMOL/L — SIGNIFICANT CHANGE UP (ref 96–108)
CHLORIDE SERPL-SCNC: 98 MMOL/L — SIGNIFICANT CHANGE UP (ref 96–108)
CHROM ANALY INTERPHASE BLD FISH-IMP: SIGNIFICANT CHANGE UP
CHROM ANALY INTERPHASE BLD FISH-IMP: SIGNIFICANT CHANGE UP
CO2 SERPL-SCNC: 16 MMOL/L — LOW (ref 22–31)
CO2 SERPL-SCNC: 17 MMOL/L — LOW (ref 22–31)
CREAT SERPL-MCNC: 1.58 MG/DL — HIGH (ref 0.5–1.3)
CREAT SERPL-MCNC: 1.63 MG/DL — HIGH (ref 0.5–1.3)
CULTURE RESULTS: NO GROWTH — SIGNIFICANT CHANGE UP
D DIMER BLD IA.RAPID-MCNC: 3465 NG/ML DDU — HIGH
EOSINOPHIL # BLD AUTO: 1.2 K/UL — HIGH (ref 0–0.5)
EOSINOPHIL NFR BLD AUTO: 3 % — SIGNIFICANT CHANGE UP (ref 0–6)
FIBRINOGEN PPP-MCNC: 443 MG/DL — SIGNIFICANT CHANGE UP (ref 350–510)
GLUCOSE BLDC GLUCOMTR-MCNC: 179 MG/DL — HIGH (ref 70–99)
GLUCOSE BLDC GLUCOMTR-MCNC: 182 MG/DL — HIGH (ref 70–99)
GLUCOSE BLDC GLUCOMTR-MCNC: 198 MG/DL — HIGH (ref 70–99)
GLUCOSE BLDC GLUCOMTR-MCNC: 208 MG/DL — HIGH (ref 70–99)
GLUCOSE BLDC GLUCOMTR-MCNC: 227 MG/DL — HIGH (ref 70–99)
GLUCOSE SERPL-MCNC: 166 MG/DL — HIGH (ref 70–99)
GLUCOSE SERPL-MCNC: 216 MG/DL — HIGH (ref 70–99)
HBV DNA # SERPL NAA+PROBE: SIGNIFICANT CHANGE UP
HBV DNA SERPL NAA+PROBE-LOG#: SIGNIFICANT CHANGE UP LOGIU/ML
HCT VFR BLD CALC: 24.6 % — LOW (ref 39–50)
HGB BLD-MCNC: 7.2 G/DL — LOW (ref 13–17)
INR BLD: 1.13 RATIO — SIGNIFICANT CHANGE UP (ref 0.88–1.16)
LDH SERPL L TO P-CCNC: 1852 U/L — HIGH (ref 50–242)
LDH SERPL L TO P-CCNC: 1920 U/L — HIGH (ref 50–242)
LYMPHOCYTES # BLD AUTO: 2.8 K/UL — SIGNIFICANT CHANGE UP (ref 1–3.3)
LYMPHOCYTES # BLD AUTO: 3 % — LOW (ref 13–44)
MAGNESIUM SERPL-MCNC: 2 MG/DL — SIGNIFICANT CHANGE UP (ref 1.6–2.6)
MAGNESIUM SERPL-MCNC: 2.1 MG/DL — SIGNIFICANT CHANGE UP (ref 1.6–2.6)
MCHC RBC-ENTMCNC: 27.2 PG — SIGNIFICANT CHANGE UP (ref 27–34)
MCHC RBC-ENTMCNC: 29.2 GM/DL — LOW (ref 32–36)
MCV RBC AUTO: 93.2 FL — SIGNIFICANT CHANGE UP (ref 80–100)
MONOCYTES # BLD AUTO: 20 K/UL — HIGH (ref 0–0.9)
MONOCYTES NFR BLD AUTO: 23 % — HIGH (ref 2–14)
NEUTROPHILS # BLD AUTO: 47.5 K/UL — HIGH (ref 1.8–7.4)
NEUTROPHILS NFR BLD AUTO: 57 % — SIGNIFICANT CHANGE UP (ref 43–77)
PHOSPHATE SERPL-MCNC: 5.5 MG/DL — HIGH (ref 2.5–4.5)
PHOSPHATE SERPL-MCNC: 6.2 MG/DL — HIGH (ref 2.5–4.5)
PLATELET # BLD AUTO: 80 K/UL — LOW (ref 150–400)
POTASSIUM SERPL-MCNC: 4.6 MMOL/L — SIGNIFICANT CHANGE UP (ref 3.5–5.3)
POTASSIUM SERPL-MCNC: 4.6 MMOL/L — SIGNIFICANT CHANGE UP (ref 3.5–5.3)
POTASSIUM SERPL-SCNC: 4.6 MMOL/L — SIGNIFICANT CHANGE UP (ref 3.5–5.3)
POTASSIUM SERPL-SCNC: 4.6 MMOL/L — SIGNIFICANT CHANGE UP (ref 3.5–5.3)
PROT SERPL-MCNC: 5.5 G/DL — LOW (ref 6–8.3)
PROT SERPL-MCNC: 6.5 G/DL — SIGNIFICANT CHANGE UP (ref 6–8.3)
PROTHROM AB SERPL-ACNC: 13.1 SEC — HIGH (ref 10–12.9)
RBC # BLD: 2.64 M/UL — LOW (ref 4.2–5.8)
RBC # FLD: 18.3 % — HIGH (ref 10.3–14.5)
SODIUM SERPL-SCNC: 132 MMOL/L — LOW (ref 135–145)
SODIUM SERPL-SCNC: 135 MMOL/L — SIGNIFICANT CHANGE UP (ref 135–145)
SPECIMEN SOURCE: SIGNIFICANT CHANGE UP
URATE SERPL-MCNC: 3.8 MG/DL — SIGNIFICANT CHANGE UP (ref 3.4–8.8)
URATE SERPL-MCNC: 4.1 MG/DL — SIGNIFICANT CHANGE UP (ref 3.4–8.8)
WBC # BLD: 71.7 K/UL — CRITICAL HIGH (ref 3.8–10.5)
WBC # FLD AUTO: 71.7 K/UL — CRITICAL HIGH (ref 3.8–10.5)

## 2019-09-26 PROCEDURE — 99232 SBSQ HOSP IP/OBS MODERATE 35: CPT

## 2019-09-26 PROCEDURE — 99232 SBSQ HOSP IP/OBS MODERATE 35: CPT | Mod: GC

## 2019-09-26 RX ORDER — CALCIUM ACETATE 667 MG
667 TABLET ORAL
Refills: 0 | Status: COMPLETED | OUTPATIENT
Start: 2019-09-26 | End: 2019-09-27

## 2019-09-26 RX ORDER — PRAMOXINE HYDROCHLORIDE 150 MG/15G
1 AEROSOL, FOAM RECTAL
Qty: 0 | Refills: 0 | DISCHARGE
Start: 2019-09-26

## 2019-09-26 RX ORDER — PETROLATUM,WHITE
1 JELLY (GRAM) TOPICAL
Qty: 0 | Refills: 0 | DISCHARGE
Start: 2019-09-26

## 2019-09-26 RX ORDER — HYDROXYUREA 500 MG/1
1 CAPSULE ORAL
Qty: 60 | Refills: 3
Start: 2019-09-26 | End: 2020-01-23

## 2019-09-26 RX ORDER — TENOFOVIR DISOPROXIL FUMARATE 300 MG/1
1 TABLET, FILM COATED ORAL
Qty: 30 | Refills: 0
Start: 2019-09-26 | End: 2019-10-25

## 2019-09-26 RX ORDER — CILOSTAZOL 100 MG/1
1 TABLET ORAL
Qty: 0 | Refills: 0 | DISCHARGE

## 2019-09-26 RX ORDER — PRAMOXINE HYDROCHLORIDE 150 MG/15G
1 AEROSOL, FOAM RECTAL
Qty: 1 | Refills: 0
Start: 2019-09-26 | End: 2019-10-05

## 2019-09-26 RX ORDER — TENOFOVIR DISOPROXIL FUMARATE 300 MG/1
1 TABLET, FILM COATED ORAL
Qty: 15 | Refills: 0
Start: 2019-09-26 | End: 2019-10-25

## 2019-09-26 RX ADMIN — ZOLPIDEM TARTRATE 5 MILLIGRAM(S): 10 TABLET ORAL at 22:00

## 2019-09-26 RX ADMIN — HYDROXYUREA 1000 MILLIGRAM(S): 500 CAPSULE ORAL at 14:33

## 2019-09-26 RX ADMIN — SIMVASTATIN 10 MILLIGRAM(S): 20 TABLET, FILM COATED ORAL at 22:01

## 2019-09-26 RX ADMIN — Medication 667 MILLIGRAM(S): at 17:46

## 2019-09-26 RX ADMIN — Medication 1: at 08:05

## 2019-09-26 RX ADMIN — FINASTERIDE 5 MILLIGRAM(S): 5 TABLET, FILM COATED ORAL at 12:06

## 2019-09-26 RX ADMIN — HYDROXYUREA 1000 MILLIGRAM(S): 500 CAPSULE ORAL at 22:01

## 2019-09-26 RX ADMIN — PRAMOXINE HYDROCHLORIDE 1 APPLICATION(S): 150 AEROSOL, FOAM RECTAL at 17:41

## 2019-09-26 RX ADMIN — MORPHINE SULFATE 2 MILLIGRAM(S): 50 CAPSULE, EXTENDED RELEASE ORAL at 22:03

## 2019-09-26 RX ADMIN — Medication 5 MILLILITER(S): at 16:39

## 2019-09-26 RX ADMIN — HYDROXYUREA 1000 MILLIGRAM(S): 500 CAPSULE ORAL at 06:34

## 2019-09-26 RX ADMIN — SODIUM CHLORIDE 75 MILLILITER(S): 9 INJECTION INTRAMUSCULAR; INTRAVENOUS; SUBCUTANEOUS at 17:42

## 2019-09-26 RX ADMIN — Medication 1: at 14:31

## 2019-09-26 RX ADMIN — Medication 100 MILLIGRAM(S): at 14:32

## 2019-09-26 RX ADMIN — Medication 1 APPLICATION(S): at 17:42

## 2019-09-26 RX ADMIN — Medication 5 MILLILITER(S): at 08:03

## 2019-09-26 RX ADMIN — Medication 5 MILLIGRAM(S): at 22:01

## 2019-09-26 RX ADMIN — TAMSULOSIN HYDROCHLORIDE 0.4 MILLIGRAM(S): 0.4 CAPSULE ORAL at 22:01

## 2019-09-26 RX ADMIN — MORPHINE SULFATE 2 MILLIGRAM(S): 50 CAPSULE, EXTENDED RELEASE ORAL at 20:41

## 2019-09-26 RX ADMIN — Medication 2: at 17:39

## 2019-09-26 RX ADMIN — Medication 1 APPLICATION(S): at 06:35

## 2019-09-26 RX ADMIN — Medication 100 MILLIGRAM(S): at 12:06

## 2019-09-26 RX ADMIN — Medication 1 APPLICATION(S): at 06:36

## 2019-09-26 RX ADMIN — Medication 100 MILLIGRAM(S): at 22:01

## 2019-09-26 RX ADMIN — Medication 5 MILLILITER(S): at 20:43

## 2019-09-26 RX ADMIN — PRAMOXINE HYDROCHLORIDE 1 APPLICATION(S): 150 AEROSOL, FOAM RECTAL at 06:35

## 2019-09-26 RX ADMIN — Medication 100 MILLIGRAM(S): at 20:42

## 2019-09-26 RX ADMIN — Medication 100 MILLIGRAM(S): at 06:34

## 2019-09-26 RX ADMIN — Medication 667 MILLIGRAM(S): at 22:01

## 2019-09-26 RX ADMIN — BISOPROLOL FUMARATE 5 MILLIGRAM(S): 10 TABLET, FILM COATED ORAL at 06:34

## 2019-09-26 RX ADMIN — Medication 1 APPLICATION(S): at 17:41

## 2019-09-26 RX ADMIN — CHLORHEXIDINE GLUCONATE 1 APPLICATION(S): 213 SOLUTION TOPICAL at 08:04

## 2019-09-26 RX ADMIN — AMLODIPINE BESYLATE 5 MILLIGRAM(S): 2.5 TABLET ORAL at 06:34

## 2019-09-26 RX ADMIN — Medication 667 MILLIGRAM(S): at 12:06

## 2019-09-26 RX ADMIN — Medication 5 MILLILITER(S): at 12:06

## 2019-09-26 NOTE — PROGRESS NOTE ADULT - PROBLEM SELECTOR PLAN 8
Chronic rash to upper back and feet improving  No primary lesions appreciated on exam, all appear secondary to scratching  Can be 2/2 underlying malignancy, CKD or obstructive process if the liver  Continue Vaseline and Triamcinolone ointment and Sarna cream   Dermatology following

## 2019-09-26 NOTE — PROGRESS NOTE ADULT - PROBLEM SELECTOR PLAN 2
The patient is not neutropenic, afebrile  If febrile Pan Cx and CXR  Continue Levaquin, if decompensates start Cefepime  Hep B Core (+) will start Viread and follow up PCR  Follow up QuantiFeron Gold The patient is not neutropenic, afebrile  If febrile Pan Cx and CXR  Continue Levaquin, if decompensates start Cefepime  Hep B Core (+) continue Viread and follow up PCR  Follow up QuantiFeron Gold The patient is not neutropenic, afebrile  If febrile Pan Cx and CXR  Continue Levaquin, if decompensates start Cefepime  Hep B Core (+) continue Viread, PCR (-)  Follow up QuantiFeron Gold

## 2019-09-26 NOTE — PROGRESS NOTE ADULT - PROBLEM SELECTOR PLAN 6
Pletal on hold for Mediport placement  Will potentially restart 24 hours after unless thrombocytopenia continues to worsen

## 2019-09-26 NOTE — PROGRESS NOTE ADULT - ATTENDING COMMENTS
83 yo M Mandarin-speaking, p/w leukocytosis also with lung masses ? 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 Cont Hydrea, will try to decrease wbc count with it while awaiting further planning -pt also has a second malignancy, likely metastatic lung CA with liver mets given long hx smoking  -as planned, will increase Hydrea to 1gram q8hrs given wbc still high; will check labs 2x/day  -monitor for tumor lysis syndrome -needs IVF with close monitoring for pulm edema. Allopurinol  -hx CAD and stents -on ASA 81mg as long as plt>50. Echo -EF 65%  -hep B -core Ab +, will check PCR and start Virea. Hep C, and HIV nonreactive  -awaiting Port placement prior to chemo and liver bx. CT A/P done -  -physical therapy eval 85 yo M Mandarin-speaking, p/w leukocytosis also with lung masses ? 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 Cont Hydrea, wbc improved. Awaiting liver bx -pt a second malignancy, likely metastatic lung CA with liver mets given long hx smoking  -cont to check labs 2x/day  -monitor for tumor lysis syndrome -needs IVF with close monitoring for pulm edema. Allopurinol  -hx CAD and stents -on ASA 81mg as long as plt>50. Echo -EF 65%  -hep B -core Ab +,  PCR pending, cont Virea. Hep C, and HIV nonreactive. Quantiferron gold -pending  -awaiting Port placement prior to chemo and liver bx. CT A/P done -liver masses, splenic infarcts  -physical therapy eval -pt walked 50 ft

## 2019-09-26 NOTE — PROGRESS NOTE ADULT - ASSESSMENT
84/M with PMH DM2, HTN, CVA, CAD s/p stent, PVD, BPH, current heavy smoker, newly diagnosed Lung ca w/ liver mets (no treatment received), chronic leukocytosis under evaluation. Recent travel to [facility] 1 week prior to admission. P/w progressively worsening SOB, generalized weakness, fevers, chills, LE edema, nonproductive cough, gradual weight loss over the past year.  - Found to have leukocytosis to 129,000 with 20% blasts, 11% metamyelocytes, elevated LDH, elevated BNP  - Imaging concerning for lung mass, metastases to c/l lung, liver, spleen, intra-abdominal LNs  - ID consulted for fevers  -----------  At this time, there is low suspicion for infectious cause of fevers and leucocytosis. This could be 2/2 underlying malignancies  - RVP negative, UA negative, Urine Legionella negative, HIV negative. Urine Cx negative, Blood Cx negative till date. Imaging does not show any obvious source of infection  - Patient has granulomas in both lung fields. Family reported negative PPD testing in past (during immigration to ).  - HBcAb positive, HBsAb indeterminate: could be old resolved infection.    Past Abx: Vanc 9/22  Zosyn 9/22-9/24  Fluconazole 9/23-  Levofloxacin 9/25-    Overall, Fever, Leukocytosis, Leukemia, Lung Cancer, Lung Granuloma, HBV Infection    RECOMMENDATIONS:    #Fevers, leucocytosis  --Continue prophylactic antibiotics per Oncology  --If clinical change recommend 2x blood cultures and switching Levofloxacin to Cefepime 2g IV q12h  --No absolute contraindication from ID standpoint for chest port placement  --Continue to follow CBC with diff  --Continue to follow temperature curve  --Follow up on preliminary blood cultures    #Lung Granulomas noted in CT Chest  --Family reported negative PPD testing in past (during immigration to ).  --Follow up on Quant Gold    #Positive Hep B core antibody  --HBcAb positive, HBsAb indeterminate: could be old resolved infection.  --F/U HBV PCR    I will continue to follow. Please feel free to contact me with any further questions.    Anatoliy Parham M.D.  [facility] Division of Infectious Disease  8AM-5PM: Pager Number [phone]  After Hours (or if no response): Please contact the Infectious Diseases Office at [phone]

## 2019-09-26 NOTE — PROGRESS NOTE ADULT - PROBLEM SELECTOR PLAN 9
No pharmacologic ppx 2/2 port placement and worsening thrombocytopenia  Encourage ambulation      Contact info [phone]

## 2019-09-26 NOTE — PROGRESS NOTE ADULT - SUBJECTIVE AND OBJECTIVE BOX
Follow Up:  Fever    Interval History: No chills or fevers overnight. Notes some pain in the LUQ. No N/V/D.     REVIEW OF SYSTEMS  [  ] ROS unobtainable because:    [ x ] All other systems negative except as noted below    Constitutional:  [ ] fever [ ] chills  [ ] weight loss  [ ] weakness  Skin:  [ ] rash [ ] phlebitis	  Eyes: [ ] icterus [ ] pain  [ ] discharge	  ENMT: [ ] sore throat  [ ] thrush [ ] ulcers [ ] exudates  Respiratory: [ ] dyspnea [ ] hemoptysis [ x] cough [ ] sputum	  Cardiovascular:  [ ] chest pain [ ] palpitations [ ] edema	  Gastrointestinal:  [ ] nausea [ ] vomiting [ ] diarrhea [ ] constipation [x ] pain	  Genitourinary:  [ ] dysuria [ ] frequency [ ] hematuria [ ] discharge [ ] flank pain  [ ] incontinence  Musculoskeletal:  [ ] myalgias [ ] arthralgias [ ] arthritis  [ ] back pain  Neurological:  [ ] headache [ ] seizures  [ ] confusion/altered mental status    Allergies  No Known Allergies        ANTIMICROBIALS:  levoFLOXacin  Tablet 250 every 24 hours  tenofovir disoproxil fumarate (VIREAD) 300 <User Schedule>      OTHER MEDS:  MEDICATIONS  (STANDING):  acetaminophen   Tablet .. 650 every 6 hours PRN  allopurinol 100 daily  amLODIPine   Tablet 5 daily  benzonatate 100 three times a day  bisoprolol   Tablet 5 daily  dextrose 40% Gel 15 once PRN  dextrose 50% Injectable 12.5 once  dextrose 50% Injectable 25 once  dextrose 50% Injectable 25 once  docusate sodium 100 three times a day  finasteride 5 daily  glucagon  Injectable 1 once PRN  hydroxyurea 1000 every 8 hours  influenza   Vaccine 0.5 once  insulin lispro (HumaLOG) corrective regimen sliding scale  three times a day before meals  insulin lispro (HumaLOG) corrective regimen sliding scale  at bedtime  melatonin 5 at bedtime  morphine  - Injectable 2 every 4 hours PRN  simvastatin 10 at bedtime  tamsulosin 0.4 at bedtime  zolpidem 5 at bedtime PRN      Vital Signs Last 24 Hrs  T(C): 36.7 (26 Sep 2019 17:00), Max: 37.2 (25 Sep 2019 21:07)  T(F): 98.1 (26 Sep 2019 17:00), Max: 98.9 (25 Sep 2019 21:07)  HR: 73 (26 Sep 2019 17:00) (73 - 82)  BP: 117/58 (26 Sep 2019 17:00) (100/53 - 117/58)  BP(mean): --  RR: 18 (26 Sep 2019 17:00) (16 - 18)  SpO2: 99% (26 Sep 2019 17:00) (95% - 99%)    PHYSICAL EXAMINATION:  General: Alert and Awake, NAD  HEENT: PERRL, EOMI  Neck: Supple  Cardiac: RRR, No M/R/G  Resp: CTAB, No Wh/Rh/Ra  Abdomen: NBS, NT/ND, No HSM, No rigidity or guarding  MSK: No LE edema. No Calf tenderness  : No sandoval  Skin: No rashes or lesions. Skin is warm and dry to the touch.   Neuro: Alert and Awake. CN 2-12 Grossly intact. Moves all four extremities spontaneously.  Psych: Calm, Pleasant, Cooperative                          7.2    71.7  )-----------( 80       ( 26 Sep 2019 07:01 )             24.6           132<L>  |  98  |  42<H>  ----------------------------<  216<H>  4.6   |  17<L>  |  1.63<H>    Ca    8.8      26 Sep 2019 17:00  Phos  6.2       Mg     2.1         TPro  6.5  /  Alb  3.6  /  TBili  0.7  /  DBili  x   /  AST  24  /  ALT  14  /  AlkPhos  141<H>        Urinalysis Basic - ( 25 Sep 2019 15:11 )    Color: Yellow / Appearance: Slightly Turbid / S.030 / pH: x  Gluc: x / Ketone: Negative  / Bili: Negative / Urobili: <2 mg/dL   Blood: x / Protein: 100 mg/dL / Nitrite: Negative   Leuk Esterase: Negative / RBC: 2 /HPF / WBC 7 /HPF   Sq Epi: x / Non Sq Epi: 14 /HPF / Bacteria: Occasional        MICROBIOLOGY:  v  .Urine  19   No growth  --  --      .Blood  19   No growth to date.  --  --      .Urine  19   No growth  --  --      .Blood  19   No growth to date.  --  --          Rapid RVP Result: NotDetec ( @ 11:19)        RADIOLOGY:    EXAM:  CT ABDOMEN AND PELVIS OC IC                        PROCEDURE DATE:  2019    Hepatomegaly, with bilobar hypodense lesions, suspicious for metastatic disease.  Splenomegaly, with multiple splenic infarcts.  Retroperitoneal lymphadenopathy.  Enlarged prostate gland.  A 4.8 cm infrarenal abdominal aortic aneurysm, status post bifurcated aortic stent graft.    EXAM:  CT ANGIO CHEST (W)AW IC                        PROCEDURE DATE:  2019    1.  No central pulmonary embolus. The subsegmental branches are not evaluated secondary to respiratory motion.  2.  4.3 x 3.4 cm mass posterior left upper lobe. 1.3 cm anterior right upper lobe nodule. These are likely neoplastic.  3.  Multiple hypodense lesions within the liver likely metastatic.  4.  Splenomegaly. Peripheral hypodensities within the spleen may represent infarct or metastatic lesions or combination of both.

## 2019-09-26 NOTE — PROGRESS NOTE ADULT - PROBLEM SELECTOR PLAN 4
Status post stent x 3 in 9/2019  ASA 81 mg QD on hold for central line placement  Continue Bisoprolol 5mg PO daily

## 2019-09-26 NOTE — PROGRESS NOTE ADULT - PROBLEM SELECTOR PLAN 1
9/23 status post BM bx, follow up results  Monitor CBC/Lytes and transfuse/replete PRN  TLS labs q 12  Strict Is and Os/daily weights/Mouth Care  Antiemetics   IVF  Continue Allopurinol 300 mg daily  9/27 For Mediport placement 9/23 Status post BM bx  Continue Hydrea  Monitor CBC/Lytes and transfuse/replete PRN  TLS labs q 12  Strict Is and Os/daily weights/Mouth Care  Antiemetics   IVF  Continue Allopurinol 300 mg daily  9/27 For Mediport placement

## 2019-09-26 NOTE — PROGRESS NOTE ADULT - SUBJECTIVE AND OBJECTIVE BOX
Diagnosis: Acute Leukemia    Protocol/Chemo Regimen: TBD    Day: N/A     Pt endorsed: rash to back and abdominal pain both continue to improved    Review of Systems: Patient denies headache, dizziness, visual changes, chest pain, palpitations, SOB, abdominal pain, nausea, vomiting, diarrhea or dysuria.    Pain scale:                                    Diet: Consistent Carb    Allergies: No Known Allergies      ANTIMICROBIALS  levoFLOXacin  Tablet 500 milliGRAM(s) Oral every 24 hours      HEME/ONC MEDICATIONS  hydroxyurea 500 milliGRAM(s) Oral every 8 hours      STANDING MEDICATIONS  allopurinol 300 milliGRAM(s) Oral daily  amLODIPine   Tablet 5 milliGRAM(s) Oral daily  AQUAPHOR (petrolatum Ointment) 1 Application(s) Topical two times a day  Biotene Dry Mouth Oral Rinse 5 milliLiter(s) Swish and Spit five times a day  bisoprolol   Tablet 5 milliGRAM(s) Oral daily  calcium acetate 667 milliGRAM(s) Oral four times a day with meals  camphor 0.5%/menthol 0.5% Topical Lotion 1 Application(s) Topical two times a day  dextrose 5%. 1000 milliLiter(s) IV Continuous <Continuous>  dextrose 50% Injectable 12.5 Gram(s) IV Push once  dextrose 50% Injectable 25 Gram(s) IV Push once  dextrose 50% Injectable 25 Gram(s) IV Push once  docusate sodium 100 milliGRAM(s) Oral three times a day  finasteride 5 milliGRAM(s) Oral daily  influenza   Vaccine 0.5 milliLiter(s) IntraMuscular once  insulin lispro (HumaLOG) corrective regimen sliding scale   SubCutaneous three times a day before meals  insulin lispro (HumaLOG) corrective regimen sliding scale   SubCutaneous at bedtime  lidocaine 2% Injectable 1 Vial(s) Local Injection once  melatonin 5 milliGRAM(s) Oral at bedtime  nicotine - 21 mG/24Hr(s) Patch 1 patch Transdermal daily  simvastatin 10 milliGRAM(s) Oral at bedtime  sodium chloride 0.9%. 1000 milliLiter(s) IV Continuous <Continuous>  tamsulosin 0.4 milliGRAM(s) Oral at bedtime  triamcinolone 0.1% Ointment 1 Application(s) Topical two times a day      PRN MEDICATIONS  acetaminophen   Tablet .. 650 milliGRAM(s) Oral every 6 hours PRN  dextrose 40% Gel 15 Gram(s) Oral once PRN  glucagon  Injectable 1 milliGRAM(s) IntraMuscular once PRN  morphine  - Injectable 2 milliGRAM(s) IV Push every 4 hours PRN  zolpidem 5 milliGRAM(s) Oral at bedtime PRN      Vital Signs Last 24 Hrs  T(C): 37.1 (26 Sep 2019 05:15), Max: 38 (25 Sep 2019 09:45)  T(F): 98.8 (26 Sep 2019 05:15), Max: 100.4 (25 Sep 2019 09:45)  HR: 82 (26 Sep 2019 05:15) (67 - 82)  BP: 114/89 (26 Sep 2019 05:15) (94/63 - 114/89)  BP(mean): --  RR: 18 (26 Sep 2019 05:15) (16 - 18)  SpO2: 95% (26 Sep 2019 05:15) (95% - 99%)        PHYSICAL EXAM  General: NAD  HEENT: PERRLA, EOMI, clear oropharynx  Neck: supple  CV: (+) S1/S2 RRR  Lungs: clear to auscultation, no wheezes or rales  Abdomen: soft, mildly distended with some pain to palpation (+) BS  Ext: no edema  Skin: hyperpigmented rash to back and areas of petechia scattered  Neuro: alert and oriented X 3, no focal deficits  PIV: C/D/I         LABS:                        7.8    109.0 )-----------( 92       ( 25 Sep 2019 07:13 )             27.0         Mean Cell Volume : 93.1 fl  Mean Cell Hemoglobin : 27.0 pg  Mean Cell Hemoglobin Concentration : 29.0 gm/dL  Auto Neutrophil # : See Note  Auto Lymphocyte # : 5.7 K/uL  Auto Monocyte # : 27.8 K/uL  Auto Eosinophil # : 1.4 K/uL  Auto Basophil # : 0.1 K/uL  Auto Neutrophil % : 48.0 %  Auto Lymphocyte % : 3.0 %  Auto Monocyte % : 31.0 %  Auto Eosinophil % : 1.0 %  Auto Basophil % : 0.0 %      09-25    131<L>  |  96  |  38<H>  ----------------------------<  154<H>  4.2   |  18<L>  |  1.64<H>    Ca    8.5      25 Sep 2019 19:35  Phos  5.8     09-25  Mg     1.9     09-25    TPro  5.8<L>  /  Alb  3.1<L>  /  TBili  0.6  /  DBili  x   /  AST  28  /  ALT  15  /  AlkPhos  129<H>  09-25      Mg 1.9  Phos 5.8      PT/INR - ( 25 Sep 2019 07:13 )   PT: 14.1 sec;   INR: 1.23 ratio         PTT - ( 25 Sep 2019 07:13 )  PTT:33.2 sec    LDH 1987  Uric Acid 3.7        RECENT CULTURES:    Culture - Urine (09.23.19 @ 09:55)    Specimen Source: .Urine    Culture Results:   No growth    Culture - Blood (09.23.19 @ 01:11)    Specimen Source: .Blood    Culture Results:   No growth to date.        RADIOLOGY & ADDITIONAL STUDIES:    EXAM:  CT ABDOMEN AND PELVIS OC IC                        PROCEDURE DATE:  09/24/2019    IMPRESSION: Hepatomegaly, with bilobar hypodense lesions, suspicious for metastatic   disease. Splenomegaly, with multiple splenic infarcts. Retroperitoneal lymphadenopathy.  Enlarged prostate gland. A 4.8 cm infrarenal abdominal aortic aneurysm, status post bifurcated   aortic stent graft.            EXAM:  CT ANGIO CHEST (W)AW IC                        PROCEDURE DATE:  09/23/2019    IMPRESSION: No central pulmonary embolus. The subsegmental branches are not   evaluated secondary to respiratory motion. 4.3 x 3.4 cm mass posterior left upper lobe. 1.3 cm anterior right   upper lobe nodule. These are likely neoplastic. Multiple hypodense lesions within the liver likely metastatic. Splenomegaly.  Peripheral hypodensities within the spleen may   represent infarct or metastatic lesions or combination of both. Diagnosis: Acute Leukemia    Protocol/Chemo Regimen: TBD     ID: [id number]    Day: N/A     Pt endorsed: rash to back and abdominal pain both continue to improved    Review of Systems: Patient denies headache, dizziness, visual changes, chest pain, palpitations, SOB, abdominal pain, nausea, vomiting, diarrhea or dysuria.    Pain scale:                                    Diet: Consistent Carb    Allergies: No Known Allergies      ANTIMICROBIALS  levoFLOXacin  Tablet 500 milliGRAM(s) Oral every 24 hours      HEME/ONC MEDICATIONS  hydroxyurea 500 milliGRAM(s) Oral every 8 hours      STANDING MEDICATIONS  allopurinol 300 milliGRAM(s) Oral daily  amLODIPine   Tablet 5 milliGRAM(s) Oral daily  AQUAPHOR (petrolatum Ointment) 1 Application(s) Topical two times a day  Biotene Dry Mouth Oral Rinse 5 milliLiter(s) Swish and Spit five times a day  bisoprolol   Tablet 5 milliGRAM(s) Oral daily  calcium acetate 667 milliGRAM(s) Oral four times a day with meals  camphor 0.5%/menthol 0.5% Topical Lotion 1 Application(s) Topical two times a day  dextrose 5%. 1000 milliLiter(s) IV Continuous <Continuous>  dextrose 50% Injectable 12.5 Gram(s) IV Push once  dextrose 50% Injectable 25 Gram(s) IV Push once  dextrose 50% Injectable 25 Gram(s) IV Push once  docusate sodium 100 milliGRAM(s) Oral three times a day  finasteride 5 milliGRAM(s) Oral daily  influenza   Vaccine 0.5 milliLiter(s) IntraMuscular once  insulin lispro (HumaLOG) corrective regimen sliding scale   SubCutaneous three times a day before meals  insulin lispro (HumaLOG) corrective regimen sliding scale   SubCutaneous at bedtime  lidocaine 2% Injectable 1 Vial(s) Local Injection once  melatonin 5 milliGRAM(s) Oral at bedtime  nicotine - 21 mG/24Hr(s) Patch 1 patch Transdermal daily  simvastatin 10 milliGRAM(s) Oral at bedtime  sodium chloride 0.9%. 1000 milliLiter(s) IV Continuous <Continuous>  tamsulosin 0.4 milliGRAM(s) Oral at bedtime  triamcinolone 0.1% Ointment 1 Application(s) Topical two times a day      PRN MEDICATIONS  acetaminophen   Tablet .. 650 milliGRAM(s) Oral every 6 hours PRN  dextrose 40% Gel 15 Gram(s) Oral once PRN  glucagon  Injectable 1 milliGRAM(s) IntraMuscular once PRN  morphine  - Injectable 2 milliGRAM(s) IV Push every 4 hours PRN  zolpidem 5 milliGRAM(s) Oral at bedtime PRN      Vital Signs Last 24 Hrs  T(C): 37.1 (26 Sep 2019 05:15), Max: 38 (25 Sep 2019 09:45)  T(F): 98.8 (26 Sep 2019 05:15), Max: 100.4 (25 Sep 2019 09:45)  HR: 82 (26 Sep 2019 05:15) (67 - 82)  BP: 114/89 (26 Sep 2019 05:15) (94/63 - 114/89)  BP(mean): --  RR: 18 (26 Sep 2019 05:15) (16 - 18)  SpO2: 95% (26 Sep 2019 05:15) (95% - 99%)        PHYSICAL EXAM  General: NAD  HEENT: PERRLA, EOMI, clear oropharynx  Neck: supple  CV: (+) S1/S2 RRR  Lungs: clear to auscultation, no wheezes or rales  Abdomen: soft, mildly distended with some pain to palpation (+) BS  Ext: no edema  Skin: hyperpigmented rash to back and areas of petechia scattered  Neuro: alert and oriented X 3, no focal deficits  PIV: C/D/I         LABS:                                 7.2    71.7  )-----------( 80       ( 26 Sep 2019 07:01 )             24.6         Mean Cell Volume : 93.2 fl  Mean Cell Hemoglobin : 27.2 pg  Mean Cell Hemoglobin Concentration : 29.2 gm/dL  Auto Neutrophil # : 47.5 K/uL  Auto Lymphocyte # : 2.8 K/uL  Auto Monocyte # : 20.0 K/uL  Auto Eosinophil # : 1.2 K/uL  Auto Basophil # : 0.1 K/uL  Auto Neutrophil % : 57.0 %  Auto Lymphocyte % : 3.0 %  Auto Monocyte % : 23.0 %  Auto Eosinophil % : 3.0 %  Auto Basophil % : 0.0 %      09-26    135  |  103  |  37<H>  ----------------------------<  166<H>  4.6   |  16<L>  |  1.58<H>    Ca    8.5      26 Sep 2019 07:01  Phos  5.5     09-26  Mg     2.0     09-26    TPro  5.5<L>  /  Alb  2.9<L>  /  TBili  0.6  /  DBili  x   /  AST  26  /  ALT  12  /  AlkPhos  120  09-26      Mg 2.0  Phos 5.5    PT/INR - ( 26 Sep 2019 07:01 )   PT: 13.1 sec;   INR: 1.13 ratio         PTT - ( 26 Sep 2019 07:01 )  PTT:32.0 sec    LDH 1852  Uric Acid 3.8        RECENT CULTURES:    Culture - Urine (09.23.19 @ 09:55)    Specimen Source: .Urine    Culture Results:   No growth    Culture - Blood (09.23.19 @ 01:11)    Specimen Source: .Blood    Culture Results:   No growth to date.        RADIOLOGY & ADDITIONAL STUDIES:    EXAM:  CT ABDOMEN AND PELVIS OC IC                        PROCEDURE DATE:  09/24/2019    IMPRESSION: Hepatomegaly, with bilobar hypodense lesions, suspicious for metastatic   disease. Splenomegaly, with multiple splenic infarcts. Retroperitoneal lymphadenopathy.  Enlarged prostate gland. A 4.8 cm infrarenal abdominal aortic aneurysm, status post bifurcated   aortic stent graft.            EXAM:  CT ANGIO CHEST (W)AW IC                        PROCEDURE DATE:  09/23/2019    IMPRESSION: No central pulmonary embolus. The subsegmental branches are not   evaluated secondary to respiratory motion. 4.3 x 3.4 cm mass posterior left upper lobe. 1.3 cm anterior right   upper lobe nodule. These are likely neoplastic. Multiple hypodense lesions within the liver likely metastatic. Splenomegaly.  Peripheral hypodensities within the spleen may   represent infarct or metastatic lesions or combination of both. Diagnosis: CMMoL    Protocol/Chemo Regimen: TBD     ID: [id number]    Day: N/A     Pt endorsed: rash to back and abdominal pain both continue to improved    Review of Systems: Patient denies headache, dizziness, visual changes, chest pain, palpitations, SOB, abdominal pain, nausea, vomiting, diarrhea or dysuria.    Pain scale:                                    Diet: Consistent Carb    Allergies: No Known Allergies      ANTIMICROBIALS  levoFLOXacin  Tablet 500 milliGRAM(s) Oral every 24 hours      HEME/ONC MEDICATIONS  hydroxyurea 500 milliGRAM(s) Oral every 8 hours      STANDING MEDICATIONS  allopurinol 300 milliGRAM(s) Oral daily  amLODIPine   Tablet 5 milliGRAM(s) Oral daily  AQUAPHOR (petrolatum Ointment) 1 Application(s) Topical two times a day  Biotene Dry Mouth Oral Rinse 5 milliLiter(s) Swish and Spit five times a day  bisoprolol   Tablet 5 milliGRAM(s) Oral daily  calcium acetate 667 milliGRAM(s) Oral four times a day with meals  camphor 0.5%/menthol 0.5% Topical Lotion 1 Application(s) Topical two times a day  dextrose 5%. 1000 milliLiter(s) IV Continuous <Continuous>  dextrose 50% Injectable 12.5 Gram(s) IV Push once  dextrose 50% Injectable 25 Gram(s) IV Push once  dextrose 50% Injectable 25 Gram(s) IV Push once  docusate sodium 100 milliGRAM(s) Oral three times a day  finasteride 5 milliGRAM(s) Oral daily  influenza   Vaccine 0.5 milliLiter(s) IntraMuscular once  insulin lispro (HumaLOG) corrective regimen sliding scale   SubCutaneous three times a day before meals  insulin lispro (HumaLOG) corrective regimen sliding scale   SubCutaneous at bedtime  lidocaine 2% Injectable 1 Vial(s) Local Injection once  melatonin 5 milliGRAM(s) Oral at bedtime  nicotine - 21 mG/24Hr(s) Patch 1 patch Transdermal daily  simvastatin 10 milliGRAM(s) Oral at bedtime  sodium chloride 0.9%. 1000 milliLiter(s) IV Continuous <Continuous>  tamsulosin 0.4 milliGRAM(s) Oral at bedtime  triamcinolone 0.1% Ointment 1 Application(s) Topical two times a day      PRN MEDICATIONS  acetaminophen   Tablet .. 650 milliGRAM(s) Oral every 6 hours PRN  dextrose 40% Gel 15 Gram(s) Oral once PRN  glucagon  Injectable 1 milliGRAM(s) IntraMuscular once PRN  morphine  - Injectable 2 milliGRAM(s) IV Push every 4 hours PRN  zolpidem 5 milliGRAM(s) Oral at bedtime PRN      Vital Signs Last 24 Hrs  T(C): 37.1 (26 Sep 2019 05:15), Max: 38 (25 Sep 2019 09:45)  T(F): 98.8 (26 Sep 2019 05:15), Max: 100.4 (25 Sep 2019 09:45)  HR: 82 (26 Sep 2019 05:15) (67 - 82)  BP: 114/89 (26 Sep 2019 05:15) (94/63 - 114/89)  BP(mean): --  RR: 18 (26 Sep 2019 05:15) (16 - 18)  SpO2: 95% (26 Sep 2019 05:15) (95% - 99%)        PHYSICAL EXAM  General: NAD  HEENT: PERRLA, EOMI, clear oropharynx  Neck: supple  CV: (+) S1/S2 RRR  Lungs: clear to auscultation, no wheezes or rales  Abdomen: soft, mildly distended with some pain to palpation (+) BS  Ext: no edema  Skin: hyperpigmented rash to back and areas of petechia scattered  Neuro: alert and oriented X 3, no focal deficits  PIV: C/D/I         LABS:                                 7.2    71.7  )-----------( 80       ( 26 Sep 2019 07:01 )             24.6         Mean Cell Volume : 93.2 fl  Mean Cell Hemoglobin : 27.2 pg  Mean Cell Hemoglobin Concentration : 29.2 gm/dL  Auto Neutrophil # : 47.5 K/uL  Auto Lymphocyte # : 2.8 K/uL  Auto Monocyte # : 20.0 K/uL  Auto Eosinophil # : 1.2 K/uL  Auto Basophil # : 0.1 K/uL  Auto Neutrophil % : 57.0 %  Auto Lymphocyte % : 3.0 %  Auto Monocyte % : 23.0 %  Auto Eosinophil % : 3.0 %  Auto Basophil % : 0.0 %      09-26    135  |  103  |  37<H>  ----------------------------<  166<H>  4.6   |  16<L>  |  1.58<H>    Ca    8.5      26 Sep 2019 07:01  Phos  5.5     09-26  Mg     2.0     09-26    TPro  5.5<L>  /  Alb  2.9<L>  /  TBili  0.6  /  DBili  x   /  AST  26  /  ALT  12  /  AlkPhos  120  09-26      Mg 2.0  Phos 5.5    PT/INR - ( 26 Sep 2019 07:01 )   PT: 13.1 sec;   INR: 1.13 ratio         PTT - ( 26 Sep 2019 07:01 )  PTT:32.0 sec    LDH 1852  Uric Acid 3.8        RECENT CULTURES:    Culture - Urine (09.23.19 @ 09:55)    Specimen Source: .Urine    Culture Results:   No growth    Culture - Blood (09.23.19 @ 01:11)    Specimen Source: .Blood    Culture Results:   No growth to date.        RADIOLOGY & ADDITIONAL STUDIES:    EXAM:  CT ABDOMEN AND PELVIS OC IC                        PROCEDURE DATE:  09/24/2019    IMPRESSION: Hepatomegaly, with bilobar hypodense lesions, suspicious for metastatic   disease. Splenomegaly, with multiple splenic infarcts. Retroperitoneal lymphadenopathy.  Enlarged prostate gland. A 4.8 cm infrarenal abdominal aortic aneurysm, status post bifurcated   aortic stent graft.            EXAM:  CT ANGIO CHEST (W)AW IC                        PROCEDURE DATE:  09/23/2019    IMPRESSION: No central pulmonary embolus. The subsegmental branches are not   evaluated secondary to respiratory motion. 4.3 x 3.4 cm mass posterior left upper lobe. 1.3 cm anterior right   upper lobe nodule. These are likely neoplastic. Multiple hypodense lesions within the liver likely metastatic. Splenomegaly.  Peripheral hypodensities within the spleen may   represent infarct or metastatic lesions or combination of both. Diagnosis: CMMoL    Protocol/Chemo Regimen: TBD     ID: [id number]    Day: N/A     Pt endorsed: rash to back and abdominal pain both continue to improved    Review of Systems: Patient denies headache, dizziness, visual changes, chest pain, palpitations, SOB, abdominal pain, nausea, vomiting, diarrhea or dysuria.    Pain scale: 2/10 abdominal pain                                    Diet: Consistent Carb    Allergies: No Known Allergies      ANTIMICROBIALS  levoFLOXacin  Tablet 500 milliGRAM(s) Oral every 24 hours      HEME/ONC MEDICATIONS  hydroxyurea 500 milliGRAM(s) Oral every 8 hours      STANDING MEDICATIONS  allopurinol 300 milliGRAM(s) Oral daily  amLODIPine   Tablet 5 milliGRAM(s) Oral daily  AQUAPHOR (petrolatum Ointment) 1 Application(s) Topical two times a day  Biotene Dry Mouth Oral Rinse 5 milliLiter(s) Swish and Spit five times a day  bisoprolol   Tablet 5 milliGRAM(s) Oral daily  calcium acetate 667 milliGRAM(s) Oral four times a day with meals  camphor 0.5%/menthol 0.5% Topical Lotion 1 Application(s) Topical two times a day  dextrose 5%. 1000 milliLiter(s) IV Continuous <Continuous>  dextrose 50% Injectable 12.5 Gram(s) IV Push once  dextrose 50% Injectable 25 Gram(s) IV Push once  dextrose 50% Injectable 25 Gram(s) IV Push once  docusate sodium 100 milliGRAM(s) Oral three times a day  finasteride 5 milliGRAM(s) Oral daily  influenza   Vaccine 0.5 milliLiter(s) IntraMuscular once  insulin lispro (HumaLOG) corrective regimen sliding scale   SubCutaneous three times a day before meals  insulin lispro (HumaLOG) corrective regimen sliding scale   SubCutaneous at bedtime  lidocaine 2% Injectable 1 Vial(s) Local Injection once  melatonin 5 milliGRAM(s) Oral at bedtime  nicotine - 21 mG/24Hr(s) Patch 1 patch Transdermal daily  simvastatin 10 milliGRAM(s) Oral at bedtime  sodium chloride 0.9%. 1000 milliLiter(s) IV Continuous <Continuous>  tamsulosin 0.4 milliGRAM(s) Oral at bedtime  triamcinolone 0.1% Ointment 1 Application(s) Topical two times a day      PRN MEDICATIONS  acetaminophen   Tablet .. 650 milliGRAM(s) Oral every 6 hours PRN  dextrose 40% Gel 15 Gram(s) Oral once PRN  glucagon  Injectable 1 milliGRAM(s) IntraMuscular once PRN  morphine  - Injectable 2 milliGRAM(s) IV Push every 4 hours PRN  zolpidem 5 milliGRAM(s) Oral at bedtime PRN      Vital Signs Last 24 Hrs  T(C): 37.1 (26 Sep 2019 05:15), Max: 38 (25 Sep 2019 09:45)  T(F): 98.8 (26 Sep 2019 05:15), Max: 100.4 (25 Sep 2019 09:45)  HR: 82 (26 Sep 2019 05:15) (67 - 82)  BP: 114/89 (26 Sep 2019 05:15) (94/63 - 114/89)  BP(mean): --  RR: 18 (26 Sep 2019 05:15) (16 - 18)  SpO2: 95% (26 Sep 2019 05:15) (95% - 99%)        PHYSICAL EXAM  General: NAD  HEENT: PERRLA, EOMI, clear oropharynx  Neck: supple  CV: (+) S1/S2 RRR  Lungs: clear to auscultation, no wheezes or rales  Abdomen: soft, mildly distended with some pain to palpation (+) BS  Ext: no edema  Skin: hyperpigmented rash to back and areas of petechia scattered  Neuro: alert and oriented X 3, no focal deficits  PIV: C/D/I         LABS:                                 7.2    71.7  )-----------( 80       ( 26 Sep 2019 07:01 )             24.6         Mean Cell Volume : 93.2 fl  Mean Cell Hemoglobin : 27.2 pg  Mean Cell Hemoglobin Concentration : 29.2 gm/dL  Auto Neutrophil # : 47.5 K/uL  Auto Lymphocyte # : 2.8 K/uL  Auto Monocyte # : 20.0 K/uL  Auto Eosinophil # : 1.2 K/uL  Auto Basophil # : 0.1 K/uL  Auto Neutrophil % : 57.0 %  Auto Lymphocyte % : 3.0 %  Auto Monocyte % : 23.0 %  Auto Eosinophil % : 3.0 %  Auto Basophil % : 0.0 %      09-26    135  |  103  |  37<H>  ----------------------------<  166<H>  4.6   |  16<L>  |  1.58<H>    Ca    8.5      26 Sep 2019 07:01  Phos  5.5     09-26  Mg     2.0     09-26    TPro  5.5<L>  /  Alb  2.9<L>  /  TBili  0.6  /  DBili  x   /  AST  26  /  ALT  12  /  AlkPhos  120  09-26      Mg 2.0  Phos 5.5    PT/INR - ( 26 Sep 2019 07:01 )   PT: 13.1 sec;   INR: 1.13 ratio         PTT - ( 26 Sep 2019 07:01 )  PTT:32.0 sec    LDH 1852  Uric Acid 3.8        RECENT CULTURES:    Culture - Urine (09.23.19 @ 09:55)    Specimen Source: .Urine    Culture Results:   No growth    Culture - Blood (09.23.19 @ 01:11)    Specimen Source: .Blood    Culture Results:   No growth to date.        RADIOLOGY & ADDITIONAL STUDIES:    EXAM:  CT ABDOMEN AND PELVIS OC IC                        PROCEDURE DATE:  09/24/2019    IMPRESSION: Hepatomegaly, with bilobar hypodense lesions, suspicious for metastatic   disease. Splenomegaly, with multiple splenic infarcts. Retroperitoneal lymphadenopathy.  Enlarged prostate gland. A 4.8 cm infrarenal abdominal aortic aneurysm, status post bifurcated   aortic stent graft.            EXAM:  CT ANGIO CHEST (W)AW IC                        PROCEDURE DATE:  09/23/2019    IMPRESSION: No central pulmonary embolus. The subsegmental branches are not   evaluated secondary to respiratory motion. 4.3 x 3.4 cm mass posterior left upper lobe. 1.3 cm anterior right   upper lobe nodule. These are likely neoplastic. Multiple hypodense lesions within the liver likely metastatic. Splenomegaly.  Peripheral hypodensities within the spleen may   represent infarct or metastatic lesions or combination of both.

## 2019-09-26 NOTE — PROGRESS NOTE ADULT - PROBLEM SELECTOR PLAN 3
with metastasis to the liver diagnosed by needle bx in [facility]  CT C/A/P shows 4.3 x 3.4 cm mass posterior left upper lobe. 1.3 cm anterior right   upper lobe nodule. These are likely neoplastic. Multiple hypodense lesions within the liver likely metastatic  Will likely need to manage acute leukemia before treating Lung CA, however will have IR bx liver to establish origin on 9/27

## 2019-09-27 ENCOUNTER — RESULT REVIEW (OUTPATIENT)
Age: 84
End: 2019-09-27

## 2019-09-27 LAB
ALBUMIN SERPL ELPH-MCNC: 2.9 G/DL — LOW (ref 3.3–5)
ALP SERPL-CCNC: 113 U/L — SIGNIFICANT CHANGE UP (ref 40–120)
ALT FLD-CCNC: 12 U/L — SIGNIFICANT CHANGE UP (ref 10–45)
ANION GAP SERPL CALC-SCNC: 12 MMOL/L — SIGNIFICANT CHANGE UP (ref 5–17)
APTT BLD: 33.5 SEC — SIGNIFICANT CHANGE UP (ref 27.5–36.3)
AST SERPL-CCNC: 20 U/L — SIGNIFICANT CHANGE UP (ref 10–40)
BASOPHILS # BLD AUTO: 0.2 K/UL — SIGNIFICANT CHANGE UP (ref 0–0.2)
BILIRUB SERPL-MCNC: 0.6 MG/DL — SIGNIFICANT CHANGE UP (ref 0.2–1.2)
BLD GP AB SCN SERPL QL: NEGATIVE — SIGNIFICANT CHANGE UP
BUN SERPL-MCNC: 44 MG/DL — HIGH (ref 7–23)
CALCIUM SERPL-MCNC: 8.1 MG/DL — LOW (ref 8.4–10.5)
CHLORIDE SERPL-SCNC: 104 MMOL/L — SIGNIFICANT CHANGE UP (ref 96–108)
CO2 SERPL-SCNC: 17 MMOL/L — LOW (ref 22–31)
CREAT SERPL-MCNC: 1.46 MG/DL — HIGH (ref 0.5–1.3)
EOSINOPHIL # BLD AUTO: 0.4 K/UL — SIGNIFICANT CHANGE UP (ref 0–0.5)
EOSINOPHIL NFR BLD AUTO: 3 % — SIGNIFICANT CHANGE UP (ref 0–6)
GAMMA INTERFERON BACKGROUND BLD IA-ACNC: 0.01 IU/ML — SIGNIFICANT CHANGE UP
GLUCOSE BLDC GLUCOMTR-MCNC: 187 MG/DL — HIGH (ref 70–99)
GLUCOSE BLDC GLUCOMTR-MCNC: 204 MG/DL — HIGH (ref 70–99)
GLUCOSE BLDC GLUCOMTR-MCNC: 243 MG/DL — HIGH (ref 70–99)
GLUCOSE SERPL-MCNC: 174 MG/DL — HIGH (ref 70–99)
HCT VFR BLD CALC: 23 % — LOW (ref 39–50)
HGB BLD-MCNC: 6.9 G/DL — CRITICAL LOW (ref 13–17)
INR BLD: 1.14 RATIO — SIGNIFICANT CHANGE UP (ref 0.88–1.16)
LDH SERPL L TO P-CCNC: 1487 U/L — HIGH (ref 50–242)
LYMPHOCYTES # BLD AUTO: 2.4 K/UL — SIGNIFICANT CHANGE UP (ref 1–3.3)
LYMPHOCYTES # BLD AUTO: 3 % — LOW (ref 13–44)
M TB IFN-G BLD-IMP: ABNORMAL
M TB IFN-G CD4+ BCKGRND COR BLD-ACNC: 0.01 IU/ML — SIGNIFICANT CHANGE UP
M TB IFN-G CD4+CD8+ BCKGRND COR BLD-ACNC: 0.01 IU/ML — SIGNIFICANT CHANGE UP
MAGNESIUM SERPL-MCNC: 2.2 MG/DL — SIGNIFICANT CHANGE UP (ref 1.6–2.6)
MCHC RBC-ENTMCNC: 27.6 PG — SIGNIFICANT CHANGE UP (ref 27–34)
MCHC RBC-ENTMCNC: 30 GM/DL — LOW (ref 32–36)
MCV RBC AUTO: 92 FL — SIGNIFICANT CHANGE UP (ref 80–100)
MONOCYTES # BLD AUTO: 6.9 K/UL — HIGH (ref 0–0.9)
MONOCYTES NFR BLD AUTO: 19 % — HIGH (ref 2–14)
NEUTROPHILS # BLD AUTO: 21.3 K/UL — HIGH (ref 1.8–7.4)
NEUTROPHILS NFR BLD AUTO: 63 % — SIGNIFICANT CHANGE UP (ref 43–77)
PHOSPHATE SERPL-MCNC: 6 MG/DL — HIGH (ref 2.5–4.5)
PLATELET # BLD AUTO: 70 K/UL — LOW (ref 150–400)
POTASSIUM SERPL-MCNC: 4.5 MMOL/L — SIGNIFICANT CHANGE UP (ref 3.5–5.3)
POTASSIUM SERPL-SCNC: 4.5 MMOL/L — SIGNIFICANT CHANGE UP (ref 3.5–5.3)
PROT SERPL-MCNC: 5.6 G/DL — LOW (ref 6–8.3)
PROTHROM AB SERPL-ACNC: 13 SEC — HIGH (ref 10–12.9)
QUANT TB PLUS MITOGEN MINUS NIL: 0.14 IU/ML — SIGNIFICANT CHANGE UP
RBC # BLD: 2.5 M/UL — LOW (ref 4.2–5.8)
RBC # FLD: 18.2 % — HIGH (ref 10.3–14.5)
RH IG SCN BLD-IMP: POSITIVE — SIGNIFICANT CHANGE UP
SODIUM SERPL-SCNC: 133 MMOL/L — LOW (ref 135–145)
URATE SERPL-MCNC: 4.6 MG/DL — SIGNIFICANT CHANGE UP (ref 3.4–8.8)
WBC # BLD: 31.1 K/UL — HIGH (ref 3.8–10.5)
WBC # FLD AUTO: 31.1 K/UL — HIGH (ref 3.8–10.5)

## 2019-09-27 PROCEDURE — 77001 FLUOROGUIDE FOR VEIN DEVICE: CPT | Mod: 26

## 2019-09-27 PROCEDURE — 88307 TISSUE EXAM BY PATHOLOGIST: CPT | Mod: 26

## 2019-09-27 PROCEDURE — 88341 IMHCHEM/IMCYTCHM EA ADD ANTB: CPT | Mod: 26,59

## 2019-09-27 PROCEDURE — 47000 NEEDLE BIOPSY OF LIVER PERQ: CPT

## 2019-09-27 PROCEDURE — 76937 US GUIDE VASCULAR ACCESS: CPT | Mod: 26

## 2019-09-27 PROCEDURE — 88342 IMHCHEM/IMCYTCHM 1ST ANTB: CPT | Mod: 26,59

## 2019-09-27 PROCEDURE — 76942 ECHO GUIDE FOR BIOPSY: CPT | Mod: 26,59

## 2019-09-27 PROCEDURE — 88360 TUMOR IMMUNOHISTOCHEM/MANUAL: CPT | Mod: 26

## 2019-09-27 PROCEDURE — 36561 INSERT TUNNELED CV CATH: CPT | Mod: RT

## 2019-09-27 PROCEDURE — 99232 SBSQ HOSP IP/OBS MODERATE 35: CPT | Mod: GC

## 2019-09-27 PROCEDURE — 93010 ELECTROCARDIOGRAM REPORT: CPT

## 2019-09-27 RX ORDER — CALCIUM ACETATE 667 MG
667 TABLET ORAL
Refills: 0 | Status: COMPLETED | OUTPATIENT
Start: 2019-09-27 | End: 2019-09-28

## 2019-09-27 RX ORDER — ALLOPURINOL 300 MG
1 TABLET ORAL
Qty: 30 | Refills: 0
Start: 2019-09-27 | End: 2019-10-26

## 2019-09-27 RX ORDER — CALCIUM ACETATE 667 MG
3 TABLET ORAL
Qty: 120 | Refills: 0
Start: 2019-09-27 | End: 2019-10-06

## 2019-09-27 RX ORDER — ALLOPURINOL 300 MG
300 TABLET ORAL DAILY
Refills: 0 | Status: DISCONTINUED | OUTPATIENT
Start: 2019-09-27 | End: 2019-10-02

## 2019-09-27 RX ADMIN — HYDROXYUREA 1000 MILLIGRAM(S): 500 CAPSULE ORAL at 22:01

## 2019-09-27 RX ADMIN — Medication 100 MILLIGRAM(S): at 22:00

## 2019-09-27 RX ADMIN — Medication 5 MILLILITER(S): at 12:20

## 2019-09-27 RX ADMIN — Medication 1: at 08:35

## 2019-09-27 RX ADMIN — Medication 300 MILLIGRAM(S): at 22:14

## 2019-09-27 RX ADMIN — Medication 5 MILLILITER(S): at 09:05

## 2019-09-27 RX ADMIN — CHLORHEXIDINE GLUCONATE 1 APPLICATION(S): 213 SOLUTION TOPICAL at 09:05

## 2019-09-27 RX ADMIN — TAMSULOSIN HYDROCHLORIDE 0.4 MILLIGRAM(S): 0.4 CAPSULE ORAL at 22:00

## 2019-09-27 RX ADMIN — PRAMOXINE HYDROCHLORIDE 1 APPLICATION(S): 150 AEROSOL, FOAM RECTAL at 05:24

## 2019-09-27 RX ADMIN — MORPHINE SULFATE 2 MILLIGRAM(S): 50 CAPSULE, EXTENDED RELEASE ORAL at 05:24

## 2019-09-27 RX ADMIN — Medication 667 MILLIGRAM(S): at 22:00

## 2019-09-27 RX ADMIN — TENOFOVIR DISOPROXIL FUMARATE 300 MILLIGRAM(S): 300 TABLET, FILM COATED ORAL at 22:00

## 2019-09-27 RX ADMIN — MORPHINE SULFATE 2 MILLIGRAM(S): 50 CAPSULE, EXTENDED RELEASE ORAL at 05:19

## 2019-09-27 RX ADMIN — ZOLPIDEM TARTRATE 5 MILLIGRAM(S): 10 TABLET ORAL at 22:13

## 2019-09-27 RX ADMIN — FINASTERIDE 5 MILLIGRAM(S): 5 TABLET, FILM COATED ORAL at 22:16

## 2019-09-27 RX ADMIN — Medication 2: at 18:33

## 2019-09-27 RX ADMIN — SIMVASTATIN 10 MILLIGRAM(S): 20 TABLET, FILM COATED ORAL at 22:00

## 2019-09-27 NOTE — PROGRESS NOTE ADULT - ASSESSMENT
This is an 85 yo M with PMHx of T2DM, HTN, PVD, CAD s/p recent stent x 3 (9/19), AAA with stent, current smoker with newly diagnosis of Lung CA with mets to the liver (Lung bx completed in [place], currently not receiving tx) admitted for management of acute leukemia. Patient is status post BM bx completed 9/23, revealing CMMoL.

## 2019-09-27 NOTE — PROGRESS NOTE ADULT - PROBLEM SELECTOR PLAN 9
No pharmacologic ppx 2/2 port placement and worsening thrombocytopenia  Encourage ambulation      Contact info [phone]

## 2019-09-27 NOTE — PROGRESS NOTE ADULT - ATTENDING COMMENTS
83 yo M Mandarin-speaking, p/w leukocytosis also with lung masses ? 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 Cont Hydrea, wbc improved. Awaiting liver bx -pt a second malignancy, likely metastatic lung CA with liver mets given long hx smoking  -cont to check labs 2x/day  -monitor for tumor lysis syndrome -needs IVF with close monitoring for pulm edema. Allopurinol  -hx CAD and stents -on ASA 81mg as long as plt>50. Echo -EF 65%  -hep B -core Ab +,  PCR pending, cont Virea. Hep C, and HIV nonreactive. Quantiferron gold -pending  -awaiting Port placement prior to chemo and liver bx. CT A/P done -liver masses, splenic infarcts  -physical therapy eval -pt walked 50 ft 83 yo M Mandarin-speaking, p/w leukocytosis also with lung masses ? 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 Cont Hydrea, wbc improved -on discharge, if wbc<20k/ul, would give 500mg twice daily. Awaiting Mediport and liver bx today -pt a second malignancy, likely metastatic lung CA with liver mets given long hx smoking  -monitor for tumor lysis syndrome -on Allopurinol  -hx CAD and stents -on ASA 81mg as long as plt>50. Echo -EF 65%  -hep B -core Ab +,  PCR pending, cont Virea. Hep C, and HIV nonreactive. Quantiferron gold -pending  -dispo planning -d/c home with outpt f/u if stable

## 2019-09-27 NOTE — PROGRESS NOTE ADULT - PROBLEM SELECTOR PLAN 3
with metastasis to the liver diagnosed by needle bx in [facility]  CT C/A/P shows 4.3 x 3.4 cm mass posterior left upper lobe. 1.3 cm anterior right   upper lobe nodule. These are likely neoplastic. Multiple hypodense lesions within the liver likely metastatic  Will likely need to manage acute leukemia before treating Lung CA, however will have IR bx liver to establish origin on 9/27 with metastasis to the liver diagnosed by needle bx in [facility]  CT C/A/P shows 4.3 x 3.4 cm mass posterior left upper lobe. 1.3 cm anterior right   upper lobe nodule. These are likely neoplastic. Multiple hypodense lesions within the liver likely metastatic  Will likely need to manage acute leukemia before treating Lung CA, however will have IR bx liver to establish origin scheduled for today 9/27 with metastasis to the liver diagnosed by needle bx in [facility]  CT C/A/P shows 4.3 x 3.4 cm mass posterior left upper lobe. 1.3 cm anterior right   upper lobe nodule. These are likely neoplastic. Multiple hypodense lesions within the liver likely metastatic  Will likely need to manage acute leukemia before treating Lung CA, however will have IR bx liver to establish origin scheduled for today

## 2019-09-27 NOTE — PROGRESS NOTE ADULT - SUBJECTIVE AND OBJECTIVE BOX
84y man with DM2, HTN, CVA, CAD s/p stent, PVD, BPH, current heavy smoker, newly dx lung ca ( biopsy performed in [place] w/ liver mets, newly diagnosed Chronic myelomonocytic leukemia. Pt referred to IR for chest port placement. Ir also requested to perform liver biopsy to confirm metastatic lung CA.   ID clearance on chart for chest port placement.     NPO status:   Anticoagulation: None.   Antibiotics:     Allergies: No Known Allergies      PAST MEDICAL & SURGICAL HISTORY:  Lung cancer  High cholesterol  HTN (hypertension)  Diabetes mellitus  No significant past surgical history        Pertinent labs:                      6.9    31.1  )-----------( 70       ( 27 Sep 2019 07:19 )             23.0   09-27    133<L>  |  104  |  44<H>  ----------------------------<  174<H>  4.5   |  17<L>  |  1.46<H>    Ca    8.1<L>      27 Sep 2019 07:19  Phos  6.0     09-27  Mg     2.2     09-27    TPro  5.6<L>  /  Alb  2.9<L>  /  TBili  0.6  /  DBili  x   /  AST  20  /  ALT  12  /  AlkPhos  113  09-27  PT/INR - ( 27 Sep 2019 07:19 )   PT: 13.0 sec;   INR: 1.14 ratio         PTT - ( 27 Sep 2019 07:19 )  PTT:33.5 sec    Consent: Procedure/risks/ Benefits explained. Informed consent obtained. Pt verbalizes understanding. 84y man with DM2, HTN, CVA, CAD s/p stent, PVD, BPH, current heavy smoker, newly dx lung ca ( biopsy performed in [place] w/ liver mets, newly diagnosed Chronic myelomonocytic leukemia. Pt referred to IR for chest port placement. IR also requested to perform liver biopsy to confirm metastatic lung CA.   ID clearance on chart for chest port placement.     NPO status: NPO past midnight.   Anticoagulation: Aspirin on 9/23 and heparin SQ on 9/24.     Allergies: No Known Allergies      PAST MEDICAL & SURGICAL HISTORY:  Lung cancer  High cholesterol  HTN (hypertension)  Diabetes mellitus  No significant past surgical history        Pertinent labs:                      6.9    31.1  )-----------( 70       ( 27 Sep 2019 07:19 )             23.0   09-27    133<L>  |  104  |  44<H>  ----------------------------<  174<H>  4.5   |  17<L>  |  1.46<H>    Ca    8.1<L>      27 Sep 2019 07:19  Phos  6.0     09-27  Mg     2.2     09-27    TPro  5.6<L>  /  Alb  2.9<L>  /  TBili  0.6  /  DBili  x   /  AST  20  /  ALT  12  /  AlkPhos  113  09-27  PT/INR - ( 27 Sep 2019 07:19 )   PT: 13.0 sec;   INR: 1.14 ratio         PTT - ( 27 Sep 2019 07:19 )  PTT:33.5 sec    Consent: Pt's wife at bedside. Refused  phone. Procedure/risks/ Benefits explained. Informed consent obtained. Pt verbalizes understanding.

## 2019-09-27 NOTE — PROGRESS NOTE ADULT - PROBLEM SELECTOR PLAN 6
Pletal on hold for Mediport placement  Will potentially restart 24 hours after unless thrombocytopenia continues to worsen Pletal on hold for Mediport placement  Will potentially likely hold after placement for worsening thrombocytopenia

## 2019-09-27 NOTE — PROCEDURE NOTE - GENERAL PROCEDURE DETAILS
right ij chest port placed. tip in svc. ok to access. left hepatic liver mass bx with 18 gauge needle. specimen ddeemed adequate by cytopath. bx tract embolized with flowable dstat.

## 2019-09-27 NOTE — PROGRESS NOTE ADULT - PROBLEM SELECTOR PLAN 1
9/23 Status post BM bx  Continue Hydrea  Monitor CBC/Lytes and transfuse/replete PRN  TLS labs q 12  Strict Is and Os/daily weights/Mouth Care  Antiemetics   IVF  Continue Allopurinol 300 mg daily  9/27 For Mediport placement 9/23 Status post BM bx  Continue Hydrea  Monitor CBC/Lytes and transfuse/replete PRN  Anemia: HGB 6.9, 1 unit of PRBCs ordered  Hyperphosphatemia: Phosph 6.0, Phoslo ordered  TLS labs q 12  Strict Is and Os/daily weights/Mouth Care  Antiemetics   IVF  Continue Allopurinol 300 mg daily  Mediport placement and liver biopsy scheduled today 9/23 Status post BM bx  Continue Hydrea  Monitor CBC/Lytes and transfuse/replete PRN  Anemia: HGB 6.9, 1 unit of PRBCs ordered  Hyperphosphatemia: Phosph 6.0, Phoslo ordered  TLS labs q 12  Strict Is and Os/daily weights/Mouth Care  Antiemetics   IVF  Continue Allopurinol 300 mg daily  Mediport placement scheduled today

## 2019-09-27 NOTE — CHART NOTE - NSCHARTNOTEFT_GEN_A_CORE
Nutrition Follow Up Note  Patient seen for: Malnutrition Follow Up     Interim events noted, chart reviewed. Pt c CML, on Hydrea, noted plan for liver biopsy today.     Source: pt, spouse at bedside- pt preferred for spouse to translate at this time     Diet : NPO, previously on consistent CHO c snack diet c Glucerna shakes x 2 daily     Patient reports: appetite remains suboptimal but he has been gradually taking in more. Spouse reports pt consumed 1/3 of entree last night (pasta c shrimp) but also consumed a whole fruit bowl. Reports pt did try Glucerna shake once in the last few days and did like it. Noted last BM 9/24, pt did not offer any further complaints at this time- spouse was encouraging more fresh foods as well to promote intake and regular BMs.     Daily Weight: admit: 127.6 pounds-> noted standing wt: 9/25: 136.6->9/26: 137.5 pounds, wt higher than admit, questionable if admit wt inaccurate, would continue to monitor trend     Pertinent Medications: MEDICATIONS  (STANDING):  allopurinol 100 milliGRAM(s) Oral daily  amLODIPine   Tablet 5 milliGRAM(s) Oral daily  AQUAPHOR (petrolatum Ointment) 1 Application(s) Topical two times a day  benzonatate 100 milliGRAM(s) Oral three times a day  Biotene Dry Mouth Oral Rinse 5 milliLiter(s) Swish and Spit five times a day  bisoprolol   Tablet 5 milliGRAM(s) Oral daily  calcium acetate 667 milliGRAM(s) Oral four times a day with meals  calcium acetate 667 milliGRAM(s) Oral four times a day with meals  chlorhexidine 2% Cloths 1 Application(s) Topical <User Schedule>  dextrose 5%. 1000 milliLiter(s) (50 mL/Hr) IV Continuous <Continuous>  dextrose 50% Injectable 12.5 Gram(s) IV Push once  dextrose 50% Injectable 25 Gram(s) IV Push once  dextrose 50% Injectable 25 Gram(s) IV Push once  docusate sodium 100 milliGRAM(s) Oral three times a day  finasteride 5 milliGRAM(s) Oral daily  hydroxyurea 1000 milliGRAM(s) Oral every 8 hours  influenza   Vaccine 0.5 milliLiter(s) IntraMuscular once  insulin lispro (HumaLOG) corrective regimen sliding scale   SubCutaneous three times a day before meals  insulin lispro (HumaLOG) corrective regimen sliding scale   SubCutaneous at bedtime  levoFLOXacin  Tablet 250 milliGRAM(s) Oral every 24 hours  lidocaine 2% Injectable 1 Vial(s) Local Injection once  melatonin 5 milliGRAM(s) Oral at bedtime  nicotine - 21 mG/24Hr(s) Patch 1 patch Transdermal daily  pramoxine 1% Topical Lotion 1 Application(s) Topical two times a day  simvastatin 10 milliGRAM(s) Oral at bedtime  sodium chloride 0.9%. 1000 milliLiter(s) (75 mL/Hr) IV Continuous <Continuous>  tamsulosin 0.4 milliGRAM(s) Oral at bedtime  tenofovir disoproxil fumarate (VIREAD) 300 milliGRAM(s) Oral <User Schedule>  triamcinolone 0.1% Ointment 1 Application(s) Topical two times a day    MEDICATIONS  (PRN):  acetaminophen   Tablet .. 650 milliGRAM(s) Oral every 6 hours PRN Temp greater or equal to 38C (100.4F), Moderate Pain (4 - 6), Severe Pain (7 - 10)  dextrose 40% Gel 15 Gram(s) Oral once PRN Blood Glucose LESS THAN 70 milliGRAM(s)/deciliter  glucagon  Injectable 1 milliGRAM(s) IntraMuscular once PRN Glucose LESS THAN 70 milligrams/deciliter  morphine  - Injectable 2 milliGRAM(s) IV Push every 4 hours PRN Moderate Pain (4 - 6)  zolpidem 5 milliGRAM(s) Oral at bedtime PRN Insomnia    Pertinent Labs: 09-27 @ 07:19: Na 133<L>, BUN 44<H>, Cr 1.46<H>, <H>, K+ 4.5, Phos 6.0<H>, Mg 2.2, Alk Phos 113, ALT/SGPT 12, AST/SGOT 20, HbA1c --  09-26 @ 17:00: Na 132<L>, BUN 42<H>, Cr 1.63<H>, <H>, K+ 4.6, Phos 6.2<H>, Mg 2.1, Alk Phos 141<H>, ALT/SGPT 14, AST/SGOT 24, HbA1c --    Finger Sticks:  POCT Blood Glucose.: 187 mg/dL (09-27 @ 08:03)  POCT Blood Glucose.: 198 mg/dL (09-26 @ 21:54)  POCT Blood Glucose.: 227 mg/dL (09-26 @ 17:11)  POCT Blood Glucose.: 182 mg/dL (09-26 @ 14:10)  POCT Blood Glucose.: 208 mg/dL (09-26 @ 12:50)      Skin per nursing documentation: intact  Edema: none at this time     Estimated Needs:   [x] no change since previous assessment      Previous Nutrition Diagnosis: severe malnutrition   Nutrition Diagnosis continues at this time, care plan in progress    New Nutrition Diagnosis: none at this time       Recommend  1) Resume PO diet as medically feasible.   2) Once PO diet resumed, recommend continue with Glucerna shake x 2 daily.   3) Encouraged PO intake, discussed nutrient dense foods and snacks.     Monitoring and Evaluation:     Continue to monitor Nutritional intake, Tolerance to diet prescription, weights, labs, skin integrity    RD remains available upon request and will follow up per protocol  Natalee Hoff MS RD CDN [facility],  #[phone]

## 2019-09-27 NOTE — PROGRESS NOTE ADULT - PROBLEM SELECTOR PLAN 2
The patient is not neutropenic, afebrile  If febrile Pan Cx and CXR  Continue Levaquin, if decompensates start Cefepime  Hep B Core (+) continue Viread, PCR (-)  Follow up QuantiFeron Gold The patient is not neutropenic, afebrile  If febrile Pan Cx and CXR  Continue Levaquin, if decompensates start Cefepime  Hep B Core (+) continue Viread, PCR (-)  QuantiFeron Gold indeterminate on blood work, spoke to dr. Parham from ID who states no acute interventions at this time, to be followed up prior to chemo treatment

## 2019-09-28 LAB
ALBUMIN SERPL ELPH-MCNC: 3 G/DL — LOW (ref 3.3–5)
ALP SERPL-CCNC: 104 U/L — SIGNIFICANT CHANGE UP (ref 40–120)
ALT FLD-CCNC: 11 U/L — SIGNIFICANT CHANGE UP (ref 10–45)
ANION GAP SERPL CALC-SCNC: 13 MMOL/L — SIGNIFICANT CHANGE UP (ref 5–17)
AST SERPL-CCNC: 17 U/L — SIGNIFICANT CHANGE UP (ref 10–40)
BASOPHILS # BLD AUTO: 0.1 K/UL — SIGNIFICANT CHANGE UP (ref 0–0.2)
BASOPHILS NFR BLD AUTO: 0 % — SIGNIFICANT CHANGE UP (ref 0–2)
BILIRUB SERPL-MCNC: 0.7 MG/DL — SIGNIFICANT CHANGE UP (ref 0.2–1.2)
BUN SERPL-MCNC: 41 MG/DL — HIGH (ref 7–23)
CALCIUM SERPL-MCNC: 8.2 MG/DL — LOW (ref 8.4–10.5)
CHLORIDE SERPL-SCNC: 106 MMOL/L — SIGNIFICANT CHANGE UP (ref 96–108)
CO2 SERPL-SCNC: 15 MMOL/L — LOW (ref 22–31)
CREAT SERPL-MCNC: 1.43 MG/DL — HIGH (ref 0.5–1.3)
CULTURE RESULTS: SIGNIFICANT CHANGE UP
CULTURE RESULTS: SIGNIFICANT CHANGE UP
EOSINOPHIL # BLD AUTO: 0.1 K/UL — SIGNIFICANT CHANGE UP (ref 0–0.5)
EOSINOPHIL NFR BLD AUTO: 1 % — SIGNIFICANT CHANGE UP (ref 0–6)
GLUCOSE BLDC GLUCOMTR-MCNC: 197 MG/DL — HIGH (ref 70–99)
GLUCOSE BLDC GLUCOMTR-MCNC: 198 MG/DL — HIGH (ref 70–99)
GLUCOSE BLDC GLUCOMTR-MCNC: 211 MG/DL — HIGH (ref 70–99)
GLUCOSE BLDC GLUCOMTR-MCNC: 218 MG/DL — HIGH (ref 70–99)
GLUCOSE BLDC GLUCOMTR-MCNC: 250 MG/DL — HIGH (ref 70–99)
GLUCOSE SERPL-MCNC: 182 MG/DL — HIGH (ref 70–99)
HCT VFR BLD CALC: 21.3 % — LOW (ref 39–50)
HGB BLD-MCNC: 6.4 G/DL — CRITICAL LOW (ref 13–17)
LDH SERPL L TO P-CCNC: 1149 U/L — HIGH (ref 50–242)
LYMPHOCYTES # BLD AUTO: 0.9 K/UL — LOW (ref 1–3.3)
LYMPHOCYTES # BLD AUTO: 4 % — LOW (ref 13–44)
MAGNESIUM SERPL-MCNC: 2.1 MG/DL — SIGNIFICANT CHANGE UP (ref 1.6–2.6)
MCHC RBC-ENTMCNC: 27.6 PG — SIGNIFICANT CHANGE UP (ref 27–34)
MCHC RBC-ENTMCNC: 30.1 GM/DL — LOW (ref 32–36)
MCV RBC AUTO: 91.5 FL — SIGNIFICANT CHANGE UP (ref 80–100)
MONOCYTES # BLD AUTO: 2 K/UL — HIGH (ref 0–0.9)
MONOCYTES NFR BLD AUTO: 14 % — SIGNIFICANT CHANGE UP (ref 2–14)
NEUTROPHILS # BLD AUTO: 8 K/UL — HIGH (ref 1.8–7.4)
NEUTROPHILS NFR BLD AUTO: 72 % — SIGNIFICANT CHANGE UP (ref 43–77)
PHOSPHATE SERPL-MCNC: 5.2 MG/DL — HIGH (ref 2.5–4.5)
PLATELET # BLD AUTO: 48 K/UL — LOW (ref 150–400)
POTASSIUM SERPL-MCNC: 5 MMOL/L — SIGNIFICANT CHANGE UP (ref 3.5–5.3)
POTASSIUM SERPL-SCNC: 5 MMOL/L — SIGNIFICANT CHANGE UP (ref 3.5–5.3)
PROT SERPL-MCNC: 5.3 G/DL — LOW (ref 6–8.3)
RBC # BLD: 2.32 M/UL — LOW (ref 4.2–5.8)
RBC # FLD: 17.6 % — HIGH (ref 10.3–14.5)
SODIUM SERPL-SCNC: 134 MMOL/L — LOW (ref 135–145)
SPECIMEN SOURCE: SIGNIFICANT CHANGE UP
SPECIMEN SOURCE: SIGNIFICANT CHANGE UP
URATE SERPL-MCNC: 4.6 MG/DL — SIGNIFICANT CHANGE UP (ref 3.4–8.8)
WBC # BLD: 11.1 K/UL — HIGH (ref 3.8–10.5)
WBC # FLD AUTO: 11.1 K/UL — HIGH (ref 3.8–10.5)

## 2019-09-28 PROCEDURE — 71045 X-RAY EXAM CHEST 1 VIEW: CPT | Mod: 26

## 2019-09-28 PROCEDURE — 74018 RADEX ABDOMEN 1 VIEW: CPT | Mod: 26

## 2019-09-28 PROCEDURE — 99232 SBSQ HOSP IP/OBS MODERATE 35: CPT

## 2019-09-28 PROCEDURE — 93010 ELECTROCARDIOGRAM REPORT: CPT

## 2019-09-28 RX ORDER — POLYETHYLENE GLYCOL 3350 17 G/17G
17 POWDER, FOR SOLUTION ORAL DAILY
Refills: 0 | Status: DISCONTINUED | OUTPATIENT
Start: 2019-09-28 | End: 2019-10-02

## 2019-09-28 RX ORDER — HYDROXYUREA 500 MG/1
500 CAPSULE ORAL EVERY 12 HOURS
Refills: 0 | Status: DISCONTINUED | OUTPATIENT
Start: 2019-09-28 | End: 2019-09-30

## 2019-09-28 RX ORDER — SENNA PLUS 8.6 MG/1
2 TABLET ORAL AT BEDTIME
Refills: 0 | Status: DISCONTINUED | OUTPATIENT
Start: 2019-09-28 | End: 2019-10-02

## 2019-09-28 RX ORDER — CALCIUM ACETATE 667 MG
667 TABLET ORAL
Refills: 0 | Status: DISCONTINUED | OUTPATIENT
Start: 2019-09-28 | End: 2019-09-29

## 2019-09-28 RX ORDER — SIMETHICONE 80 MG/1
80 TABLET, CHEWABLE ORAL ONCE
Refills: 0 | Status: COMPLETED | OUTPATIENT
Start: 2019-09-28 | End: 2019-09-28

## 2019-09-28 RX ADMIN — Medication 650 MILLIGRAM(S): at 07:13

## 2019-09-28 RX ADMIN — MORPHINE SULFATE 2 MILLIGRAM(S): 50 CAPSULE, EXTENDED RELEASE ORAL at 19:49

## 2019-09-28 RX ADMIN — Medication 100 MILLIGRAM(S): at 06:15

## 2019-09-28 RX ADMIN — Medication 1 APPLICATION(S): at 06:17

## 2019-09-28 RX ADMIN — SODIUM CHLORIDE 75 MILLILITER(S): 9 INJECTION INTRAMUSCULAR; INTRAVENOUS; SUBCUTANEOUS at 06:16

## 2019-09-28 RX ADMIN — Medication 2: at 13:19

## 2019-09-28 RX ADMIN — Medication 1 APPLICATION(S): at 06:27

## 2019-09-28 RX ADMIN — Medication 5 MILLILITER(S): at 09:20

## 2019-09-28 RX ADMIN — MORPHINE SULFATE 2 MILLIGRAM(S): 50 CAPSULE, EXTENDED RELEASE ORAL at 20:19

## 2019-09-28 RX ADMIN — Medication 100 MILLIGRAM(S): at 21:47

## 2019-09-28 RX ADMIN — TAMSULOSIN HYDROCHLORIDE 0.4 MILLIGRAM(S): 0.4 CAPSULE ORAL at 21:47

## 2019-09-28 RX ADMIN — Medication 5 MILLILITER(S): at 19:49

## 2019-09-28 RX ADMIN — Medication 1 APPLICATION(S): at 17:58

## 2019-09-28 RX ADMIN — MORPHINE SULFATE 2 MILLIGRAM(S): 50 CAPSULE, EXTENDED RELEASE ORAL at 14:28

## 2019-09-28 RX ADMIN — Medication 667 MILLIGRAM(S): at 06:44

## 2019-09-28 RX ADMIN — Medication 300 MILLIGRAM(S): at 13:20

## 2019-09-28 RX ADMIN — Medication 1: at 09:21

## 2019-09-28 RX ADMIN — PRAMOXINE HYDROCHLORIDE 1 APPLICATION(S): 150 AEROSOL, FOAM RECTAL at 17:58

## 2019-09-28 RX ADMIN — FINASTERIDE 5 MILLIGRAM(S): 5 TABLET, FILM COATED ORAL at 13:20

## 2019-09-28 RX ADMIN — MORPHINE SULFATE 2 MILLIGRAM(S): 50 CAPSULE, EXTENDED RELEASE ORAL at 13:58

## 2019-09-28 RX ADMIN — CHLORHEXIDINE GLUCONATE 1 APPLICATION(S): 213 SOLUTION TOPICAL at 09:20

## 2019-09-28 RX ADMIN — HYDROXYUREA 500 MILLIGRAM(S): 500 CAPSULE ORAL at 17:57

## 2019-09-28 RX ADMIN — Medication 667 MILLIGRAM(S): at 17:56

## 2019-09-28 RX ADMIN — Medication 5 MILLILITER(S): at 13:20

## 2019-09-28 RX ADMIN — Medication 650 MILLIGRAM(S): at 06:43

## 2019-09-28 RX ADMIN — ZOLPIDEM TARTRATE 5 MILLIGRAM(S): 10 TABLET ORAL at 21:48

## 2019-09-28 RX ADMIN — Medication 1 APPLICATION(S): at 17:56

## 2019-09-28 RX ADMIN — HYDROXYUREA 1000 MILLIGRAM(S): 500 CAPSULE ORAL at 06:15

## 2019-09-28 RX ADMIN — PRAMOXINE HYDROCHLORIDE 1 APPLICATION(S): 150 AEROSOL, FOAM RECTAL at 06:17

## 2019-09-28 RX ADMIN — Medication 667 MILLIGRAM(S): at 13:21

## 2019-09-28 RX ADMIN — Medication 2: at 18:09

## 2019-09-28 RX ADMIN — SIMVASTATIN 10 MILLIGRAM(S): 20 TABLET, FILM COATED ORAL at 21:47

## 2019-09-28 RX ADMIN — Medication 667 MILLIGRAM(S): at 21:47

## 2019-09-28 RX ADMIN — Medication 100 MILLIGRAM(S): at 13:21

## 2019-09-28 RX ADMIN — SENNA PLUS 2 TABLET(S): 8.6 TABLET ORAL at 21:47

## 2019-09-28 RX ADMIN — SIMETHICONE 80 MILLIGRAM(S): 80 TABLET, CHEWABLE ORAL at 19:48

## 2019-09-28 RX ADMIN — POLYETHYLENE GLYCOL 3350 17 GRAM(S): 17 POWDER, FOR SOLUTION ORAL at 09:21

## 2019-09-28 NOTE — PROGRESS NOTE ADULT - ATTENDING COMMENTS
83 yo M Mandarin-speaking, p/w leukocytosis also with lung masses ? 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 Cont Hydrea, wbc improved -on discharge, if wbc<20k/ul, would give 500mg twice daily. Awaiting Mediport and liver bx today -pt a second malignancy, likely metastatic lung CA with liver mets given long hx smoking  -monitor for tumor lysis syndrome -on Allopurinol  -hx CAD and stents -on ASA 81mg as long as plt>50. Echo -EF 65%  -hep B -core Ab +,  PCR pending, cont Virea. Hep C, and HIV nonreactive. Quantiferron gold -pending  -dispo planning -d/c home with outpt f/u if stable 85 yo M Mandarin-speaking, p/w leukocytosis also with lung masses ? 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 Cont Hydrea, decrease dose to 500mg twice daily. s/p liver bx 9/27 -pt has a second malignancy, likely metastatic lung CA with liver mets given long hx smoking  -monitor for tumor lysis syndrome -on Allopurinol  -hx CAD and stents -ASA 81mg on hold given plt>50. Echo -EF 65%  -hep B -core Ab +,  PCR pending, cont Virea. Hep C, and HIV nonreactive. Quantiferron gold indeterminate  -c/o abdominal bloating -check AXR  -dispo planning -d/c home with outpt f/u when stable

## 2019-09-28 NOTE — PROGRESS NOTE ADULT - SUBJECTIVE AND OBJECTIVE BOX
Diagnosis:    Protocol/Chemo Regimen:    Day:     Pt endorsed:    Review of Systems:     Pain scale:     Diet:     Allergies    No Known Allergies    Intolerances        ANTIMICROBIALS  levoFLOXacin  Tablet 250 milliGRAM(s) Oral every 24 hours  tenofovir disoproxil fumarate (VIREAD) 300 milliGRAM(s) Oral <User Schedule>      HEME/ONC MEDICATIONS  hydroxyurea 1000 milliGRAM(s) Oral every 8 hours      STANDING MEDICATIONS  allopurinol 300 milliGRAM(s) Oral daily  amLODIPine   Tablet 5 milliGRAM(s) Oral daily  AQUAPHOR (petrolatum Ointment) 1 Application(s) Topical two times a day  benzonatate 100 milliGRAM(s) Oral three times a day  Biotene Dry Mouth Oral Rinse 5 milliLiter(s) Swish and Spit five times a day  bisoprolol   Tablet 5 milliGRAM(s) Oral daily  chlorhexidine 2% Cloths 1 Application(s) Topical <User Schedule>  dextrose 5%. 1000 milliLiter(s) IV Continuous <Continuous>  dextrose 50% Injectable 12.5 Gram(s) IV Push once  dextrose 50% Injectable 25 Gram(s) IV Push once  dextrose 50% Injectable 25 Gram(s) IV Push once  docusate sodium 100 milliGRAM(s) Oral three times a day  finasteride 5 milliGRAM(s) Oral daily  influenza   Vaccine 0.5 milliLiter(s) IntraMuscular once  insulin lispro (HumaLOG) corrective regimen sliding scale   SubCutaneous three times a day before meals  insulin lispro (HumaLOG) corrective regimen sliding scale   SubCutaneous at bedtime  lidocaine 2% Injectable 1 Vial(s) Local Injection once  melatonin 5 milliGRAM(s) Oral at bedtime  nicotine - 21 mG/24Hr(s) Patch 1 patch Transdermal daily  polyethylene glycol 3350 17 Gram(s) Oral daily  pramoxine 1% Topical Lotion 1 Application(s) Topical two times a day  senna 2 Tablet(s) Oral at bedtime  simvastatin 10 milliGRAM(s) Oral at bedtime  sodium chloride 0.9%. 1000 milliLiter(s) IV Continuous <Continuous>  tamsulosin 0.4 milliGRAM(s) Oral at bedtime  triamcinolone 0.1% Ointment 1 Application(s) Topical two times a day      PRN MEDICATIONS  acetaminophen   Tablet .. 650 milliGRAM(s) Oral every 6 hours PRN  dextrose 40% Gel 15 Gram(s) Oral once PRN  glucagon  Injectable 1 milliGRAM(s) IntraMuscular once PRN  morphine  - Injectable 2 milliGRAM(s) IV Push every 4 hours PRN  zolpidem 5 milliGRAM(s) Oral at bedtime PRN        Vital Signs Last 24 Hrs  T(C): 37.7 (28 Sep 2019 05:48), Max: 37.7 (28 Sep 2019 05:48)  T(F): 99.9 (28 Sep 2019 05:48), Max: 99.9 (28 Sep 2019 05:48)  HR: 91 (28 Sep 2019 05:48) (70 - 98)  BP: 114/50 (28 Sep 2019 05:48) (93/43 - 130/53)  BP(mean): --  RR: 18 (28 Sep 2019 05:48) (18 - 20)  SpO2: 97% (28 Sep 2019 05:48) (96% - 100%)    PHYSICAL EXAM  General: NAD  HEENT: PERRLA, EOMOI, clear oropharynx, anicteric sclera, pink conjunctiva  Neck: supple  CV: (+) S1/S2 RRR  Lungs: clear to auscultation, no wheezes or rales  Abdomen: soft, non-tender, non-distended (+) BS  Ext: no clubbing, cyanosis or edema  Skin: no rashes and no petechiae  Neuro: alert and oriented X 3, no focal deficits  Central Line:     RECENT CULTURES:  09-25 @ 19:05  .Blood  --  --  --    No growth to date.  --  09-25 @ 16:31  .Urine  --  --  --    No growth  --  09-25 @ 15:45  .Blood  --  --  --    No growth to date.  --  09-23 @ 09:55  .Urine  --  --  --    No growth  --  09-23 @ 01:11  .Blood  --  --  --    No growth at 5 days.  --        LABS:                        x      11.1  )-----------( 48       ( 28 Sep 2019 07:05 )             21.3         Mean Cell Volume : 91.5 fl  Mean Cell Hemoglobin : 27.6 pg  Mean Cell Hemoglobin Concentration : 30.1 gm/dL  Auto Neutrophil # : x  Auto Lymphocyte # : x  Auto Monocyte # : x  Auto Eosinophil # : x  Auto Basophil # : x  Auto Neutrophil % : x  Auto Lymphocyte % : x  Auto Monocyte % : x  Auto Eosinophil % : x  Auto Basophil % : x      09-27    133<L>  |  104  |  44<H>  ----------------------------<  174<H>  4.5   |  17<L>  |  1.46<H>    Ca    8.1<L>      27 Sep 2019 07:19  Phos  6.0     09-27  Mg     2.2     09-27    TPro  5.6<L>  /  Alb  2.9<L>  /  TBili  0.6  /  DBili  x   /  AST  20  /  ALT  12  /  AlkPhos  113  09-27          PT/INR - ( 27 Sep 2019 07:19 )   PT: 13.0 sec;   INR: 1.14 ratio         PTT - ( 27 Sep 2019 07:19 )  PTT:33.5 sec        RADIOLOGY & ADDITIONAL STUDIES: Diagnosis: CMMoL    Protocol/Chemo Regimen: TBD    : Shayy Varela, wife refusing [place]     Day: N/A     Pt endorsed: abdominal discomfort,  " gasiness", chest tightness    Review of Systems: Patient denies headache, dizziness, visual changes, chest pain, palpitations, SOB, nausea, vomiting, diarrhea or dysuria.    Pain scale: Denies pain                                   Diet: Consistent Carb    Allergies: No Known Allergies    ANTIMICROBIALS  levoFLOXacin  Tablet 250 milliGRAM(s) Oral every 24 hours  tenofovir disoproxil fumarate (VIREAD) 300 milliGRAM(s) Oral <User Schedule>    HEME/ONC MEDICATIONS  hydroxyurea 1000 milliGRAM(s) Oral every 8 hours    STANDING MEDICATIONS  allopurinol 300 milliGRAM(s) Oral daily  amLODIPine   Tablet 5 milliGRAM(s) Oral daily  AQUAPHOR (petrolatum Ointment) 1 Application(s) Topical two times a day  benzonatate 100 milliGRAM(s) Oral three times a day  Biotene Dry Mouth Oral Rinse 5 milliLiter(s) Swish and Spit five times a day  bisoprolol   Tablet 5 milliGRAM(s) Oral daily  chlorhexidine 2% Cloths 1 Application(s) Topical <User Schedule>  dextrose 5%. 1000 milliLiter(s) IV Continuous <Continuous>  dextrose 50% Injectable 12.5 Gram(s) IV Push once  dextrose 50% Injectable 25 Gram(s) IV Push once  dextrose 50% Injectable 25 Gram(s) IV Push once  docusate sodium 100 milliGRAM(s) Oral three times a day  finasteride 5 milliGRAM(s) Oral daily  influenza   Vaccine 0.5 milliLiter(s) IntraMuscular once  insulin lispro (HumaLOG) corrective regimen sliding scale   SubCutaneous three times a day before meals  insulin lispro (HumaLOG) corrective regimen sliding scale   SubCutaneous at bedtime  lidocaine 2% Injectable 1 Vial(s) Local Injection once  melatonin 5 milliGRAM(s) Oral at bedtime  nicotine - 21 mG/24Hr(s) Patch 1 patch Transdermal daily  polyethylene glycol 3350 17 Gram(s) Oral daily  pramoxine 1% Topical Lotion 1 Application(s) Topical two times a day  senna 2 Tablet(s) Oral at bedtime  simvastatin 10 milliGRAM(s) Oral at bedtime  sodium chloride 0.9%. 1000 milliLiter(s) IV Continuous <Continuous>  tamsulosin 0.4 milliGRAM(s) Oral at bedtime  triamcinolone 0.1% Ointment 1 Application(s) Topical two times a day    PRN MEDICATIONS  acetaminophen   Tablet .. 650 milliGRAM(s) Oral every 6 hours PRN  dextrose 40% Gel 15 Gram(s) Oral once PRN  glucagon  Injectable 1 milliGRAM(s) IntraMuscular once PRN  morphine  - Injectable 2 milliGRAM(s) IV Push every 4 hours PRN  zolpidem 5 milliGRAM(s) Oral at bedtime PRN    Vital Signs Last 24 Hrs  T(C): 37.7 (28 Sep 2019 05:48), Max: 37.7 (28 Sep 2019 05:48)  T(F): 99.9 (28 Sep 2019 05:48), Max: 99.9 (28 Sep 2019 05:48)  HR: 91 (28 Sep 2019 05:48) (70 - 98)  BP: 114/50 (28 Sep 2019 05:48) (93/43 - 130/53)  BP(mean): --  RR: 18 (28 Sep 2019 05:48) (18 - 20)  SpO2: 97% (28 Sep 2019 05:48) (96% - 100%)    PHYSICAL EXAM   General: NAD  HEENT: PERRLA, EOMI, clear oropharynx  Neck: supple  CV: (+) S1/S2 RRR  Lungs: clear to auscultation, no wheezes or rales  Abdomen: soft, non-tender, non-distended (+) hypoactive BS, Dressing + in epigastric area.  Ext: no edema  Skin: no rashes   Neuro: alert and oriented X 3, no focal deficits  Central Line: C/D/I    RECENT CULTURES:  09-25 @ 19:05  .Blood  No growth to date.    09-25 @ 16:31  .Urine  No growth    09-25 @ 15:45  .Blood  No growth to date.    09-23 @ 09:55  .Urine  No growth    09-23 @ 01:11  .Blood  No growth at 5 days.      LABS:                            6.4    11.1  )-----------( 48       ( 28 Sep 2019 07:05 )             21.3     Mean Cell Volume : 91.5 fl  Mean Cell Hemoglobin : 27.6 pg  Mean Cell Hemoglobin Concentration : 30.1 gm/dL  Auto Neutrophil # : 8.0 K/uL  Auto Lymphocyte # : 0.9 K/uL  Auto Monocyte # : 2.0 K/uL  Auto Eosinophil # : 0.1 K/uL  Auto Basophil # : 0.1 K/uL  Auto Neutrophil % : 72.0 %  Auto Lymphocyte % : 4.0 %  Auto Monocyte % : 14.0 %  Auto Eosinophil % : 1.0 %  Auto Basophil % : 0.0 %    09-28    134<L>  |  106  |  41<H>  ----------------------------<  182<H>  5.0   |  15<L>  |  1.43<H>    Ca    8.2<L>      28 Sep 2019 07:04  Phos  5.2     09-28  Mg     2.1     09-28    TPro  5.3<L>  /  Alb  3.0<L>  /  TBili  0.7  /  DBili  x   /  AST  17  /  ALT  11  /  AlkPhos  104  09-28  Mg 2.1  Phos 5.2  PT/INR - ( 27 Sep 2019 07:19 )   PT: 13.0 sec;   INR: 1.14 ratio    PTT - ( 27 Sep 2019 07:19 )  PTT:33.5 sec  LDH 1149  Uric Acid 4.6    RADIOLOGY & ADDITIONAL STUDIES:   CT Abdomen and Pelvis w/ Oral Cont and w/ IV Cont (09.24.19 @ 22:26) >  Hepatomegaly, with bilobar hypodense lesions, suspicious for metastatic   disease.    Splenomegaly, with multiple splenic infarcts.    Retroperitoneal lymphadenopathy.    Enlarged prostate gland.    A 4.8 cm infrarenal abdominal aortic aneurysm, status post bifurcated   aortic stent graft.

## 2019-09-28 NOTE — PROGRESS NOTE ADULT - PROBLEM SELECTOR PLAN 6
Pletal on hold for Mediport placement  Will potentially likely hold after placement for worsening thrombocytopenia

## 2019-09-28 NOTE — PROGRESS NOTE ADULT - PROBLEM SELECTOR PLAN 9
No pharmacologic ppx 2/2 port placement and worsening thrombocytopenia  Encourage ambulation      Contact info [phone]

## 2019-09-28 NOTE — PROGRESS NOTE ADULT - PROBLEM SELECTOR PLAN 3
with metastasis to the liver diagnosed by needle bx in [facility]  CT C/A/P shows 4.3 x 3.4 cm mass posterior left upper lobe. 1.3 cm anterior right   upper lobe nodule. These are likely neoplastic. Multiple hypodense lesions within the liver likely metastatic  Will likely need to manage acute leukemia before treating Lung CA, however will have IR bx liver to establish origin scheduled for today

## 2019-09-28 NOTE — PROGRESS NOTE ADULT - PROBLEM SELECTOR PLAN 1
9/23 Status post BM bx  Continue Hydrea  Monitor CBC/Lytes and transfuse/replete PRN  Anemia: HGB 6.9, 1 unit of PRBCs ordered  Hyperphosphatemia: Phosph 6.0, Phoslo ordered  TLS labs q 12  Strict Is and Os/daily weights/Mouth Care  Antiemetics   IVF  Continue Allopurinol 300 mg daily  Mediport placement scheduled today 9/23 Status post BM bx  Hydrea dose reduced to 500mg Q12hrs  Monitor CBC/Lytes and transfuse/replete PRN  Anemia: HGB 6.4, transfuse  unit of PRBCs today.  Hyperphosphatemia: Phos 5.2  Phoslo ordered  Monitor TLS labs q 12  Strict Is and Os/daily weights/Mouth Care  Antiemetics   IVF  Continue Allopurinol 300 mg daily  s/p Liver biopsy on 9/27.  Abdominal discomfort- will f/u with abdomianl x- ray.  Chest tightness - f/u with CXR.

## 2019-09-28 NOTE — PROGRESS NOTE ADULT - PROBLEM SELECTOR PLAN 2
The patient is not neutropenic, afebrile  If febrile Pan Cx and CXR  Continue Levaquin, if decompensates start Cefepime  Hep B Core (+) continue Viread, PCR (-)  QuantiFeron Gold indeterminate on blood work, spoke to dr. Parham from ID who states no acute interventions at this time, to be followed up prior to chemo treatment

## 2019-09-28 NOTE — PROGRESS NOTE ADULT - ASSESSMENT
This is an 83 yo M with PMHx of T2DM, HTN, PVD, CAD s/p recent stent x 3 (9/19), AAA with stent, current smoker with newly diagnosis of Lung CA with mets to the liver (Lung bx completed in [place], currently not receiving tx) admitted for management of acute leukemia. Patient is status post BM bx completed 9/23, revealing CMMoL.

## 2019-09-28 NOTE — PROGRESS NOTE ADULT - PROBLEM SELECTOR PLAN 4
Status post stent x 3 in 9/2019  ASA 81 mg QD on hold for central line placement  Continue Bisoprolol 5mg PO daily Status post stent x 3 in 9/2019  ASA 81 mg QD on hold.  Continue Bisoprolol 5mg PO daily

## 2019-09-29 DIAGNOSIS — R10.9 UNSPECIFIED ABDOMINAL PAIN: ICD-10-CM

## 2019-09-29 LAB
ALBUMIN SERPL ELPH-MCNC: 3 G/DL — LOW (ref 3.3–5)
ALP SERPL-CCNC: 96 U/L — SIGNIFICANT CHANGE UP (ref 40–120)
ALT FLD-CCNC: 9 U/L — LOW (ref 10–45)
ANION GAP SERPL CALC-SCNC: 11 MMOL/L — SIGNIFICANT CHANGE UP (ref 5–17)
AST SERPL-CCNC: 11 U/L — SIGNIFICANT CHANGE UP (ref 10–40)
BASOPHILS # BLD AUTO: 0.1 K/UL — SIGNIFICANT CHANGE UP (ref 0–0.2)
BILIRUB SERPL-MCNC: 0.8 MG/DL — SIGNIFICANT CHANGE UP (ref 0.2–1.2)
BUN SERPL-MCNC: 35 MG/DL — HIGH (ref 7–23)
CALCIUM SERPL-MCNC: 7.9 MG/DL — LOW (ref 8.4–10.5)
CHLORIDE SERPL-SCNC: 103 MMOL/L — SIGNIFICANT CHANGE UP (ref 96–108)
CO2 SERPL-SCNC: 16 MMOL/L — LOW (ref 22–31)
CREAT SERPL-MCNC: 1.34 MG/DL — HIGH (ref 0.5–1.3)
EOSINOPHIL # BLD AUTO: 0.1 K/UL — SIGNIFICANT CHANGE UP (ref 0–0.5)
EOSINOPHIL NFR BLD AUTO: 4 % — SIGNIFICANT CHANGE UP (ref 0–6)
GLUCOSE BLDC GLUCOMTR-MCNC: 140 MG/DL — HIGH (ref 70–99)
GLUCOSE BLDC GLUCOMTR-MCNC: 203 MG/DL — HIGH (ref 70–99)
GLUCOSE BLDC GLUCOMTR-MCNC: 207 MG/DL — HIGH (ref 70–99)
GLUCOSE BLDC GLUCOMTR-MCNC: 272 MG/DL — HIGH (ref 70–99)
GLUCOSE SERPL-MCNC: 167 MG/DL — HIGH (ref 70–99)
HCT VFR BLD CALC: 19.9 % — CRITICAL LOW (ref 39–50)
HGB BLD-MCNC: 6.3 G/DL — CRITICAL LOW (ref 13–17)
LDH SERPL L TO P-CCNC: 874 U/L — HIGH (ref 50–242)
LYMPHOCYTES # BLD AUTO: 0.8 K/UL — LOW (ref 1–3.3)
LYMPHOCYTES # BLD AUTO: 9 % — LOW (ref 13–44)
MAGNESIUM SERPL-MCNC: 2 MG/DL — SIGNIFICANT CHANGE UP (ref 1.6–2.6)
MCHC RBC-ENTMCNC: 28.6 PG — SIGNIFICANT CHANGE UP (ref 27–34)
MCHC RBC-ENTMCNC: 31.6 GM/DL — LOW (ref 32–36)
MCV RBC AUTO: 90.8 FL — SIGNIFICANT CHANGE UP (ref 80–100)
MONOCYTES # BLD AUTO: 1.3 K/UL — HIGH (ref 0–0.9)
MONOCYTES NFR BLD AUTO: 19 % — HIGH (ref 2–14)
NEUTROPHILS # BLD AUTO: 4.9 K/UL — SIGNIFICANT CHANGE UP (ref 1.8–7.4)
NEUTROPHILS NFR BLD AUTO: 60 % — SIGNIFICANT CHANGE UP (ref 43–77)
OSMOLALITY SERPL: 293 MOSMOL/KG — SIGNIFICANT CHANGE UP (ref 280–301)
PHOSPHATE SERPL-MCNC: 3.7 MG/DL — SIGNIFICANT CHANGE UP (ref 2.5–4.5)
PLATELET # BLD AUTO: 32 K/UL — LOW (ref 150–400)
POTASSIUM SERPL-MCNC: 4.3 MMOL/L — SIGNIFICANT CHANGE UP (ref 3.5–5.3)
POTASSIUM SERPL-SCNC: 4.3 MMOL/L — SIGNIFICANT CHANGE UP (ref 3.5–5.3)
PROT SERPL-MCNC: 5.2 G/DL — LOW (ref 6–8.3)
RBC # BLD: 2.19 M/UL — LOW (ref 4.2–5.8)
RBC # FLD: 17 % — HIGH (ref 10.3–14.5)
SODIUM SERPL-SCNC: 130 MMOL/L — LOW (ref 135–145)
URATE SERPL-MCNC: 3.4 MG/DL — SIGNIFICANT CHANGE UP (ref 3.4–8.8)
WBC # BLD: 7.2 K/UL — SIGNIFICANT CHANGE UP (ref 3.8–10.5)
WBC # FLD AUTO: 7.2 K/UL — SIGNIFICANT CHANGE UP (ref 3.8–10.5)

## 2019-09-29 PROCEDURE — 74177 CT ABD & PELVIS W/CONTRAST: CPT | Mod: 26

## 2019-09-29 PROCEDURE — 99233 SBSQ HOSP IP/OBS HIGH 50: CPT

## 2019-09-29 RX ORDER — SIMETHICONE 80 MG/1
80 TABLET, CHEWABLE ORAL EVERY 6 HOURS
Refills: 0 | Status: DISCONTINUED | OUTPATIENT
Start: 2019-09-29 | End: 2019-10-02

## 2019-09-29 RX ADMIN — Medication 650 MILLIGRAM(S): at 07:45

## 2019-09-29 RX ADMIN — PRAMOXINE HYDROCHLORIDE 1 APPLICATION(S): 150 AEROSOL, FOAM RECTAL at 17:59

## 2019-09-29 RX ADMIN — Medication 100 MILLIGRAM(S): at 21:33

## 2019-09-29 RX ADMIN — MORPHINE SULFATE 2 MILLIGRAM(S): 50 CAPSULE, EXTENDED RELEASE ORAL at 10:08

## 2019-09-29 RX ADMIN — Medication 3: at 13:20

## 2019-09-29 RX ADMIN — Medication 1 APPLICATION(S): at 18:00

## 2019-09-29 RX ADMIN — Medication 100 MILLIGRAM(S): at 06:14

## 2019-09-29 RX ADMIN — Medication 5 MILLILITER(S): at 17:59

## 2019-09-29 RX ADMIN — SENNA PLUS 2 TABLET(S): 8.6 TABLET ORAL at 21:33

## 2019-09-29 RX ADMIN — Medication 650 MILLIGRAM(S): at 08:15

## 2019-09-29 RX ADMIN — Medication 300 MILLIGRAM(S): at 13:19

## 2019-09-29 RX ADMIN — FINASTERIDE 5 MILLIGRAM(S): 5 TABLET, FILM COATED ORAL at 13:19

## 2019-09-29 RX ADMIN — Medication 2: at 17:58

## 2019-09-29 RX ADMIN — POLYETHYLENE GLYCOL 3350 17 GRAM(S): 17 POWDER, FOR SOLUTION ORAL at 10:21

## 2019-09-29 RX ADMIN — PRAMOXINE HYDROCHLORIDE 1 APPLICATION(S): 150 AEROSOL, FOAM RECTAL at 06:15

## 2019-09-29 RX ADMIN — CHLORHEXIDINE GLUCONATE 1 APPLICATION(S): 213 SOLUTION TOPICAL at 08:57

## 2019-09-29 RX ADMIN — Medication 1 APPLICATION(S): at 06:36

## 2019-09-29 RX ADMIN — Medication 667 MILLIGRAM(S): at 13:19

## 2019-09-29 RX ADMIN — SODIUM CHLORIDE 75 MILLILITER(S): 9 INJECTION INTRAMUSCULAR; INTRAVENOUS; SUBCUTANEOUS at 06:17

## 2019-09-29 RX ADMIN — Medication 5 MILLILITER(S): at 13:19

## 2019-09-29 RX ADMIN — Medication 100 MILLIGRAM(S): at 13:19

## 2019-09-29 RX ADMIN — SIMETHICONE 80 MILLIGRAM(S): 80 TABLET, CHEWABLE ORAL at 08:56

## 2019-09-29 RX ADMIN — Medication 1 APPLICATION(S): at 06:15

## 2019-09-29 RX ADMIN — TAMSULOSIN HYDROCHLORIDE 0.4 MILLIGRAM(S): 0.4 CAPSULE ORAL at 21:33

## 2019-09-29 RX ADMIN — HYDROXYUREA 500 MILLIGRAM(S): 500 CAPSULE ORAL at 18:09

## 2019-09-29 RX ADMIN — Medication 5 MILLILITER(S): at 07:47

## 2019-09-29 RX ADMIN — HYDROXYUREA 500 MILLIGRAM(S): 500 CAPSULE ORAL at 06:14

## 2019-09-29 RX ADMIN — Medication 2: at 08:56

## 2019-09-29 RX ADMIN — Medication 667 MILLIGRAM(S): at 09:58

## 2019-09-29 RX ADMIN — SIMVASTATIN 10 MILLIGRAM(S): 20 TABLET, FILM COATED ORAL at 21:33

## 2019-09-29 RX ADMIN — MORPHINE SULFATE 2 MILLIGRAM(S): 50 CAPSULE, EXTENDED RELEASE ORAL at 10:38

## 2019-09-29 RX ADMIN — Medication 5 MILLILITER(S): at 21:41

## 2019-09-29 RX ADMIN — ZOLPIDEM TARTRATE 5 MILLIGRAM(S): 10 TABLET ORAL at 21:33

## 2019-09-29 NOTE — PROGRESS NOTE ADULT - PROBLEM SELECTOR PLAN 8
Chronic rash to upper back and feet improving  No primary lesions appreciated on exam, all appear secondary to scratching  Can be 2/2 underlying malignancy, CKD or obstructive process if the liver  Continue Vaseline and Triamcinolone ointment and Sarna cream   Dermatology following HgA1c  FS Ac & HS with HISS   Consistent carb diet

## 2019-09-29 NOTE — PROVIDER CONTACT NOTE (CRITICAL VALUE NOTIFICATION) - RECOMMENDATIONS
N/a
Count trending down
PRBC transfusion
Will continue to monitor.
give Tylenol per orders
n/a
will continue to monitor.
Make MD aware. Pt currently on IV ABX at this time.

## 2019-09-29 NOTE — PROGRESS NOTE ADULT - SUBJECTIVE AND OBJECTIVE BOX
Diagnosis: CMMoL    Protocol/Chemo Regimen: TBD    : Shayy Varela, wife refusing [place]     Day: N/A     Pt endorsed: abdominal discomfort,  " gasiness",     Review of Systems: Patient denies headache, dizziness, visual changes, chest pain, palpitations, SOB, nausea, vomiting, diarrhea or dysuria.    Pain scale: Denies pain                                   Diet: Consistent Carb      Allergies    No Known Allergies    Intolerances        ANTIMICROBIALS  levoFLOXacin  Tablet 250 milliGRAM(s) Oral every 24 hours  tenofovir disoproxil fumarate (VIREAD) 300 milliGRAM(s) Oral <User Schedule>      HEME/ONC MEDICATIONS  hydroxyurea 500 milliGRAM(s) Oral every 12 hours      STANDING MEDICATIONS  allopurinol 300 milliGRAM(s) Oral daily  amLODIPine   Tablet 5 milliGRAM(s) Oral daily  AQUAPHOR (petrolatum Ointment) 1 Application(s) Topical two times a day  benzonatate 100 milliGRAM(s) Oral three times a day  Biotene Dry Mouth Oral Rinse 5 milliLiter(s) Swish and Spit five times a day  bisoprolol   Tablet 5 milliGRAM(s) Oral daily  calcium acetate 667 milliGRAM(s) Oral four times a day with meals  chlorhexidine 2% Cloths 1 Application(s) Topical <User Schedule>  dextrose 5%. 1000 milliLiter(s) IV Continuous <Continuous>  dextrose 50% Injectable 12.5 Gram(s) IV Push once  dextrose 50% Injectable 25 Gram(s) IV Push once  dextrose 50% Injectable 25 Gram(s) IV Push once  docusate sodium 100 milliGRAM(s) Oral three times a day  finasteride 5 milliGRAM(s) Oral daily  influenza   Vaccine 0.5 milliLiter(s) IntraMuscular once  insulin lispro (HumaLOG) corrective regimen sliding scale   SubCutaneous three times a day before meals  insulin lispro (HumaLOG) corrective regimen sliding scale   SubCutaneous at bedtime  lidocaine 2% Injectable 1 Vial(s) Local Injection once  melatonin 5 milliGRAM(s) Oral at bedtime  nicotine - 21 mG/24Hr(s) Patch 1 patch Transdermal daily  polyethylene glycol 3350 17 Gram(s) Oral daily  pramoxine 1% Topical Lotion 1 Application(s) Topical two times a day  senna 2 Tablet(s) Oral at bedtime  simvastatin 10 milliGRAM(s) Oral at bedtime  sodium chloride 0.9%. 1000 milliLiter(s) IV Continuous <Continuous>  tamsulosin 0.4 milliGRAM(s) Oral at bedtime  triamcinolone 0.1% Ointment 1 Application(s) Topical two times a day      PRN MEDICATIONS  acetaminophen   Tablet .. 650 milliGRAM(s) Oral every 6 hours PRN  dextrose 40% Gel 15 Gram(s) Oral once PRN  glucagon  Injectable 1 milliGRAM(s) IntraMuscular once PRN  morphine  - Injectable 2 milliGRAM(s) IV Push every 4 hours PRN  simethicone 80 milliGRAM(s) Chew every 6 hours PRN  zolpidem 5 milliGRAM(s) Oral at bedtime PRN        Vital Signs Last 24 Hrs  T(C): 36.3 (29 Sep 2019 05:00), Max: 36.4 (28 Sep 2019 20:20)  T(F): 97.3 (29 Sep 2019 05:00), Max: 97.5 (28 Sep 2019 20:20)  HR: 84 (29 Sep 2019 05:00) (79 - 101)  BP: 114/56 (29 Sep 2019 05:00) (93/48 - 122/57)  BP(mean): --  RR: 18 (29 Sep 2019 05:00) (18 - 20)  SpO2: 97% (29 Sep 2019 05:00) (97% - 100%)    PHYSICAL EXAM  General: NAD  HEENT:  clear oropharynx, anicteric sclera  CV: (+) S1/S2 RRR  Lungs: clear to auscultation, no wheezes or rales  Abdomen: soft, non-tender, non-distended (+) BS x 4Q, Epigastric area surgical dressing in place   Ext: no edema  Skin: no rash  Neuro: alert and oriented X 3  Central Line: Mediport CDI           LABS:                        6.3    7.2   )-----------( 32       ( 29 Sep 2019 07:15 )             19.9         Mean Cell Volume : 90.8 fl  Mean Cell Hemoglobin : 28.6 pg  Mean Cell Hemoglobin Concentration : 31.6 gm/dL  Auto Neutrophil # : x  Auto Lymphocyte # : x  Auto Monocyte # : x  Auto Eosinophil # : x  Auto Basophil # : x  Auto Neutrophil % : x  Auto Lymphocyte % : x  Auto Monocyte % : x  Auto Eosinophil % : x  Auto Basophil % : x      09-29    130<L>  |  103  |  35<H>  ----------------------------<  167<H>  4.3   |  16<L>  |  1.34<H>    Ca    7.9<L>      29 Sep 2019 07:13  Phos  3.7     09-29  Mg     2.0     09-29    TPro  5.2<L>  /  Alb  3.0<L>  /  TBili  0.8  /  DBili  x   /  AST  11  /  ALT  9<L>  /  AlkPhos  96  09-29      Mg 2.0  Phos 3.7            Uric Acid 3.4        RECENT CULTURES:  09-25 @ 19:05  .Blood      No growth to date.  --  09-25 @ 16:31  .Urine      No growth  --  09-25 @ 15:45  .Blood    No growth to date.  --  09-23 @ 09:55  .Urine      No growth  --  09-23 @ 01:11  .Blood      No growth at 5 days.  --      RADIOLOGY & ADDITIONAL STUDIES:   Xray Abdomen 1 View PORTABLE -Urgent (09.28.19 @ 11:00) >    IMPRESSION:     Nonobstructive bowel gas pattern.     Xray Chest 1 View- PORTABLE-Urgent (09.28.19 @ 10:58)     IMPRESSION:     Right chest wall chemotherapy port tip in the SVC. Redemonstrated left   upper lobe mass, better characterized on CT chest 9/23/2019. Diagnosis: CMMoL    Protocol/Chemo Regimen: TBD    : Shayy Varela, wife refusing [place]     Day: N/A     Pt endorsed: abdominal discomfort, constipation     Review of Systems: Patient denies headache, dizziness, visual changes, chest pain, palpitations, SOB, nausea, vomiting, diarrhea, dysuria.    Pain scale: Denies pain                                   Diet: Consistent Carb      Allergies    No Known Allergies    Intolerances        ANTIMICROBIALS  levoFLOXacin  Tablet 250 milliGRAM(s) Oral every 24 hours  tenofovir disoproxil fumarate (VIREAD) 300 milliGRAM(s) Oral <User Schedule>      HEME/ONC MEDICATIONS  hydroxyurea 500 milliGRAM(s) Oral every 12 hours      STANDING MEDICATIONS  allopurinol 300 milliGRAM(s) Oral daily  amLODIPine   Tablet 5 milliGRAM(s) Oral daily  AQUAPHOR (petrolatum Ointment) 1 Application(s) Topical two times a day  benzonatate 100 milliGRAM(s) Oral three times a day  Biotene Dry Mouth Oral Rinse 5 milliLiter(s) Swish and Spit five times a day  bisoprolol   Tablet 5 milliGRAM(s) Oral daily  calcium acetate 667 milliGRAM(s) Oral four times a day with meals  chlorhexidine 2% Cloths 1 Application(s) Topical <User Schedule>  dextrose 5%. 1000 milliLiter(s) IV Continuous <Continuous>  dextrose 50% Injectable 12.5 Gram(s) IV Push once  dextrose 50% Injectable 25 Gram(s) IV Push once  dextrose 50% Injectable 25 Gram(s) IV Push once  docusate sodium 100 milliGRAM(s) Oral three times a day  finasteride 5 milliGRAM(s) Oral daily  influenza   Vaccine 0.5 milliLiter(s) IntraMuscular once  insulin lispro (HumaLOG) corrective regimen sliding scale   SubCutaneous three times a day before meals  insulin lispro (HumaLOG) corrective regimen sliding scale   SubCutaneous at bedtime  lidocaine 2% Injectable 1 Vial(s) Local Injection once  melatonin 5 milliGRAM(s) Oral at bedtime  nicotine - 21 mG/24Hr(s) Patch 1 patch Transdermal daily  polyethylene glycol 3350 17 Gram(s) Oral daily  pramoxine 1% Topical Lotion 1 Application(s) Topical two times a day  senna 2 Tablet(s) Oral at bedtime  simvastatin 10 milliGRAM(s) Oral at bedtime  sodium chloride 0.9%. 1000 milliLiter(s) IV Continuous <Continuous>  tamsulosin 0.4 milliGRAM(s) Oral at bedtime  triamcinolone 0.1% Ointment 1 Application(s) Topical two times a day      PRN MEDICATIONS  acetaminophen   Tablet .. 650 milliGRAM(s) Oral every 6 hours PRN  dextrose 40% Gel 15 Gram(s) Oral once PRN  glucagon  Injectable 1 milliGRAM(s) IntraMuscular once PRN  morphine  - Injectable 2 milliGRAM(s) IV Push every 4 hours PRN  simethicone 80 milliGRAM(s) Chew every 6 hours PRN  zolpidem 5 milliGRAM(s) Oral at bedtime PRN        Vital Signs Last 24 Hrs  T(C): 36.3 (29 Sep 2019 05:00), Max: 36.4 (28 Sep 2019 20:20)  T(F): 97.3 (29 Sep 2019 05:00), Max: 97.5 (28 Sep 2019 20:20)  HR: 84 (29 Sep 2019 05:00) (79 - 101)  BP: 114/56 (29 Sep 2019 05:00) (93/48 - 122/57)  BP(mean): --  RR: 18 (29 Sep 2019 05:00) (18 - 20)  SpO2: 97% (29 Sep 2019 05:00) (97% - 100%)    PHYSICAL EXAM  General: NAD  HEENT:  clear oropharynx, anicteric sclera  CV: (+) S1/S2 RRR  Lungs: clear to auscultation, no wheezes or rales  Abdomen: soft, non-tender, non-distended (+) BS x 4Q, Epigastric area surgical dressing in place   Ext: no edema  Skin: no rash  Neuro: alert and oriented X 3  Central Line: Mediport CDI           LABS:                        6.3    7.2   )-----------( 32       ( 29 Sep 2019 07:15 )             19.9         Mean Cell Volume : 90.8 fl  Mean Cell Hemoglobin : 28.6 pg  Mean Cell Hemoglobin Concentration : 31.6 gm/dL  Auto Neutrophil # : x  Auto Lymphocyte # : x  Auto Monocyte # : x  Auto Eosinophil # : x  Auto Basophil # : x  Auto Neutrophil % : x  Auto Lymphocyte % : x  Auto Monocyte % : x  Auto Eosinophil % : x  Auto Basophil % : x      09-29    130<L>  |  103  |  35<H>  ----------------------------<  167<H>  4.3   |  16<L>  |  1.34<H>    Ca    7.9<L>      29 Sep 2019 07:13  Phos  3.7     09-29  Mg     2.0     09-29    TPro  5.2<L>  /  Alb  3.0<L>  /  TBili  0.8  /  DBili  x   /  AST  11  /  ALT  9<L>  /  AlkPhos  96  09-29      Mg 2.0  Phos 3.7            Uric Acid 3.4        RECENT CULTURES:  09-25 @ 19:05  .Blood      No growth to date.  --  09-25 @ 16:31  .Urine      No growth  --  09-25 @ 15:45  .Blood    No growth to date.  --  09-23 @ 09:55  .Urine      No growth  --  09-23 @ 01:11  .Blood      No growth at 5 days.  --      RADIOLOGY & ADDITIONAL STUDIES:   Xray Abdomen 1 View PORTABLE -Urgent (09.28.19 @ 11:00) >    IMPRESSION:     Nonobstructive bowel gas pattern.     Xray Chest 1 View- PORTABLE-Urgent (09.28.19 @ 10:58)     IMPRESSION:     Right chest wall chemotherapy port tip in the SVC. Redemonstrated left   upper lobe mass, better characterized on CT chest 9/23/2019. Diagnosis: CMMoL    Protocol/Chemo Regimen: TBD    : Shayy Varela, wife refusing [place]     Day: N/A     Pt endorsed: abdominal discomfort, constipation     Review of Systems: Patient denies headache, dizziness, visual changes, chest pain, palpitations, SOB, nausea, vomiting, diarrhea, dysuria.    Pain scale: 2/10 abdominal pain                                   Diet: Consistent Carb      Allergies    No Known Allergies    Intolerances        ANTIMICROBIALS  levoFLOXacin  Tablet 250 milliGRAM(s) Oral every 24 hours  tenofovir disoproxil fumarate (VIREAD) 300 milliGRAM(s) Oral <User Schedule>      HEME/ONC MEDICATIONS  hydroxyurea 500 milliGRAM(s) Oral every 12 hours      STANDING MEDICATIONS  allopurinol 300 milliGRAM(s) Oral daily  amLODIPine   Tablet 5 milliGRAM(s) Oral daily  AQUAPHOR (petrolatum Ointment) 1 Application(s) Topical two times a day  benzonatate 100 milliGRAM(s) Oral three times a day  Biotene Dry Mouth Oral Rinse 5 milliLiter(s) Swish and Spit five times a day  bisoprolol   Tablet 5 milliGRAM(s) Oral daily  calcium acetate 667 milliGRAM(s) Oral four times a day with meals  chlorhexidine 2% Cloths 1 Application(s) Topical <User Schedule>  dextrose 5%. 1000 milliLiter(s) IV Continuous <Continuous>  dextrose 50% Injectable 12.5 Gram(s) IV Push once  dextrose 50% Injectable 25 Gram(s) IV Push once  dextrose 50% Injectable 25 Gram(s) IV Push once  docusate sodium 100 milliGRAM(s) Oral three times a day  finasteride 5 milliGRAM(s) Oral daily  influenza   Vaccine 0.5 milliLiter(s) IntraMuscular once  insulin lispro (HumaLOG) corrective regimen sliding scale   SubCutaneous three times a day before meals  insulin lispro (HumaLOG) corrective regimen sliding scale   SubCutaneous at bedtime  lidocaine 2% Injectable 1 Vial(s) Local Injection once  melatonin 5 milliGRAM(s) Oral at bedtime  nicotine - 21 mG/24Hr(s) Patch 1 patch Transdermal daily  polyethylene glycol 3350 17 Gram(s) Oral daily  pramoxine 1% Topical Lotion 1 Application(s) Topical two times a day  senna 2 Tablet(s) Oral at bedtime  simvastatin 10 milliGRAM(s) Oral at bedtime  sodium chloride 0.9%. 1000 milliLiter(s) IV Continuous <Continuous>  tamsulosin 0.4 milliGRAM(s) Oral at bedtime  triamcinolone 0.1% Ointment 1 Application(s) Topical two times a day      PRN MEDICATIONS  acetaminophen   Tablet .. 650 milliGRAM(s) Oral every 6 hours PRN  dextrose 40% Gel 15 Gram(s) Oral once PRN  glucagon  Injectable 1 milliGRAM(s) IntraMuscular once PRN  morphine  - Injectable 2 milliGRAM(s) IV Push every 4 hours PRN  simethicone 80 milliGRAM(s) Chew every 6 hours PRN  zolpidem 5 milliGRAM(s) Oral at bedtime PRN        Vital Signs Last 24 Hrs  T(C): 36.3 (29 Sep 2019 05:00), Max: 36.4 (28 Sep 2019 20:20)  T(F): 97.3 (29 Sep 2019 05:00), Max: 97.5 (28 Sep 2019 20:20)  HR: 84 (29 Sep 2019 05:00) (79 - 101)  BP: 114/56 (29 Sep 2019 05:00) (93/48 - 122/57)  BP(mean): --  RR: 18 (29 Sep 2019 05:00) (18 - 20)  SpO2: 97% (29 Sep 2019 05:00) (97% - 100%)    PHYSICAL EXAM  General: NAD  HEENT:  clear oropharynx, anicteric sclera  CV: (+) S1/S2 RRR  Lungs: clear to auscultation, no wheezes or rales  Abdomen: soft, non-tender, non-distended (+) BS x 4Q, Epigastric area surgical dressing in place   Ext: no edema  Skin: no rash  Neuro: alert and oriented X 3  Central Line: Mediport CDI           LABS:                        6.3    7.2   )-----------( 32       ( 29 Sep 2019 07:15 )             19.9         Mean Cell Volume : 90.8 fl  Mean Cell Hemoglobin : 28.6 pg  Mean Cell Hemoglobin Concentration : 31.6 gm/dL  Auto Neutrophil # : x  Auto Lymphocyte # : x  Auto Monocyte # : x  Auto Eosinophil # : x  Auto Basophil # : x  Auto Neutrophil % : x  Auto Lymphocyte % : x  Auto Monocyte % : x  Auto Eosinophil % : x  Auto Basophil % : x      09-29    130<L>  |  103  |  35<H>  ----------------------------<  167<H>  4.3   |  16<L>  |  1.34<H>    Ca    7.9<L>      29 Sep 2019 07:13  Phos  3.7     09-29  Mg     2.0     09-29    TPro  5.2<L>  /  Alb  3.0<L>  /  TBili  0.8  /  DBili  x   /  AST  11  /  ALT  9<L>  /  AlkPhos  96  09-29      Mg 2.0  Phos 3.7            Uric Acid 3.4        RECENT CULTURES:  09-25 @ 19:05  .Blood      No growth to date.  --  09-25 @ 16:31  .Urine      No growth  --  09-25 @ 15:45  .Blood    No growth to date.  --  09-23 @ 09:55  .Urine      No growth  --  09-23 @ 01:11  .Blood      No growth at 5 days.  --      RADIOLOGY & ADDITIONAL STUDIES:   Xray Abdomen 1 View PORTABLE -Urgent (09.28.19 @ 11:00) >    IMPRESSION:     Nonobstructive bowel gas pattern.     Xray Chest 1 View- PORTABLE-Urgent (09.28.19 @ 10:58)     IMPRESSION:     Right chest wall chemotherapy port tip in the SVC. Redemonstrated left   upper lobe mass, better characterized on CT chest 9/23/2019.

## 2019-09-29 NOTE — PROGRESS NOTE ADULT - PROBLEM SELECTOR PLAN 9
No pharmacologic ppx 2/2 port placement and worsening thrombocytopenia  Encourage ambulation      Contact info [phone] Chronic rash to upper back and feet improving  No primary lesions appreciated on exam, all appear secondary to scratching  Can be 2/2 underlying malignancy, CKD or obstructive process if the liver  Continue Vaseline and Triamcinolone ointment and Sarna cream   Dermatology following

## 2019-09-29 NOTE — PROGRESS NOTE ADULT - PROBLEM SELECTOR PLAN 5
Continue Amlodipine 5mg PO daily and Bisoprolol 5mg PO daily  Monitor BP closely Likely due to constipation   Continue stool softeners  Follow up CT Abdomen/pelvis results

## 2019-09-29 NOTE — PROGRESS NOTE ADULT - PROBLEM SELECTOR PLAN 3
with metastasis to the liver diagnosed by needle bx in [facility]  CT C/A/P shows 4.3 x 3.4 cm mass posterior left upper lobe. 1.3 cm anterior right   upper lobe nodule. These are likely neoplastic. 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 1.3 cm anterior right   upper lobe nodule. These are likely neoplastic. Multiple hypodense lesions within the liver likely metastatic  Will likely need to manage acute leukemia before treating Lung CA,  s/p Liver biopsy on 9/27. follow up results

## 2019-09-29 NOTE — PROGRESS NOTE ADULT - PROBLEM SELECTOR PLAN 1
9/23 Status post BM bx  Hydrea dose reduced to 500mg Q12hrs  Monitor CBC/Lytes and transfuse/replete PRN  Anemia: HGB 6.4, transfuse  unit of PRBCs today.  Hyperphosphatemia: Phos 5.2  Phoslo ordered  Monitor TLS labs q 12  Strict Is and Os/daily weights/Mouth Care  Antiemetics   IVF  Continue Allopurinol 300 mg daily  s/p Liver biopsy on 9/27.  Abdominal discomfort- will f/u with abdomianl x- ray.  Chest tightness - f/u with CXR. 9/23 Status post BM bx  Hydrea dose reduced to 500mg Q12hrs  Monitor CBC/Lytes and transfuse/replete PRN  Anemia: HGB 6.3, transfuse  unit of PRBCs today.  Hyperphosphatemia: continue   Phoslo   Monitor TLS labs q 12  Strict Is and Os/daily weights/Mouth Care  Antiemetics   IVF  Continue Allopurinol 300 mg daily 9/23 Status post BM bx  Hydrea dose reduced to 500mg Q12hrs  Monitor CBC/Lytes and transfuse/replete PRN  Anemia: HGB 6.3, transfuse  unit of PRBCs today.  Monitor TLS labs q 12  Strict Is and Os/daily weights/Mouth Care  Antiemetics   IVF  Continue Allopurinol 300 mg daily  Discharge planning when stable

## 2019-09-29 NOTE — PROGRESS NOTE ADULT - PROBLEM SELECTOR PLAN 6
Pletal on hold for Mediport placement  Will potentially likely hold after placement for worsening thrombocytopenia Pletal on hold for worsening thrombocytopenia Continue Amlodipine 5mg PO daily and Bisoprolol 5mg PO daily  Monitor BP closely

## 2019-09-29 NOTE — PROGRESS NOTE ADULT - PROBLEM SELECTOR PLAN 2
The patient is not neutropenic, afebrile  If febrile Pan Cx and CXR  Continue Levaquin, if decompensates start Cefepime  Hep B Core (+) continue Viread, PCR (-)  QuantiFeron Gold indeterminate on blood work, spoke to dr. Parham from ID who states no acute interventions at this time, to be followed up prior to chemo treatment The patient is not neutropenic, afebrile  If febrile Pan Cx   9/28 CXR c/w left upper lobe mass.   Continue Levaquin, if decompensates start Cefepime  Hep B Core (+) continue Viread, PCR (-)  QuantiFeron Gold indeterminate on blood work, spoke to dr. Parham from [facility] who states no acute interventions at this time, to be followed up prior to chemo treatment

## 2019-09-29 NOTE — PROGRESS NOTE ADULT - ASSESSMENT
This is an 83 yo M with PMHx of T2DM, HTN, PVD, CAD s/p recent stent x 3 (9/19), AAA with stent, current smoker with newly diagnosis of Lung CA with mets to the liver (Lung bx completed in [place], currently not receiving tx) admitted for management of acute leukemia. Patient is status post BM bx completed 9/23, revealing CMMoL. This is an 83 yo M with PMHx of T2DM, HTN, PVD, CAD s/p recent stent x 3 (9/19), AAA with stent, current smoker with newly diagnosis of Lung CA with mets to the liver (Lung bx completed in [place], currently not receiving tx) admitted for management of acute leukemia. Patient is status post BM bx completed 9/23, revealing CMMoL. P{t has anemia and thrombocytopenia secondary to disease.

## 2019-09-29 NOTE — PROGRESS NOTE ADULT - PROBLEM SELECTOR PLAN 10
No pharmacologic ppx 2/2 port placement and worsening thrombocytopenia  Encourage ambulation      Contact info [phone]

## 2019-09-29 NOTE — PROGRESS NOTE ADULT - ATTENDING COMMENTS
83 yo M Mandarin-speaking, p/w leukocytosis also with lung masses ? 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 Cont Hydrea, decrease dose to 500mg twice daily. s/p liver bx 9/27 -pt has a second malignancy, likely metastatic lung CA with liver mets given long hx smoking  -monitor for tumor lysis syndrome -on Allopurinol  -hx CAD and stents -ASA 81mg on hold given plt>50. Echo -EF 65%  -hep B -core Ab +,  PCR pending, cont Virea. Hep C, and HIV nonreactive. Quantiferron gold indeterminate  -c/o abdominal bloating -check AXR  -dispo planning -d/c home with outpt f/u when stable 83 yo M Mandarin-speaking, p/w leukocytosis also with lung masses ? 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 Cont Hydrea, decrease dose to 500mg twice daily. s/p liver bx 9/27 -pt has a second malignancy, likely metastatic lung CA with liver mets given long hx smoking  -monitor for tumor lysis syndrome -on Allopurinol  -hx CAD and stents -ASA 81mg on hold given plt>50. Echo -EF 65%  -hep B -core Ab +,  PCR pending, cont Viread. Hep C, and HIV nonreactive. Quantiferron gold indeterminate  -c/o abdominal pain, H/H no response after 1u PRBCs yesterday -check CT A/P r/o bleed  -dispo planning -d/c home with outpt f/u when stable

## 2019-09-30 PROBLEM — C34.90 MALIGNANT NEOPLASM OF UNSPECIFIED PART OF UNSPECIFIED BRONCHUS OR LUNG: Chronic | Status: ACTIVE | Noted: 2019-09-22

## 2019-09-30 LAB
ALBUMIN SERPL ELPH-MCNC: 2.8 G/DL — LOW (ref 3.3–5)
ALP SERPL-CCNC: 99 U/L — SIGNIFICANT CHANGE UP (ref 40–120)
ALT FLD-CCNC: 9 U/L — LOW (ref 10–45)
ANION GAP SERPL CALC-SCNC: 13 MMOL/L — SIGNIFICANT CHANGE UP (ref 5–17)
APTT BLD: 34 SEC — SIGNIFICANT CHANGE UP (ref 27.5–36.3)
AST SERPL-CCNC: 11 U/L — SIGNIFICANT CHANGE UP (ref 10–40)
BASOPHILS # BLD AUTO: 0 K/UL — SIGNIFICANT CHANGE UP (ref 0–0.2)
BILIRUB SERPL-MCNC: 1 MG/DL — SIGNIFICANT CHANGE UP (ref 0.2–1.2)
BLD GP AB SCN SERPL QL: NEGATIVE — SIGNIFICANT CHANGE UP
BUN SERPL-MCNC: 28 MG/DL — HIGH (ref 7–23)
CALCIUM SERPL-MCNC: 7.9 MG/DL — LOW (ref 8.4–10.5)
CHLORIDE SERPL-SCNC: 101 MMOL/L — SIGNIFICANT CHANGE UP (ref 96–108)
CO2 SERPL-SCNC: 18 MMOL/L — LOW (ref 22–31)
CREAT SERPL-MCNC: 1.12 MG/DL — SIGNIFICANT CHANGE UP (ref 0.5–1.3)
CULTURE RESULTS: SIGNIFICANT CHANGE UP
CULTURE RESULTS: SIGNIFICANT CHANGE UP
EOSINOPHIL # BLD AUTO: 0.1 K/UL — SIGNIFICANT CHANGE UP (ref 0–0.5)
EOSINOPHIL NFR BLD AUTO: 3 % — SIGNIFICANT CHANGE UP (ref 0–6)
FIBRINOGEN PPP-MCNC: 427 MG/DL — SIGNIFICANT CHANGE UP (ref 350–510)
GLUCOSE BLDC GLUCOMTR-MCNC: 201 MG/DL — HIGH (ref 70–99)
GLUCOSE BLDC GLUCOMTR-MCNC: 204 MG/DL — HIGH (ref 70–99)
GLUCOSE BLDC GLUCOMTR-MCNC: 204 MG/DL — HIGH (ref 70–99)
GLUCOSE BLDC GLUCOMTR-MCNC: 258 MG/DL — HIGH (ref 70–99)
GLUCOSE SERPL-MCNC: 140 MG/DL — HIGH (ref 70–99)
HCT VFR BLD CALC: 23.9 % — LOW (ref 39–50)
HCT VFR BLD CALC: 25.3 % — LOW (ref 39–50)
HGB BLD-MCNC: 7.7 G/DL — LOW (ref 13–17)
HGB BLD-MCNC: 8.3 G/DL — LOW (ref 13–17)
INR BLD: 1.17 RATIO — HIGH (ref 0.88–1.16)
LDH SERPL L TO P-CCNC: 826 U/L — HIGH (ref 50–242)
LYMPHOCYTES # BLD AUTO: 0.1 K/UL — LOW (ref 1–3.3)
LYMPHOCYTES # BLD AUTO: 3 % — LOW (ref 13–44)
MAGNESIUM SERPL-MCNC: 1.8 MG/DL — SIGNIFICANT CHANGE UP (ref 1.6–2.6)
MCHC RBC-ENTMCNC: 29.5 PG — SIGNIFICANT CHANGE UP (ref 27–34)
MCHC RBC-ENTMCNC: 29.9 PG — SIGNIFICANT CHANGE UP (ref 27–34)
MCHC RBC-ENTMCNC: 32.4 GM/DL — SIGNIFICANT CHANGE UP (ref 32–36)
MCHC RBC-ENTMCNC: 32.7 GM/DL — SIGNIFICANT CHANGE UP (ref 32–36)
MCV RBC AUTO: 91 FL — SIGNIFICANT CHANGE UP (ref 80–100)
MCV RBC AUTO: 91.5 FL — SIGNIFICANT CHANGE UP (ref 80–100)
MONOCYTES # BLD AUTO: 0.1 K/UL — SIGNIFICANT CHANGE UP (ref 0–0.9)
MONOCYTES NFR BLD AUTO: 3 % — SIGNIFICANT CHANGE UP (ref 2–14)
NEUTROPHILS # BLD AUTO: 4.1 K/UL — SIGNIFICANT CHANGE UP (ref 1.8–7.4)
NEUTROPHILS NFR BLD AUTO: 91 % — HIGH (ref 43–77)
OB PNL STL: NEGATIVE — SIGNIFICANT CHANGE UP
PHOSPHATE SERPL-MCNC: 2.8 MG/DL — SIGNIFICANT CHANGE UP (ref 2.5–4.5)
PLATELET # BLD AUTO: 24 K/UL — LOW (ref 150–400)
PLATELET # BLD AUTO: 53 K/UL — LOW (ref 150–400)
PLATELET # BLD AUTO: 58 K/UL — LOW (ref 150–400)
POTASSIUM SERPL-MCNC: 4 MMOL/L — SIGNIFICANT CHANGE UP (ref 3.5–5.3)
POTASSIUM SERPL-SCNC: 4 MMOL/L — SIGNIFICANT CHANGE UP (ref 3.5–5.3)
PROT SERPL-MCNC: 5.2 G/DL — LOW (ref 6–8.3)
PROTHROM AB SERPL-ACNC: 13.4 SEC — HIGH (ref 10–12.9)
RBC # BLD: 2.62 M/UL — LOW (ref 4.2–5.8)
RBC # BLD: 2.76 M/UL — LOW (ref 4.2–5.8)
RBC # FLD: 16.3 % — HIGH (ref 10.3–14.5)
RBC # FLD: 16.8 % — HIGH (ref 10.3–14.5)
RH IG SCN BLD-IMP: POSITIVE — SIGNIFICANT CHANGE UP
SODIUM SERPL-SCNC: 132 MMOL/L — LOW (ref 135–145)
SPECIMEN SOURCE: SIGNIFICANT CHANGE UP
SPECIMEN SOURCE: SIGNIFICANT CHANGE UP
URATE SERPL-MCNC: 2.3 MG/DL — LOW (ref 3.4–8.8)
WBC # BLD: 4.5 K/UL — SIGNIFICANT CHANGE UP (ref 3.8–10.5)
WBC # BLD: 5.7 K/UL — SIGNIFICANT CHANGE UP (ref 3.8–10.5)
WBC # FLD AUTO: 4.5 K/UL — SIGNIFICANT CHANGE UP (ref 3.8–10.5)
WBC # FLD AUTO: 5.7 K/UL — SIGNIFICANT CHANGE UP (ref 3.8–10.5)

## 2019-09-30 PROCEDURE — 99232 SBSQ HOSP IP/OBS MODERATE 35: CPT | Mod: GC

## 2019-09-30 PROCEDURE — 99231 SBSQ HOSP IP/OBS SF/LOW 25: CPT

## 2019-09-30 RX ORDER — PHYTONADIONE (VIT K1) 5 MG
10 TABLET ORAL DAILY
Refills: 0 | Status: COMPLETED | OUTPATIENT
Start: 2019-09-30 | End: 2019-10-02

## 2019-09-30 RX ADMIN — BISOPROLOL FUMARATE 5 MILLIGRAM(S): 10 TABLET, FILM COATED ORAL at 06:06

## 2019-09-30 RX ADMIN — FINASTERIDE 5 MILLIGRAM(S): 5 TABLET, FILM COATED ORAL at 13:19

## 2019-09-30 RX ADMIN — ZOLPIDEM TARTRATE 5 MILLIGRAM(S): 10 TABLET ORAL at 22:18

## 2019-09-30 RX ADMIN — Medication 5 MILLILITER(S): at 23:08

## 2019-09-30 RX ADMIN — Medication 5 MILLILITER(S): at 22:19

## 2019-09-30 RX ADMIN — Medication 1 APPLICATION(S): at 06:09

## 2019-09-30 RX ADMIN — Medication 5 MILLILITER(S): at 08:35

## 2019-09-30 RX ADMIN — Medication 1 APPLICATION(S): at 18:15

## 2019-09-30 RX ADMIN — Medication 2: at 08:34

## 2019-09-30 RX ADMIN — AMLODIPINE BESYLATE 5 MILLIGRAM(S): 2.5 TABLET ORAL at 06:16

## 2019-09-30 RX ADMIN — Medication 100 MILLIGRAM(S): at 13:20

## 2019-09-30 RX ADMIN — Medication 100 MILLIGRAM(S): at 06:05

## 2019-09-30 RX ADMIN — Medication 100 MILLIGRAM(S): at 22:18

## 2019-09-30 RX ADMIN — Medication 100 MILLIGRAM(S): at 06:06

## 2019-09-30 RX ADMIN — SIMETHICONE 80 MILLIGRAM(S): 80 TABLET, CHEWABLE ORAL at 08:45

## 2019-09-30 RX ADMIN — Medication 2: at 18:16

## 2019-09-30 RX ADMIN — Medication 100 MILLIGRAM(S): at 22:19

## 2019-09-30 RX ADMIN — HYDROXYUREA 500 MILLIGRAM(S): 500 CAPSULE ORAL at 06:06

## 2019-09-30 RX ADMIN — PRAMOXINE HYDROCHLORIDE 1 APPLICATION(S): 150 AEROSOL, FOAM RECTAL at 06:06

## 2019-09-30 RX ADMIN — Medication 10 MILLIGRAM(S): at 18:16

## 2019-09-30 RX ADMIN — SODIUM CHLORIDE 75 MILLILITER(S): 9 INJECTION INTRAMUSCULAR; INTRAVENOUS; SUBCUTANEOUS at 06:05

## 2019-09-30 RX ADMIN — PRAMOXINE HYDROCHLORIDE 1 APPLICATION(S): 150 AEROSOL, FOAM RECTAL at 18:16

## 2019-09-30 RX ADMIN — Medication 650 MILLIGRAM(S): at 22:19

## 2019-09-30 RX ADMIN — Medication 3: at 13:19

## 2019-09-30 RX ADMIN — Medication 5 MILLILITER(S): at 13:21

## 2019-09-30 RX ADMIN — Medication 1 APPLICATION(S): at 06:07

## 2019-09-30 RX ADMIN — SENNA PLUS 2 TABLET(S): 8.6 TABLET ORAL at 22:19

## 2019-09-30 RX ADMIN — SIMVASTATIN 10 MILLIGRAM(S): 20 TABLET, FILM COATED ORAL at 22:19

## 2019-09-30 RX ADMIN — Medication 650 MILLIGRAM(S): at 20:33

## 2019-09-30 RX ADMIN — Medication 300 MILLIGRAM(S): at 13:19

## 2019-09-30 RX ADMIN — Medication 5 MILLILITER(S): at 00:05

## 2019-09-30 RX ADMIN — TAMSULOSIN HYDROCHLORIDE 0.4 MILLIGRAM(S): 0.4 CAPSULE ORAL at 22:19

## 2019-09-30 RX ADMIN — CHLORHEXIDINE GLUCONATE 1 APPLICATION(S): 213 SOLUTION TOPICAL at 06:07

## 2019-09-30 RX ADMIN — TENOFOVIR DISOPROXIL FUMARATE 300 MILLIGRAM(S): 300 TABLET, FILM COATED ORAL at 13:20

## 2019-09-30 NOTE — PROGRESS NOTE ADULT - SUBJECTIVE AND OBJECTIVE BOX
Diagnosis:    Protocol/Chemo Regimen:    Day:     Pt endorsed:    Review of Systems:     Pain scale:     Diet:     Allergies    No Known Allergies    Intolerances        ANTIMICROBIALS  levoFLOXacin  Tablet 250 milliGRAM(s) Oral every 24 hours  tenofovir disoproxil fumarate (VIREAD) 300 milliGRAM(s) Oral <User Schedule>      HEME/ONC MEDICATIONS  hydroxyurea 500 milliGRAM(s) Oral every 12 hours      STANDING MEDICATIONS  allopurinol 300 milliGRAM(s) Oral daily  amLODIPine   Tablet 5 milliGRAM(s) Oral daily  AQUAPHOR (petrolatum Ointment) 1 Application(s) Topical two times a day  benzonatate 100 milliGRAM(s) Oral three times a day  Biotene Dry Mouth Oral Rinse 5 milliLiter(s) Swish and Spit five times a day  bisoprolol   Tablet 5 milliGRAM(s) Oral daily  chlorhexidine 2% Cloths 1 Application(s) Topical <User Schedule>  dextrose 5%. 1000 milliLiter(s) IV Continuous <Continuous>  dextrose 50% Injectable 12.5 Gram(s) IV Push once  dextrose 50% Injectable 25 Gram(s) IV Push once  dextrose 50% Injectable 25 Gram(s) IV Push once  docusate sodium 100 milliGRAM(s) Oral three times a day  finasteride 5 milliGRAM(s) Oral daily  influenza   Vaccine 0.5 milliLiter(s) IntraMuscular once  insulin lispro (HumaLOG) corrective regimen sliding scale   SubCutaneous three times a day before meals  insulin lispro (HumaLOG) corrective regimen sliding scale   SubCutaneous at bedtime  lidocaine 2% Injectable 1 Vial(s) Local Injection once  melatonin 5 milliGRAM(s) Oral at bedtime  nicotine - 21 mG/24Hr(s) Patch 1 patch Transdermal daily  polyethylene glycol 3350 17 Gram(s) Oral daily  pramoxine 1% Topical Lotion 1 Application(s) Topical two times a day  senna 2 Tablet(s) Oral at bedtime  simvastatin 10 milliGRAM(s) Oral at bedtime  sodium chloride 0.9%. 1000 milliLiter(s) IV Continuous <Continuous>  tamsulosin 0.4 milliGRAM(s) Oral at bedtime  triamcinolone 0.1% Ointment 1 Application(s) Topical two times a day      PRN MEDICATIONS  acetaminophen   Tablet .. 650 milliGRAM(s) Oral every 6 hours PRN  dextrose 40% Gel 15 Gram(s) Oral once PRN  glucagon  Injectable 1 milliGRAM(s) IntraMuscular once PRN  morphine  - Injectable 2 milliGRAM(s) IV Push every 4 hours PRN  simethicone 80 milliGRAM(s) Chew every 6 hours PRN  zolpidem 5 milliGRAM(s) Oral at bedtime PRN        Vital Signs Last 24 Hrs  T(C): 36.6 (30 Sep 2019 05:50), Max: 36.7 (29 Sep 2019 10:50)  T(F): 97.8 (30 Sep 2019 05:50), Max: 98.1 (29 Sep 2019 10:50)  HR: 85 (30 Sep 2019 05:50) (72 - 95)  BP: 134/62 (30 Sep 2019 05:50) (102/47 - 134/62)  BP(mean): --  RR: 16 (30 Sep 2019 05:50) (16 - 18)  SpO2: 98% (30 Sep 2019 05:50) (97% - 100%)    PHYSICAL EXAM  General: NAD  HEENT: PERRLA, EOMOI, clear oropharynx, anicteric sclera, pink conjunctiva  Neck: supple  CV: (+) S1/S2 RRR  Lungs: clear to auscultation, no wheezes or rales  Abdomen: soft, non-tender, non-distended (+) BS  Ext: no clubbing, cyanosis or edema  Skin: no rashes and no petechiae  Neuro: alert and oriented X 3, no focal deficits  Central Line:     RECENT CULTURES:  09-25 @ 19:05  .Blood  --  --  --    No growth to date.  --  09-25 @ 16:31  .Urine  --  --  --    No growth  --  09-25 @ 15:45  .Blood  --  --  --    No growth to date.  --  09-23 @ 09:55  .Urine  --  --  --    No growth  --        LABS:                        6.3    7.2   )-----------( 32       ( 29 Sep 2019 07:15 )             19.9         Mean Cell Volume : 90.8 fl  Mean Cell Hemoglobin : 28.6 pg  Mean Cell Hemoglobin Concentration : 31.6 gm/dL  Auto Neutrophil # : 4.9 K/uL  Auto Lymphocyte # : 0.8 K/uL  Auto Monocyte # : 1.3 K/uL  Auto Eosinophil # : 0.1 K/uL  Auto Basophil # : 0.1 K/uL  Auto Neutrophil % : 60.0 %  Auto Lymphocyte % : 9.0 %  Auto Monocyte % : 19.0 %  Auto Eosinophil % : 4.0 %  Auto Basophil % : x      09-29    130<L>  |  103  |  35<H>  ----------------------------<  167<H>  4.3   |  16<L>  |  1.34<H>    Ca    7.9<L>      29 Sep 2019 07:13  Phos  3.7     09-29  Mg     2.0     09-29    TPro  5.2<L>  /  Alb  3.0<L>  /  TBili  0.8  /  DBili  x   /  AST  11  /  ALT  9<L>  /  AlkPhos  96  09-29                  RADIOLOGY & ADDITIONAL STUDIES: Diagnosis: CMMoL    Protocol/Chemo Regimen: TBD    :  wife interpreting , refusing [place] ,     Day: N/A     Pt endorsed: abdominal discomfort, constipation     Review of Systems: Patient denies headache, dizziness, visual changes, chest pain, palpitations, SOB, nausea, vomiting, diarrhea, dysuria.    Pain scale: 2/10 abdominal pain                                   Diet: Consistent Carb    Allergies: No Known Allergies    ANTIMICROBIALS  levoFLOXacin  Tablet 250 milliGRAM(s) Oral every 24 hours  tenofovir disoproxil fumarate (VIREAD) 300 milliGRAM(s) Oral <User Schedule>    HEME/ONC MEDICATIONS  hydroxyurea 500 milliGRAM(s) Oral every 12 hours    STANDING MEDICATIONS  allopurinol 300 milliGRAM(s) Oral daily  amLODIPine   Tablet 5 milliGRAM(s) Oral daily  AQUAPHOR (petrolatum Ointment) 1 Application(s) Topical two times a day  benzonatate 100 milliGRAM(s) Oral three times a day  Biotene Dry Mouth Oral Rinse 5 milliLiter(s) Swish and Spit five times a day  bisoprolol   Tablet 5 milliGRAM(s) Oral daily  chlorhexidine 2% Cloths 1 Application(s) Topical <User Schedule>  dextrose 5%. 1000 milliLiter(s) IV Continuous <Continuous>  dextrose 50% Injectable 12.5 Gram(s) IV Push once  dextrose 50% Injectable 25 Gram(s) IV Push once  dextrose 50% Injectable 25 Gram(s) IV Push once  docusate sodium 100 milliGRAM(s) Oral three times a day  finasteride 5 milliGRAM(s) Oral daily  influenza   Vaccine 0.5 milliLiter(s) IntraMuscular once  insulin lispro (HumaLOG) corrective regimen sliding scale   SubCutaneous three times a day before meals  insulin lispro (HumaLOG) corrective regimen sliding scale   SubCutaneous at bedtime  lidocaine 2% Injectable 1 Vial(s) Local Injection once  melatonin 5 milliGRAM(s) Oral at bedtime  nicotine - 21 mG/24Hr(s) Patch 1 patch Transdermal daily  polyethylene glycol 3350 17 Gram(s) Oral daily  pramoxine 1% Topical Lotion 1 Application(s) Topical two times a day  senna 2 Tablet(s) Oral at bedtime  simvastatin 10 milliGRAM(s) Oral at bedtime  sodium chloride 0.9%. 1000 milliLiter(s) IV Continuous <Continuous>  tamsulosin 0.4 milliGRAM(s) Oral at bedtime  triamcinolone 0.1% Ointment 1 Application(s) Topical two times a day    PRN MEDICATIONS  acetaminophen   Tablet .. 650 milliGRAM(s) Oral every 6 hours PRN  dextrose 40% Gel 15 Gram(s) Oral once PRN  glucagon  Injectable 1 milliGRAM(s) IntraMuscular once PRN  morphine  - Injectable 2 milliGRAM(s) IV Push every 4 hours PRN  simethicone 80 milliGRAM(s) Chew every 6 hours PRN  zolpidem 5 milliGRAM(s) Oral at bedtime PRN    Vital Signs Last 24 Hrs  T(C): 36.6 (30 Sep 2019 05:50), Max: 36.7 (29 Sep 2019 10:50)  T(F): 97.8 (30 Sep 2019 05:50), Max: 98.1 (29 Sep 2019 10:50)  HR: 85 (30 Sep 2019 05:50) (72 - 95)  BP: 134/62 (30 Sep 2019 05:50) (102/47 - 134/62)  BP(mean): --  RR: 16 (30 Sep 2019 05:50) (16 - 18)  SpO2: 98% (30 Sep 2019 05:50) (97% - 100%)    PHYSICAL EXAM  General: NAD  HEENT: PERRLA, EOMOI, clear oropharynx, anicteric sclera, pink conjunctiva  Neck: supple  CV: (+) S1/S2 RRR  Lungs: clear to auscultation, no wheezes or rales  Abdomen: soft, non-tender, non-distended (+) BS  Ext: no clubbing, cyanosis or edema  Skin: no rashes and no petechiae  Neuro: alert and oriented X 3, no focal deficits  Central Line: Right CW mediport.    RECENT CULTURES:  09-25 @ 19:05  .Blood  No growth to date.    09-25 @ 16:31  .Urine  No growth    09-25 @ 15:45  .Blood  No growth to date.    09-23 @ 09:55  .Urine  No growth    LABS:                   7.7    4.5   )-----------( 24       ( 30 Sep 2019 07:13 )             23.9     Mean Cell Volume : 91.0 fl  Mean Cell Hemoglobin : 29.5 pg  Mean Cell Hemoglobin Concentration : 32.4 gm/dL  Auto Neutrophil # : 4.1 K/uL  Auto Lymphocyte # : 0.1 K/uL  Auto Monocyte # : 0.1 K/uL  Auto Eosinophil # : 0.1 K/uL  Auto Basophil # : 0.0 K/uL  Auto Neutrophil % : 91.0 %  Auto Lymphocyte % : 3.0 %  Auto Monocyte % : 3.0 %  Auto Eosinophil % : 3.0 %  Auto Basophil % : x      09-30    132<L>  |  101  |  28<H>  ----------------------------<  140<H>  4.0   |  18<L>  |  1.12    Ca    7.9<L>      30 Sep 2019 07:11  Phos  2.8     09-30  Mg     1.8     09-30    TPro  5.2<L>  /  Alb  2.8<L>  /  TBili  1.0  /  DBili  x   /  AST  11  /  ALT  9<L>  /  AlkPhos  99  09-30  Mg 1.8  Phos 2.8  PT/INR - ( 30 Sep 2019 07:13 )   PT: 13.4 sec;   INR: 1.17 ratio    PTT - ( 30 Sep 2019 07:13 )  PTT:34.0 sec    Uric Acid 2.3      RADIOLOGY & ADDITIONAL STUDIES:   CT Abdomen and Pelvis w/ IV Cont (09.29.19 @ 19:39) >  Hepatic lesions which may be infectious or neoplastic. Correlate with   recentbiopsy results.    Splenomegaly with splenic infarcts.    New small right hemoperitoneum may be related to recent liver biopsy. Diagnosis: CMMoL    Protocol/Chemo Regimen: TBD    :  wife interpreting , refusing [place]  service.    Pt endorsed: Feeling better today.    Review of Systems: Patient denies headache, dizziness, visual changes, chest pain, palpitations, SOB, nausea, vomiting, diarrhea, dysuria.    Pain scale: 2/10 abdominal pain                                   Diet: Consistent Carb    Allergies: No Known Allergies    ANTIMICROBIALS  levoFLOXacin  Tablet 250 milliGRAM(s) Oral every 24 hours  tenofovir disoproxil fumarate (VIREAD) 300 milliGRAM(s) Oral <User Schedule>    HEME/ONC MEDICATIONS  hydroxyurea 500 milliGRAM(s) Oral every 12 hours    STANDING MEDICATIONS  allopurinol 300 milliGRAM(s) Oral daily  amLODIPine   Tablet 5 milliGRAM(s) Oral daily  AQUAPHOR (petrolatum Ointment) 1 Application(s) Topical two times a day  benzonatate 100 milliGRAM(s) Oral three times a day  Biotene Dry Mouth Oral Rinse 5 milliLiter(s) Swish and Spit five times a day  bisoprolol   Tablet 5 milliGRAM(s) Oral daily  chlorhexidine 2% Cloths 1 Application(s) Topical <User Schedule>  dextrose 5%. 1000 milliLiter(s) IV Continuous <Continuous>  dextrose 50% Injectable 12.5 Gram(s) IV Push once  dextrose 50% Injectable 25 Gram(s) IV Push once  dextrose 50% Injectable 25 Gram(s) IV Push once  docusate sodium 100 milliGRAM(s) Oral three times a day  finasteride 5 milliGRAM(s) Oral daily  influenza   Vaccine 0.5 milliLiter(s) IntraMuscular once  insulin lispro (HumaLOG) corrective regimen sliding scale   SubCutaneous three times a day before meals  insulin lispro (HumaLOG) corrective regimen sliding scale   SubCutaneous at bedtime  lidocaine 2% Injectable 1 Vial(s) Local Injection once  melatonin 5 milliGRAM(s) Oral at bedtime  nicotine - 21 mG/24Hr(s) Patch 1 patch Transdermal daily  polyethylene glycol 3350 17 Gram(s) Oral daily  pramoxine 1% Topical Lotion 1 Application(s) Topical two times a day  senna 2 Tablet(s) Oral at bedtime  simvastatin 10 milliGRAM(s) Oral at bedtime  sodium chloride 0.9%. 1000 milliLiter(s) IV Continuous <Continuous>  tamsulosin 0.4 milliGRAM(s) Oral at bedtime  triamcinolone 0.1% Ointment 1 Application(s) Topical two times a day    PRN MEDICATIONS  acetaminophen   Tablet .. 650 milliGRAM(s) Oral every 6 hours PRN  dextrose 40% Gel 15 Gram(s) Oral once PRN  glucagon  Injectable 1 milliGRAM(s) IntraMuscular once PRN  morphine  - Injectable 2 milliGRAM(s) IV Push every 4 hours PRN  simethicone 80 milliGRAM(s) Chew every 6 hours PRN  zolpidem 5 milliGRAM(s) Oral at bedtime PRN    Vital Signs Last 24 Hrs  T(C): 36.6 (30 Sep 2019 05:50), Max: 36.7 (29 Sep 2019 10:50)  T(F): 97.8 (30 Sep 2019 05:50), Max: 98.1 (29 Sep 2019 10:50)  HR: 85 (30 Sep 2019 05:50) (72 - 95)  BP: 134/62 (30 Sep 2019 05:50) (102/47 - 134/62)  BP(mean): --  RR: 16 (30 Sep 2019 05:50) (16 - 18)  SpO2: 98% (30 Sep 2019 05:50) (97% - 100%)    PHYSICAL EXAM  General: NAD  HEENT: PERRLA, EOMOI, clear oropharynx, anicteric sclera, pink conjunctiva  Neck: supple  CV: (+) S1/S2 RRR  Lungs: clear to auscultation, no wheezes or rales  Abdomen: soft, non-tender, non-distended (+) BS  Ext: no clubbing, cyanosis or edema  Skin: no rashes and no petechiae  Neuro: alert and oriented X 3, no focal deficits  Central Line: Right CW Mediport    RECENT CULTURES  09-25 @ 19:05  .Blood  No growth to date.    09-25 @ 16:31  .Urine  No growth    09-25 @ 15:45  .Blood  No growth to date.    09-23 @ 09:55  .Urine  No growth    LABS:                   7.7    4.5   )-----------( 24       ( 30 Sep 2019 07:13 )             23.9     Mean Cell Volume : 91.0 fl  Mean Cell Hemoglobin : 29.5 pg  Mean Cell Hemoglobin Concentration : 32.4 gm/dL  Auto Neutrophil # : 4.1 K/uL  Auto Lymphocyte # : 0.1 K/uL  Auto Monocyte # : 0.1 K/uL  Auto Eosinophil # : 0.1 K/uL  Auto Basophil # : 0.0 K/uL  Auto Neutrophil % : 91.0 %  Auto Lymphocyte % : 3.0 %  Auto Monocyte % : 3.0 %  Auto Eosinophil % : 3.0 %  Auto Basophil % : x      09-30    132<L>  |  101  |  28<H>  ----------------------------<  140<H>  4.0   |  18<L>  |  1.12    Ca    7.9<L>      30 Sep 2019 07:11  Phos  2.8     09-30  Mg     1.8     09-30    TPro  5.2<L>  /  Alb  2.8<L>  /  TBili  1.0  /  DBili  x   /  AST  11  /  ALT  9<L>  /  AlkPhos  99  09-30  Mg 1.8  Phos 2.8  PT/INR - ( 30 Sep 2019 07:13 )   PT: 13.4 sec;   INR: 1.17 ratio    PTT - ( 30 Sep 2019 07:13 )  PTT:34.0 sec    Uric Acid 2.3      RADIOLOGY & ADDITIONAL STUDIES:   CT Abdomen and Pelvis w/ IV Cont (09.29.19 @ 19:39) >  Hepatic lesions which may be infectious or neoplastic. Correlate with   recent biopsy results.    Splenomegaly with splenic infarcts.    New small right hemoperitoneum may be related to recent liver biopsy.

## 2019-09-30 NOTE — PROGRESS NOTE ADULT - PROBLEM SELECTOR PLAN 5
Likely due to constipation   Continue stool softeners  Follow up CT Abdomen/pelvis results Likely due to constipation   Continue stool softeners   CT Abdomen/pelvis result shows hepatic lesion, small volume retroperitoneal bleed, will repeat CT A/P on 10/1 to f/u.

## 2019-09-30 NOTE — PROGRESS NOTE ADULT - PROBLEM SELECTOR PLAN 10
No pharmacologic ppx 2/2 port placement and worsening thrombocytopenia  Encourage ambulation      Contact info [phone]

## 2019-09-30 NOTE — PROGRESS NOTE ADULT - PROBLEM SELECTOR PLAN 9
Chronic rash to upper back and feet improving  No primary lesions appreciated on exam, all appear secondary to scratching  Can be 2/2 underlying malignancy, CKD or obstructive process if the liver  Continue Vaseline and Triamcinolone ointment and Sarna cream   Dermatology following Chronic rash to upper back and feet resolved.  No primary lesions appreciated on exam, all appear secondary to scratching  Can be 2/2 underlying malignancy, CKD or obstructive process if the liver  Continue Vaseline and Triamcinolone ointment and Sarna cream   Dermatology following

## 2019-09-30 NOTE — PROGRESS NOTE ADULT - ATTENDING COMMENTS
83 yo M Mandarin-speaking, p/w leukocytosis also with lung masses ? 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 Cont Hydrea, decrease dose to 500mg twice daily. s/p liver bx 9/27 -pt has a second malignancy, likely metastatic lung CA with liver mets given long hx smoking  -monitor for tumor lysis syndrome -on Allopurinol  -hx CAD and stents -ASA 81mg on hold given plt>50. Echo -EF 65%  -hep B -core Ab +,  PCR pending, cont Viread. Hep C, and HIV nonreactive. Quantiferron gold indeterminate  -c/o abdominal pain, H/H no response after 1u PRBCs yesterday -check CT A/P r/o bleed  -dispo planning -d/c home with outpt f/u when stable 83 yo M Mandarin-speaking, p/w leukocytosis also with lung masses ? 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 Off Hydrea since 9/30  -s/p liver bx 9/27 -pt has a second malignancy, likely metastatic lung CA with liver mets given long hx smoking. Retroperitoneal bleed on 9/29 CT abdomen done for drop in Hb. Monitor CBC 2x/day, repeat CT A/P I- tomorrow to see if bleed is stable  -monitor for tumor lysis syndrome -on Allopurinol  -hx CAD and stents -ASA 81mg on hold given plt>50. Echo -EF 65%  -hep B -core Ab +,  PCR pending, cont Viread. Hep C, and HIV nonreactive. Quantiferron gold indeterminate  -dispo planning -d/c home with outpt f/u when stable

## 2019-09-30 NOTE — PROGRESS NOTE ADULT - SUBJECTIVE AND OBJECTIVE BOX
Interventional Radiology Follow- Up Note      This is a 84y Male s/p right chest wall port and liver mass bx on 9/27/19 in Interventional Radiology with Dr. Mccollum.       PAST MEDICAL & SURGICAL HISTORY:  Lung cancer  High cholesterol  HTN (hypertension)  Diabetes mellitus  No significant past surgical history      Allergies: No Known Allergies    LABS:                        7.7    4.5   )-----------( 24       ( 30 Sep 2019 07:13 )             23.9     09-30    132<L>  |  101  |  28<H>  ----------------------------<  140<H>  4.0   |  18<L>  |  1.12    Ca    7.9<L>      30 Sep 2019 07:11  Phos  2.8     09-30  Mg     1.8     09-30    TPro  5.2<L>  /  Alb  2.8<L>  /  TBili  1.0  /  DBili  x   /  AST  11  /  ALT  9<L>  /  AlkPhos  99  09-30    PT/INR - ( 30 Sep 2019 07:13 )   PT: 13.4 sec;   INR: 1.17 ratio         PTT - ( 30 Sep 2019 07:13 )  PTT:34.0 sec    Liver mass bx results: pending    Vitals: T(F): 96.8 (09-30-19 @ 10:50), Max: 98.1 (09-29-19 @ 21:28)  HR: 68 (09-30-19 @ 10:50) (66 - 85)  BP: 108/41 (09-30-19 @ 10:50) (99/39 - 134/62)  RR: 16 (09-30-19 @ 10:50) (16 - 18)  SpO2: 100% (09-30-19 @ 10:50) (97% - 100%)    PHYSICAL EXAM:  Gen:  Abd:    < from: CT Abdomen and Pelvis w/ IV Cont (09.29.19 @ 19:39) >  IMPRESSION:     Hepatic lesions which may be infectious or neoplastic. Correlate with   recentbiopsy results.    Splenomegaly with splenic infarcts.    New small right hemoperitoneum may be related to recent liver biopsy.    < end of copied text >      A/P: 84y Male with PMH of DMII, HTN, PVD, CAD s/p recent stent x 3 (9/19), AAA with stent, current smoker with newly diagnosis of Lung CA with mets to the liver who was admitted for management of acute leukemia. Patient is currently s/p right chest wall port placement and liver mass bx on 9/27 in IR with Dr. Mccollum.   -Follow up bx results  -Anemia 2/2 disease process as per hem/onc note, transfuse PRN  -trend vitals, labs  -Continue global care per primary team  -Case and CT from 9/29 reviewed with Dr. Mccollum     Please call IR at extension [phone] with any questions, concerns, or issues regarding above. Interventional Radiology Follow- Up Note      This is a 84y Male s/p right chest wall port and liver mass bx on 9/27/19 in Interventional Radiology with Dr. Mccollum.     Patient seen and examined at bedside. Patient daughter at bedside and translated as per pt request. Patient reporting having abdominal pain over the weekend which improved after having bowel movement this morning. CT obtained on 9/29 showing small hemoperitoneum likely related to liver bx. Patient transfused total of 2U of PRBC post procedure for low H/H and he is currently receiving 1U of platelets.     PAST MEDICAL & SURGICAL HISTORY:  Lung cancer  High cholesterol  HTN (hypertension)  Diabetes mellitus  No significant past surgical history    Allergies: No Known Allergies    LABS:                        7.7    4.5   )-----------( 24       ( 30 Sep 2019 07:13 )             23.9     09-30    132<L>  |  101  |  28<H>  ----------------------------<  140<H>  4.0   |  18<L>  |  1.12    Ca    7.9<L>      30 Sep 2019 07:11  Phos  2.8     09-30  Mg     1.8     09-30    TPro  5.2<L>  /  Alb  2.8<L>  /  TBili  1.0  /  DBili  x   /  AST  11  /  ALT  9<L>  /  AlkPhos  99  09-30    PT/INR - ( 30 Sep 2019 07:13 )   PT: 13.4 sec;   INR: 1.17 ratio         PTT - ( 30 Sep 2019 07:13 )  PTT:34.0 sec    Liver mass bx results: pending    Vitals: T(F): 96.8 (09-30-19 @ 10:50), Max: 98.1 (09-29-19 @ 21:28)  HR: 68 (09-30-19 @ 10:50) (66 - 85)  BP: 108/41 (09-30-19 @ 10:50) (99/39 - 134/62)  RR: 16 (09-30-19 @ 10:50) (16 - 18)  SpO2: 100% (09-30-19 @ 10:50) (97% - 100%)    PHYSICAL EXAM:  Gen: NAD, A&Ox3  Abd: ND, soft, minimally tender over bx site. Dressing over bx site c/d/i. Bx site soft.     < from: CT Abdomen and Pelvis w/ IV Cont (09.29.19 @ 19:39) >  IMPRESSION:     Hepatic lesions which may be infectious or neoplastic. Correlate with   recentbiopsy results.    Splenomegaly with splenic infarcts.    New small right hemoperitoneum may be related to recent liver biopsy.    < end of copied text >      A/P: 84y Male with PMH of DMII, HTN, PVD, CAD s/p recent stent x 3 (9/19), AAA with stent, current smoker with newly diagnosis of Lung CA with mets to the liver who was admitted for management of acute leukemia. Patient is currently s/p right chest wall port placement and liver mass bx on 9/27 in IR with Dr. Mccollum.   -Follow up bx results  -Anemia 2/2 disease process as per hem/onc note, transfuse PRN  -trend vitals, labs  -Continue global care per primary team  -Case and CT from 9/29 reviewed with Dr. Mccollum     Please call IR at extension [phone] with any questions, concerns, or issues regarding above.

## 2019-09-30 NOTE — PROGRESS NOTE ADULT - ASSESSMENT
This is an 83 yo M with PMHx of T2DM, HTN, PVD, CAD s/p recent stent x 3 (9/19), AAA with stent, current smoker with newly diagnosis of Lung CA with mets to the liver (Lung bx completed in [place], currently not receiving tx) admitted for management of acute leukemia. Patient is status post BM bx completed 9/23, revealing CMMoL. P{t has anemia and thrombocytopenia secondary to disease.

## 2019-09-30 NOTE — PROGRESS NOTE ADULT - PROBLEM SELECTOR PLAN 1
9/23 Status post BM bx  Hydrea dose reduced to 500mg Q12hrs  Monitor CBC/Lytes and transfuse/replete PRN  Anemia: HGB 6.3, transfuse  unit of PRBCs today.  Monitor TLS labs q 12  Strict Is and Os/daily weights/Mouth Care  Antiemetics   IVF  Continue Allopurinol 300 mg daily  Discharge planning when stable 9/23 Status post BM bx  D/Romero Hydrea today, WBC- 4.5  Monitor CBC/Lytes and transfuse/replete PRN  Monitor TLS labs q 12  Strict Is and Os/daily weights/Mouth Care  Antiemetics   IVF  Continue Allopurinol 300 mg daily  Discharge planning when stable

## 2019-09-30 NOTE — PROGRESS NOTE ADULT - PROBLEM SELECTOR PLAN 2
The patient is not neutropenic, afebrile  If febrile Pan Cx   9/28 CXR c/w left upper lobe mass.   Continue Levaquin, if decompensates start Cefepime  Hep B Core (+) continue Viread, PCR (-)  QuantiFeron Gold indeterminate on blood work, spoke to dr. Parham from ID who states no acute interventions at this time, to be followed up prior to chemo treatment The patient is not neutropenic, afebrile  If febrile Pan Cx   Continue Levaquin, if decompensates start Cefepime  Hep B Core (+) continue Viread, PCR (-)  QuantiFeron Gold indeterminate on blood work, spoke to dr. Parham from ID who states no acute interventions at this time, to be followed up prior to chemo treatment

## 2019-09-30 NOTE — PROGRESS NOTE ADULT - PROBLEM SELECTOR PLAN 3
with metastasis to the liver diagnosed by needle bx in [facility]  CT C/A/P shows 4.3 x 3.4 cm mass posterior left upper lobe. 1.3 cm anterior right   upper lobe nodule. These are likely neoplastic. Multiple hypodense lesions within the liver likely metastatic  Will likely need to manage acute leukemia before treating Lung CA,  s/p Liver biopsy on 9/27. follow up results

## 2019-10-01 LAB
ALBUMIN SERPL ELPH-MCNC: 2.9 G/DL — LOW (ref 3.3–5)
ALP SERPL-CCNC: 103 U/L — SIGNIFICANT CHANGE UP (ref 40–120)
ALT FLD-CCNC: 12 U/L — SIGNIFICANT CHANGE UP (ref 10–45)
ANION GAP SERPL CALC-SCNC: 8 MMOL/L — SIGNIFICANT CHANGE UP (ref 5–17)
AST SERPL-CCNC: 16 U/L — SIGNIFICANT CHANGE UP (ref 10–40)
BASOPHILS # BLD AUTO: 0 K/UL — SIGNIFICANT CHANGE UP (ref 0–0.2)
BILIRUB SERPL-MCNC: 1 MG/DL — SIGNIFICANT CHANGE UP (ref 0.2–1.2)
BUN SERPL-MCNC: 26 MG/DL — HIGH (ref 7–23)
CALCIUM SERPL-MCNC: 7.6 MG/DL — LOW (ref 8.4–10.5)
CHLORIDE SERPL-SCNC: 104 MMOL/L — SIGNIFICANT CHANGE UP (ref 96–108)
CO2 SERPL-SCNC: 19 MMOL/L — LOW (ref 22–31)
CREAT SERPL-MCNC: 1.04 MG/DL — SIGNIFICANT CHANGE UP (ref 0.5–1.3)
EOSINOPHIL # BLD AUTO: 0.1 K/UL — SIGNIFICANT CHANGE UP (ref 0–0.5)
EOSINOPHIL NFR BLD AUTO: 4 % — SIGNIFICANT CHANGE UP (ref 0–6)
GLUCOSE BLDC GLUCOMTR-MCNC: 205 MG/DL — HIGH (ref 70–99)
GLUCOSE BLDC GLUCOMTR-MCNC: 215 MG/DL — HIGH (ref 70–99)
GLUCOSE BLDC GLUCOMTR-MCNC: 248 MG/DL — HIGH (ref 70–99)
GLUCOSE BLDC GLUCOMTR-MCNC: 256 MG/DL — HIGH (ref 70–99)
GLUCOSE SERPL-MCNC: 173 MG/DL — HIGH (ref 70–99)
HCT VFR BLD CALC: 23.7 % — LOW (ref 39–50)
HGB BLD-MCNC: 7.6 G/DL — LOW (ref 13–17)
LDH SERPL L TO P-CCNC: 734 U/L — HIGH (ref 50–242)
LYMPHOCYTES # BLD AUTO: 0.5 K/UL — LOW (ref 1–3.3)
LYMPHOCYTES # BLD AUTO: 9 % — LOW (ref 13–44)
MAGNESIUM SERPL-MCNC: 1.8 MG/DL — SIGNIFICANT CHANGE UP (ref 1.6–2.6)
MCHC RBC-ENTMCNC: 29.6 PG — SIGNIFICANT CHANGE UP (ref 27–34)
MCHC RBC-ENTMCNC: 32 GM/DL — SIGNIFICANT CHANGE UP (ref 32–36)
MCV RBC AUTO: 92.4 FL — SIGNIFICANT CHANGE UP (ref 80–100)
MONOCYTES # BLD AUTO: 0.2 K/UL — SIGNIFICANT CHANGE UP (ref 0–0.9)
MONOCYTES NFR BLD AUTO: 5 % — SIGNIFICANT CHANGE UP (ref 2–14)
NEUTROPHILS # BLD AUTO: 3.7 K/UL — SIGNIFICANT CHANGE UP (ref 1.8–7.4)
NEUTROPHILS NFR BLD AUTO: 81 % — HIGH (ref 43–77)
NEUTS BAND # BLD: 1 % — SIGNIFICANT CHANGE UP (ref 0–8)
NRBC # BLD: 18 /100 — HIGH (ref 0–0)
PHOSPHATE SERPL-MCNC: 2.7 MG/DL — SIGNIFICANT CHANGE UP (ref 2.5–4.5)
PLAT MORPH BLD: NORMAL — SIGNIFICANT CHANGE UP
PLATELET # BLD AUTO: 37 K/UL — LOW (ref 150–400)
POTASSIUM SERPL-MCNC: 3.8 MMOL/L — SIGNIFICANT CHANGE UP (ref 3.5–5.3)
POTASSIUM SERPL-SCNC: 3.8 MMOL/L — SIGNIFICANT CHANGE UP (ref 3.5–5.3)
PROT SERPL-MCNC: 5.1 G/DL — LOW (ref 6–8.3)
RBC # BLD: 2.56 M/UL — LOW (ref 4.2–5.8)
RBC # FLD: 17.1 % — HIGH (ref 10.3–14.5)
RBC BLD AUTO: SIGNIFICANT CHANGE UP
SODIUM SERPL-SCNC: 131 MMOL/L — LOW (ref 135–145)
URATE SERPL-MCNC: 2 MG/DL — LOW (ref 3.4–8.8)
WBC # BLD: 4.5 K/UL — SIGNIFICANT CHANGE UP (ref 3.8–10.5)
WBC # FLD AUTO: 4.5 K/UL — SIGNIFICANT CHANGE UP (ref 3.8–10.5)

## 2019-10-01 PROCEDURE — 74176 CT ABD & PELVIS W/O CONTRAST: CPT | Mod: 26

## 2019-10-01 PROCEDURE — 99232 SBSQ HOSP IP/OBS MODERATE 35: CPT | Mod: GC

## 2019-10-01 RX ORDER — ZOLPIDEM TARTRATE 10 MG/1
5 TABLET ORAL AT BEDTIME
Refills: 0 | Status: DISCONTINUED | OUTPATIENT
Start: 2019-10-01 | End: 2019-10-02

## 2019-10-01 RX ADMIN — AMLODIPINE BESYLATE 5 MILLIGRAM(S): 2.5 TABLET ORAL at 06:33

## 2019-10-01 RX ADMIN — Medication 300 MILLIGRAM(S): at 13:15

## 2019-10-01 RX ADMIN — Medication 3: at 17:22

## 2019-10-01 RX ADMIN — FINASTERIDE 5 MILLIGRAM(S): 5 TABLET, FILM COATED ORAL at 13:16

## 2019-10-01 RX ADMIN — Medication 100 MILLIGRAM(S): at 06:33

## 2019-10-01 RX ADMIN — Medication 5 MILLILITER(S): at 21:05

## 2019-10-01 RX ADMIN — Medication 5 MILLILITER(S): at 13:17

## 2019-10-01 RX ADMIN — Medication 2: at 13:23

## 2019-10-01 RX ADMIN — Medication 100 MILLIGRAM(S): at 21:04

## 2019-10-01 RX ADMIN — BISOPROLOL FUMARATE 5 MILLIGRAM(S): 10 TABLET, FILM COATED ORAL at 06:33

## 2019-10-01 RX ADMIN — PRAMOXINE HYDROCHLORIDE 1 APPLICATION(S): 150 AEROSOL, FOAM RECTAL at 17:10

## 2019-10-01 RX ADMIN — POLYETHYLENE GLYCOL 3350 17 GRAM(S): 17 POWDER, FOR SOLUTION ORAL at 13:14

## 2019-10-01 RX ADMIN — SENNA PLUS 2 TABLET(S): 8.6 TABLET ORAL at 21:09

## 2019-10-01 RX ADMIN — Medication 5 MILLILITER(S): at 17:09

## 2019-10-01 RX ADMIN — Medication 100 MILLIGRAM(S): at 13:16

## 2019-10-01 RX ADMIN — Medication 10 MILLIGRAM(S): at 13:15

## 2019-10-01 RX ADMIN — TAMSULOSIN HYDROCHLORIDE 0.4 MILLIGRAM(S): 0.4 CAPSULE ORAL at 21:10

## 2019-10-01 RX ADMIN — Medication 5 MILLILITER(S): at 19:55

## 2019-10-01 RX ADMIN — SIMVASTATIN 10 MILLIGRAM(S): 20 TABLET, FILM COATED ORAL at 21:05

## 2019-10-01 RX ADMIN — Medication 5 MILLILITER(S): at 08:30

## 2019-10-01 RX ADMIN — ZOLPIDEM TARTRATE 5 MILLIGRAM(S): 10 TABLET ORAL at 21:10

## 2019-10-01 RX ADMIN — Medication 2: at 08:30

## 2019-10-01 RX ADMIN — Medication 1 APPLICATION(S): at 17:10

## 2019-10-01 RX ADMIN — Medication 1 APPLICATION(S): at 17:09

## 2019-10-01 RX ADMIN — Medication 1 APPLICATION(S): at 06:34

## 2019-10-01 NOTE — PROGRESS NOTE ADULT - SUBJECTIVE AND OBJECTIVE BOX
Diagnosis: CMMoL    Protocol/Chemo Regimen: TBD    :  wife interpreting , refusing [place]  service.    Pt endorsed: No acute complaints     Review of Systems: Patient denies headache, dizziness, visual changes, chest pain, palpitations, SOB, nausea, vomiting, diarrhea, dysuria.    Pain scale: 2/10 abdominal pain                                   Diet: Consistent Carb    Allergies: No Known Allergies        ANTIMICROBIALS  levoFLOXacin  Tablet 250 milliGRAM(s) Oral every 24 hours  tenofovir disoproxil fumarate (VIREAD) 300 milliGRAM(s) Oral <User Schedule>      HEME/ONC MEDICATIONS      STANDING MEDICATIONS  allopurinol 300 milliGRAM(s) Oral daily  amLODIPine   Tablet 5 milliGRAM(s) Oral daily  AQUAPHOR (petrolatum Ointment) 1 Application(s) Topical two times a day  benzonatate 100 milliGRAM(s) Oral three times a day  Biotene Dry Mouth Oral Rinse 5 milliLiter(s) Swish and Spit five times a day  bisoprolol   Tablet 5 milliGRAM(s) Oral daily  chlorhexidine 2% Cloths 1 Application(s) Topical <User Schedule>  dextrose 5%. 1000 milliLiter(s) IV Continuous <Continuous>  dextrose 50% Injectable 12.5 Gram(s) IV Push once  dextrose 50% Injectable 25 Gram(s) IV Push once  dextrose 50% Injectable 25 Gram(s) IV Push once  docusate sodium 100 milliGRAM(s) Oral three times a day  finasteride 5 milliGRAM(s) Oral daily  influenza   Vaccine 0.5 milliLiter(s) IntraMuscular once  insulin lispro (HumaLOG) corrective regimen sliding scale   SubCutaneous three times a day before meals  insulin lispro (HumaLOG) corrective regimen sliding scale   SubCutaneous at bedtime  lidocaine 2% Injectable 1 Vial(s) Local Injection once  melatonin 5 milliGRAM(s) Oral at bedtime  nicotine - 21 mG/24Hr(s) Patch 1 patch Transdermal daily  phytonadione   Solution 10 milliGRAM(s) Oral daily  polyethylene glycol 3350 17 Gram(s) Oral daily  pramoxine 1% Topical Lotion 1 Application(s) Topical two times a day  senna 2 Tablet(s) Oral at bedtime  simvastatin 10 milliGRAM(s) Oral at bedtime  sodium chloride 0.9%. 1000 milliLiter(s) IV Continuous <Continuous>  tamsulosin 0.4 milliGRAM(s) Oral at bedtime  triamcinolone 0.1% Ointment 1 Application(s) Topical two times a day      PRN MEDICATIONS  acetaminophen   Tablet .. 650 milliGRAM(s) Oral every 6 hours PRN  dextrose 40% Gel 15 Gram(s) Oral once PRN  glucagon  Injectable 1 milliGRAM(s) IntraMuscular once PRN  simethicone 80 milliGRAM(s) Chew every 6 hours PRN        Vital Signs Last 24 Hrs  T(C): 37 (01 Oct 2019 13:00), Max: 37.1 (01 Oct 2019 09:20)  T(F): 98.6 (01 Oct 2019 13:00), Max: 98.8 (01 Oct 2019 09:20)  HR: 69 (01 Oct 2019 13:00) (68 - 96)  BP: 114/57 (01 Oct 2019 13:00) (105/52 - 135/51)  BP(mean): --  RR: 16 (01 Oct 2019 13:00) (16 - 19)  SpO2: 100% (01 Oct 2019 13:00) (99% - 100%)      PHYSICAL EXAM  General: NAD  HEENT: PERRLA, EOMOI, clear oropharynx, anicteric sclera, pink conjunctiva  Neck: supple  CV: (+) S1/S2 RRR  Lungs: clear to auscultation, no wheezes or rales  Abdomen: soft, non-tender, non-distended (+) BS  Ext: no clubbing, cyanosis or edema  Skin: no rashes and no petechiae  Neuro: alert and oriented X 3, no focal deficits  Central Line: Right CW Mediport      RECENT CULTURES:  09-25 @ 19:05  .Blood  --  --  --    No growth at 5 days.  --  09-25 @ 16:31  .Urine  --  --  --    No growth  --  09-25 @ 15:45  .Blood  --  --  --    No growth at 5 days.  --        LABS:                        7.6    4.5   )-----------( 37       ( 01 Oct 2019 07:40 )             23.7         Mean Cell Volume : 92.4 fl  Mean Cell Hemoglobin : 29.6 pg  Mean Cell Hemoglobin Concentration : 32.0 gm/dL  Auto Neutrophil # : 3.7 K/uL  Auto Lymphocyte # : 0.5 K/uL  Auto Monocyte # : 0.2 K/uL  Auto Eosinophil # : 0.1 K/uL  Auto Basophil # : 0.0 K/uL  Auto Neutrophil % : 81.0 %  Auto Lymphocyte % : 9.0 %  Auto Monocyte % : 5.0 %  Auto Eosinophil % : 4.0 %  Auto Basophil % : x      10-01    131<L>  |  104  |  26<H>  ----------------------------<  173<H>  3.8   |  19<L>  |  1.04    Ca    7.6<L>      01 Oct 2019 07:40  Phos  2.7     10-01  Mg     1.8     10-01    TPro  5.1<L>  /  Alb  2.9<L>  /  TBili  1.0  /  DBili  x   /  AST  16  /  ALT  12  /  AlkPhos  103  10-01      Mg 1.8  Phos 2.7      PT/INR - ( 30 Sep 2019 07:13 )   PT: 13.4 sec;   INR: 1.17 ratio         PTT - ( 30 Sep 2019 07:13 )  PTT:34.0 sec      Uric Acid 2.0        RADIOLOGY & ADDITIONAL STUDIES:  CT Abdomen and Pelvis No Cont (10.01.19 @ 09:32)   Stable small volume hemoperitoneum.

## 2019-10-01 NOTE — PROGRESS NOTE ADULT - PROBLEM SELECTOR PLAN 5
Likely due to constipation   Continue stool softeners   CT Abdomen/pelvis result shows hepatic lesion, small volume retroperitoneal bleed, will repeat CT A/P on 10/1 to f/u.

## 2019-10-01 NOTE — PROGRESS NOTE ADULT - ATTENDING COMMENTS
85 yo M Mandarin-speaking, p/w leukocytosis also with lung masses ? 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 Off Hydrea since 9/30  -s/p liver bx 9/27 -pt has a second malignancy, likely metastatic lung CA with liver mets given long hx smoking. Retroperitoneal bleed on 9/29 CT abdomen done for drop in Hb. Monitor CBC 2x/day, repeat CT A/P I- tomorrow to see if bleed is stable  -monitor for tumor lysis syndrome -on Allopurinol  -hx CAD and stents -ASA 81mg on hold given plt>50. Echo -EF 65%  -hep B -core Ab +,  PCR pending, cont Viread. Hep C, and HIV nonreactive. Quantiferron gold indeterminate  -dispo planning -d/c home with outpt f/u when stable 85 yo M Mandarin-speaking, p/w leukocytosis also with lung masses ? 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 Off Hydrea since 9/30  -s/p liver bx 9/27 -pt has a second malignancy, likely metastatic lung CA with liver mets given long hx smoking. Retroperitoneal bleed on 9/29 CT abdomen done for drop in Hb. Monitor CBC 2x/day, repeat CT A/P I- today to assess if bleed is stable. Keep plt>50k/ul, getting PRBC today   -monitor for tumor lysis syndrome -on Allopurinol  -hx CAD and stents -ASA 81mg on hold given plt>50. Echo -EF 65%  -hep B -core Ab +,  PCR pending, cont Viread. Hep C, and HIV nonreactive. Quantiferron gold indeterminate  -dispo planning -d/c home with outpt f/u when stable

## 2019-10-01 NOTE — PROVIDER CONTACT NOTE (CRITICAL VALUE NOTIFICATION) - ASSESSMENT
Pt alert and oriented. No s/s of bleeding present.
pt mandarin speaking and alert
Hemoglobin is 7.0 Pt is asymptomatic, no s/s of distress noted. Pt eatting breakfast in chair,
No acute distress noted. No s/s of bleeding present
Patient is alert and oriented x4, No SOB, chest pain, N/V. VSS
Pt not in acute distress at this time. Afebrile, VSS.
VSS, no s/s distress. No s/s bleeding.
pt resting in bed comfortably, no s/s of distress noted. Tolerating IV fluids positively.
pt stable
Pt A&Ox2, VSS at this time. Pt receiving Zosyn IV ABX. No fever/s/s of active infection at this time. Pt educated on s/s of infection and to alert RN if s/s occur.

## 2019-10-01 NOTE — CHART NOTE - NSCHARTNOTEFT_GEN_A_CORE
Pt seen for malnutrition follow up. PT with CMMoL S/P hydrea, suspected secondary malignancy per team likely lung cancer with mets to liver S/P liver biopsy 9/27.    Source: Patient [x ]    Family [ ]     other [ x] Pt is primarily Mandarin speaking, utilized [employer] Mandarin  however RD misplaced ID number, attempted to call back to obtain ID number of  used however customer service was unable to provide,  JUAN M [id number] provided connection to customer service.     Diet : Consistent carbohydrate diet with evening snack, no concentrated phosphorus with glucerna 2 daily        Patient denies N+V however reports constipation, stated his last BM was 3 days ago-noted last BM yesterday-pt ordered for senna, miralax, and colace      PO intake:  Pt reports po intake has been variable, stated ~50% of the time he is able to complete his meal and other times he is not, pt stated he will usually consume 2-3 meals per day, RD observed 100% mexicali rice bowl, cheesecake and berry bowl consumed today for lunch. Pt stated he does not consistently take glucerna shakes as he was fearful they were not good for him as they taste sweet. RD assured pt he is able to drink these with type 2 DM.       Current Weight: 136.6 lbs (9/25), 136.2 lbs (10/1), weight stable  % Weight Change    Pertinent Medications: MEDICATIONS  (STANDING):  allopurinol 300 milliGRAM(s) Oral daily  amLODIPine   Tablet 5 milliGRAM(s) Oral daily  AQUAPHOR (petrolatum Ointment) 1 Application(s) Topical two times a day  benzonatate 100 milliGRAM(s) Oral three times a day  Biotene Dry Mouth Oral Rinse 5 milliLiter(s) Swish and Spit five times a day  bisoprolol   Tablet 5 milliGRAM(s) Oral daily  chlorhexidine 2% Cloths 1 Application(s) Topical <User Schedule>  dextrose 5%. 1000 milliLiter(s) (50 mL/Hr) IV Continuous <Continuous>  dextrose 50% Injectable 12.5 Gram(s) IV Push once  dextrose 50% Injectable 25 Gram(s) IV Push once  dextrose 50% Injectable 25 Gram(s) IV Push once  docusate sodium 100 milliGRAM(s) Oral three times a day  finasteride 5 milliGRAM(s) Oral daily  influenza   Vaccine 0.5 milliLiter(s) IntraMuscular once  insulin lispro (HumaLOG) corrective regimen sliding scale   SubCutaneous three times a day before meals  insulin lispro (HumaLOG) corrective regimen sliding scale   SubCutaneous at bedtime  levoFLOXacin  Tablet 250 milliGRAM(s) Oral every 24 hours  lidocaine 2% Injectable 1 Vial(s) Local Injection once  melatonin 5 milliGRAM(s) Oral at bedtime  nicotine - 21 mG/24Hr(s) Patch 1 patch Transdermal daily  phytonadione   Solution 10 milliGRAM(s) Oral daily  polyethylene glycol 3350 17 Gram(s) Oral daily  pramoxine 1% Topical Lotion 1 Application(s) Topical two times a day  senna 2 Tablet(s) Oral at bedtime  simvastatin 10 milliGRAM(s) Oral at bedtime  sodium chloride 0.9%. 1000 milliLiter(s) (75 mL/Hr) IV Continuous <Continuous>  tamsulosin 0.4 milliGRAM(s) Oral at bedtime  tenofovir disoproxil fumarate (VIREAD) 300 milliGRAM(s) Oral <User Schedule>  triamcinolone 0.1% Ointment 1 Application(s) Topical two times a day    MEDICATIONS  (PRN):  acetaminophen   Tablet .. 650 milliGRAM(s) Oral every 6 hours PRN Temp greater or equal to 38C (100.4F), Moderate Pain (4 - 6), Severe Pain (7 - 10)  dextrose 40% Gel 15 Gram(s) Oral once PRN Blood Glucose LESS THAN 70 milliGRAM(s)/deciliter  glucagon  Injectable 1 milliGRAM(s) IntraMuscular once PRN Glucose LESS THAN 70 milligrams/deciliter  simethicone 80 milliGRAM(s) Chew every 6 hours PRN Gas    Pertinent Labs:  10-01 Na131 mmol/L<L> Glu 173 mg/dL<H> K+ 3.8 mmol/L Cr  1.04 mg/dL BUN 26 mg/dL<H> 10-01 Phos 2.7 mg/dL 10-01 Alb 2.9 g/dL<L> 09-23 [id number] 5.7 %<H>    CAPILLARY BLOOD GLUCOSE      POCT Blood Glucose.: 248 mg/dL (01 Oct 2019 13:20)  POCT Blood Glucose.: 205 mg/dL (01 Oct 2019 08:08)  POCT Blood Glucose.: 204 mg/dL (30 Sep 2019 21:08)  POCT Blood Glucose.: 201 mg/dL (30 Sep 2019 18:10)        Skin: No edema, skin intact    Estimated Needs:   [x ] no change since previous assessment  [ ] recalculated:       Previous Nutrition Diagnosis:      [ x] Malnutrition (severe)         Nutrition Diagnosis is [ x] ongoing, addressed with supplement and po intake, care plan in progress          New Nutrition Diagnosis: [ x] not applicable       Interventions:     Recommend    1. Consider liberalizing diet to consistent carbohydrate with evening snack and glucerna 2 daily, noted phosphorus levels have been within desirable limits  2. Continue to encourage po intake and obtain/honor food preferences as able, RD encouraged pt to take glucerna shakes between meals, emphasis on food with protein        Monitoring and Evaluation:     [x ] PO intake [x ] Tolerance to diet prescription [x ] weights [x ] follow up per protocol    [ ] other:

## 2019-10-01 NOTE — PROVIDER CONTACT NOTE (CRITICAL VALUE NOTIFICATION) - NAME OF MD/NP/PA/DO NOTIFIED:
Nadia Forbes NP
Ariella GUERRERO
Kathleen Cabral
[facility]
MD Guillen
NATALI Chen
NATALI Knox
Sandi Patel NP
aSndi Patel
team 2

## 2019-10-01 NOTE — PROGRESS NOTE ADULT - PROBLEM SELECTOR PLAN 2
The patient is not neutropenic, afebrile  If febrile Pan Cx   Continue Levaquin, if decompensates start Cefepime  Hep B Core (+) continue Viread, PCR (-)  QuantiFeron Gold indeterminate on blood work, spoke to dr. Parham from ID who states no acute interventions at this time, to be followed up prior to chemo treatment

## 2019-10-01 NOTE — PROVIDER CONTACT NOTE (CRITICAL VALUE NOTIFICATION) - BACKGROUND
AML
pt admitted 9/23 with sob and fevers
AML
AML
Dx: PNA  Hx: lung ca, high chol, HTN, DM2
New AML
Pt admitted with PNA and SOB, Has HX of lung cancer with METS.
Pt admitted with pna ,sob, fever, hx of lung ca with mets to liver.
Pt with new leukemia
Pt admitted for PNA/Sepsis

## 2019-10-01 NOTE — PROGRESS NOTE ADULT - PROBLEM SELECTOR PLAN 1
9/23 Status post BM bx  D/Romero Hydrea today, WBC- 4.5  Monitor CBC/Lytes and transfuse/replete PRN  Monitor TLS labs q 12  Strict Is and Os/daily weights/Mouth Care  Antiemetics   IVF  Continue Allopurinol 300 mg daily  Discharge planning when stable 9/23 Status post BM bx  D/Romero Hydrea today, WBC- 4.5  Monitor CBC/Lytes and transfuse/replete PRN  Monitor TLS labs q 12  Strict Is and Os/daily weights/Mouth Care  Antiemetics   IVF  Continue Allopurinol 300 mg daily  10/1 Anemia: will transfuse 1 unit of PRBC   10/1 Thrombocytopenia: 1 unit of Platelets today.

## 2019-10-01 NOTE — PROVIDER CONTACT NOTE (CRITICAL VALUE NOTIFICATION) - TEST AND RESULT REPORTED:
.0
.56
Hemaglobin 7.0
Hgb= 6.4. Hct= 21.3
WBC- 111.41
hgb 6.9
platelet 17
wbc 120.56
Hgb= 6.3. Hct= 19.9

## 2019-10-01 NOTE — PROGRESS NOTE ADULT - PROBLEM SELECTOR PLAN 10
No pharmacologic ppx 2/2 port placement and worsening thrombocytopenia  Encourage ambulation      Contact info [phone]

## 2019-10-01 NOTE — PROVIDER CONTACT NOTE (CRITICAL VALUE NOTIFICATION) - PERSON GIVING RESULT:
Camilla
Camilla [facility]
Crystal Dumont
Faraz
Isaac Colón
Jeff Oglesby
Kiah Oglesby
Lab
jai carmel/ lab
Madison Dawn

## 2019-10-01 NOTE — PROGRESS NOTE ADULT - PROBLEM SELECTOR PLAN 9
Chronic rash to upper back and feet resolved.  No primary lesions appreciated on exam, all appear secondary to scratching  Can be 2/2 underlying malignancy, CKD or obstructive process if the liver  Continue Vaseline and Triamcinolone ointment and Sarna cream   Dermatology following

## 2019-10-02 ENCOUNTER — TRANSCRIPTION ENCOUNTER (OUTPATIENT)
Age: 84
End: 2019-10-02

## 2019-10-02 ENCOUNTER — OUTPATIENT (OUTPATIENT)
Dept: OUTPATIENT SERVICES | Facility: HOSPITAL | Age: 84
LOS: 1 days | End: 2019-10-02
Payer: COMMERCIAL

## 2019-10-02 ENCOUNTER — OUTPATIENT (OUTPATIENT)
Dept: OUTPATIENT SERVICES | Facility: HOSPITAL | Age: 84
LOS: 1 days | Discharge: HOME | End: 2019-10-02
Payer: COMMERCIAL

## 2019-10-02 VITALS
HEART RATE: 72 BPM | RESPIRATION RATE: 18 BRPM | DIASTOLIC BLOOD PRESSURE: 45 MMHG | SYSTOLIC BLOOD PRESSURE: 121 MMHG | TEMPERATURE: 98 F | OXYGEN SATURATION: 99 %

## 2019-10-02 DIAGNOSIS — C93.10 CHRONIC MYELOMONOCYTIC LEUKEMIA NOT HAVING ACHIEVED REMISSION: ICD-10-CM

## 2019-10-02 DIAGNOSIS — R58 HEMORRHAGE, NOT ELSEWHERE CLASSIFIED: ICD-10-CM

## 2019-10-02 LAB
ALBUMIN SERPL ELPH-MCNC: 2.9 G/DL — LOW (ref 3.3–5)
ALP SERPL-CCNC: 101 U/L — SIGNIFICANT CHANGE UP (ref 40–120)
ALT FLD-CCNC: 15 U/L — SIGNIFICANT CHANGE UP (ref 10–45)
ANION GAP SERPL CALC-SCNC: 10 MMOL/L — SIGNIFICANT CHANGE UP (ref 5–17)
AST SERPL-CCNC: 15 U/L — SIGNIFICANT CHANGE UP (ref 10–40)
BASOPHILS # BLD AUTO: 0 K/UL — SIGNIFICANT CHANGE UP (ref 0–0.2)
BASOPHILS NFR BLD AUTO: 0 % — SIGNIFICANT CHANGE UP (ref 0–2)
BILIRUB SERPL-MCNC: 1.2 MG/DL — SIGNIFICANT CHANGE UP (ref 0.2–1.2)
BUN SERPL-MCNC: 20 MG/DL — SIGNIFICANT CHANGE UP (ref 7–23)
CALCIUM SERPL-MCNC: 7.3 MG/DL — LOW (ref 8.4–10.5)
CHLORIDE SERPL-SCNC: 104 MMOL/L — SIGNIFICANT CHANGE UP (ref 96–108)
CO2 SERPL-SCNC: 20 MMOL/L — LOW (ref 22–31)
CREAT SERPL-MCNC: 0.95 MG/DL — SIGNIFICANT CHANGE UP (ref 0.5–1.3)
EOSINOPHIL # BLD AUTO: 0.1 K/UL — SIGNIFICANT CHANGE UP (ref 0–0.5)
EOSINOPHIL NFR BLD AUTO: 3 % — SIGNIFICANT CHANGE UP (ref 0–6)
GLUCOSE BLDC GLUCOMTR-MCNC: 198 MG/DL — HIGH (ref 70–99)
GLUCOSE BLDC GLUCOMTR-MCNC: 261 MG/DL — HIGH (ref 70–99)
GLUCOSE SERPL-MCNC: 168 MG/DL — HIGH (ref 70–99)
HCT VFR BLD CALC: 24.6 % — LOW (ref 39–50)
HGB BLD-MCNC: 8.3 G/DL — LOW (ref 13–17)
LDH SERPL L TO P-CCNC: 699 U/L — HIGH (ref 50–242)
LYMPHOCYTES # BLD AUTO: 0.29 K/UL — LOW (ref 1–3.3)
LYMPHOCYTES # BLD AUTO: 9 % — LOW (ref 13–44)
MAGNESIUM SERPL-MCNC: 1.6 MG/DL — SIGNIFICANT CHANGE UP (ref 1.6–2.6)
MCHC RBC-ENTMCNC: 30 PG — SIGNIFICANT CHANGE UP (ref 27–34)
MCHC RBC-ENTMCNC: 33.7 GM/DL — SIGNIFICANT CHANGE UP (ref 32–36)
MCV RBC AUTO: 88.8 FL — SIGNIFICANT CHANGE UP (ref 80–100)
MONOCYTES # BLD AUTO: 0.16 K/UL — SIGNIFICANT CHANGE UP (ref 0–0.9)
MONOCYTES NFR BLD AUTO: 5 % — SIGNIFICANT CHANGE UP (ref 2–14)
NEUTROPHILS # BLD AUTO: 2.64 K/UL — SIGNIFICANT CHANGE UP (ref 1.8–7.4)
NEUTROPHILS NFR BLD AUTO: 82 % — HIGH (ref 43–77)
NON-GYNECOLOGICAL CYTOLOGY STUDY: SIGNIFICANT CHANGE UP
PHOSPHATE SERPL-MCNC: 2.1 MG/DL — LOW (ref 2.5–4.5)
PLATELET # BLD AUTO: 34 K/UL — LOW (ref 150–400)
POTASSIUM SERPL-MCNC: 3.5 MMOL/L — SIGNIFICANT CHANGE UP (ref 3.5–5.3)
POTASSIUM SERPL-SCNC: 3.5 MMOL/L — SIGNIFICANT CHANGE UP (ref 3.5–5.3)
PROT SERPL-MCNC: 5.2 G/DL — LOW (ref 6–8.3)
RBC # BLD: 2.77 M/UL — LOW (ref 4.2–5.8)
RBC # FLD: 17.2 % — HIGH (ref 10.3–14.5)
SODIUM SERPL-SCNC: 134 MMOL/L — LOW (ref 135–145)
URATE SERPL-MCNC: 1.6 MG/DL — LOW (ref 3.4–8.8)
WBC # BLD: 3.18 K/UL — LOW (ref 3.8–10.5)
WBC # FLD AUTO: 3.18 K/UL — LOW (ref 3.8–10.5)

## 2019-10-02 PROCEDURE — 99231 SBSQ HOSP IP/OBS SF/LOW 25: CPT | Mod: GC

## 2019-10-02 RX ORDER — MAGNESIUM SULFATE 500 MG/ML
1 VIAL (ML) INJECTION ONCE
Refills: 0 | Status: COMPLETED | OUTPATIENT
Start: 2019-10-02 | End: 2019-10-02

## 2019-10-02 RX ORDER — SODIUM,POTASSIUM PHOSPHATES 278-250MG
2 POWDER IN PACKET (EA) ORAL ONCE
Refills: 0 | Status: COMPLETED | OUTPATIENT
Start: 2019-10-02 | End: 2019-10-02

## 2019-10-02 RX ORDER — POTASSIUM PHOSPHATE, MONOBASIC POTASSIUM PHOSPHATE, DIBASIC 236; 224 MG/ML; MG/ML
15 INJECTION, SOLUTION INTRAVENOUS ONCE
Refills: 0 | Status: COMPLETED | OUTPATIENT
Start: 2019-10-02 | End: 2019-10-02

## 2019-10-02 RX ADMIN — Medication 1 APPLICATION(S): at 06:10

## 2019-10-02 RX ADMIN — SODIUM CHLORIDE 75 MILLILITER(S): 9 INJECTION INTRAMUSCULAR; INTRAVENOUS; SUBCUTANEOUS at 06:07

## 2019-10-02 RX ADMIN — CHLORHEXIDINE GLUCONATE 1 APPLICATION(S): 213 SOLUTION TOPICAL at 06:10

## 2019-10-02 RX ADMIN — Medication 100 MILLIGRAM(S): at 06:09

## 2019-10-02 RX ADMIN — Medication 5 MILLILITER(S): at 08:05

## 2019-10-02 RX ADMIN — Medication 1: at 08:01

## 2019-10-02 RX ADMIN — FINASTERIDE 5 MILLIGRAM(S): 5 TABLET, FILM COATED ORAL at 13:20

## 2019-10-02 RX ADMIN — Medication 100 MILLIGRAM(S): at 13:16

## 2019-10-02 RX ADMIN — BISOPROLOL FUMARATE 5 MILLIGRAM(S): 10 TABLET, FILM COATED ORAL at 06:10

## 2019-10-02 RX ADMIN — PRAMOXINE HYDROCHLORIDE 1 APPLICATION(S): 150 AEROSOL, FOAM RECTAL at 06:09

## 2019-10-02 RX ADMIN — TENOFOVIR DISOPROXIL FUMARATE 300 MILLIGRAM(S): 300 TABLET, FILM COATED ORAL at 13:19

## 2019-10-02 RX ADMIN — Medication 2 TABLET(S): at 15:16

## 2019-10-02 RX ADMIN — AMLODIPINE BESYLATE 5 MILLIGRAM(S): 2.5 TABLET ORAL at 06:10

## 2019-10-02 RX ADMIN — Medication 10 MILLIGRAM(S): at 13:21

## 2019-10-02 RX ADMIN — Medication 100 MILLIGRAM(S): at 06:11

## 2019-10-02 RX ADMIN — Medication 3: at 13:17

## 2019-10-02 RX ADMIN — Medication 1 APPLICATION(S): at 06:09

## 2019-10-02 RX ADMIN — Medication 100 GRAM(S): at 13:21

## 2019-10-02 NOTE — PROGRESS NOTE ADULT - REASON FOR ADMISSION
84M w/ generalized weakness and SOB

## 2019-10-02 NOTE — PROGRESS NOTE ADULT - SUBJECTIVE AND OBJECTIVE BOX
Diagnosis: CMMoL    Protocol/Chemo Regimen: TBD    :  wife interpreting , refusing [place]  service.    Pt endorsed: No acute complaints     Review of Systems: Patient denies headache, dizziness, visual changes, chest pain, palpitations, SOB, nausea, vomiting, diarrhea, dysuria.    Pain scale: 2/10 abdominal pain                                   Diet: Consistent Carb    Allergies: No Known Allergies      ANTIMICROBIALS  levoFLOXacin  Tablet 250 milliGRAM(s) Oral every 24 hours  tenofovir disoproxil fumarate (VIREAD) 300 milliGRAM(s) Oral <User Schedule>      HEME/ONC MEDICATIONS      STANDING MEDICATIONS  allopurinol 300 milliGRAM(s) Oral daily  amLODIPine   Tablet 5 milliGRAM(s) Oral daily  AQUAPHOR (petrolatum Ointment) 1 Application(s) Topical two times a day  benzonatate 100 milliGRAM(s) Oral three times a day  Biotene Dry Mouth Oral Rinse 5 milliLiter(s) Swish and Spit five times a day  bisoprolol   Tablet 5 milliGRAM(s) Oral daily  chlorhexidine 2% Cloths 1 Application(s) Topical <User Schedule>  dextrose 5%. 1000 milliLiter(s) IV Continuous <Continuous>  dextrose 50% Injectable 12.5 Gram(s) IV Push once  dextrose 50% Injectable 25 Gram(s) IV Push once  dextrose 50% Injectable 25 Gram(s) IV Push once  docusate sodium 100 milliGRAM(s) Oral three times a day  finasteride 5 milliGRAM(s) Oral daily  influenza   Vaccine 0.5 milliLiter(s) IntraMuscular once  insulin lispro (HumaLOG) corrective regimen sliding scale   SubCutaneous three times a day before meals  insulin lispro (HumaLOG) corrective regimen sliding scale   SubCutaneous at bedtime  lidocaine 2% Injectable 1 Vial(s) Local Injection once  melatonin 5 milliGRAM(s) Oral at bedtime  nicotine - 21 mG/24Hr(s) Patch 1 patch Transdermal daily  phytonadione   Solution 10 milliGRAM(s) Oral daily  polyethylene glycol 3350 17 Gram(s) Oral daily  potassium phosphate IVPB 15 milliMole(s) IV Intermittent once  pramoxine 1% Topical Lotion 1 Application(s) Topical two times a day  senna 2 Tablet(s) Oral at bedtime  simvastatin 10 milliGRAM(s) Oral at bedtime  sodium chloride 0.9%. 1000 milliLiter(s) IV Continuous <Continuous>  tamsulosin 0.4 milliGRAM(s) Oral at bedtime  triamcinolone 0.1% Ointment 1 Application(s) Topical two times a day      PRN MEDICATIONS  acetaminophen   Tablet .. 650 milliGRAM(s) Oral every 6 hours PRN  dextrose 40% Gel 15 Gram(s) Oral once PRN  glucagon  Injectable 1 milliGRAM(s) IntraMuscular once PRN  simethicone 80 milliGRAM(s) Chew every 6 hours PRN  zolpidem 5 milliGRAM(s) Oral at bedtime PRN        Vital Signs Last 24 Hrs  T(C): 36.6 (02 Oct 2019 06:10), Max: 37.5 (01 Oct 2019 19:50)  T(F): 97.9 (02 Oct 2019 06:10), Max: 99.5 (01 Oct 2019 19:50)  HR: 71 (02 Oct 2019 06:10) (68 - 72)  BP: 131/53 (02 Oct 2019 06:10) (105/52 - 135/60)  BP(mean): --  RR: 18 (02 Oct 2019 06:10) (16 - 18)  SpO2: 98% (02 Oct 2019 06:10) (98% - 100%)      PHYSICAL EXAM  General: NAD  HEENT: PERRLA, EOMOI, clear oropharynx, anicteric sclera, pink conjunctiva  Neck: supple  CV: (+) S1/S2 RRR  Lungs: clear to auscultation, no wheezes or rales  Abdomen: soft, non-tender, non-distended (+) BS  Ext: no clubbing, cyanosis or edema  Skin: no rashes and no petechiae  Neuro: alert and oriented X 3, no focal deficits  Central Line: Right CW Mediport      RECENT CULTURES:  09-25 @ 19:05  .Blood  --  --  --    No growth at 5 days.  --  09-25 @ 16:31  .Urine  --  --  --    No growth  --  09-25 @ 15:45  .Blood  --  --  --    No growth at 5 days.  --        LABS:                        8.3    3.18  )-----------( 34       ( 02 Oct 2019 06:44 )             24.6         Mean Cell Volume : 88.8 fl  Mean Cell Hemoglobin : 30.0 pg  Mean Cell Hemoglobin Concentration : 33.7 gm/dL  Auto Neutrophil # : x  Auto Lymphocyte # : x  Auto Monocyte # : x  Auto Eosinophil # : x  Auto Basophil # : x  Auto Neutrophil % : x  Auto Lymphocyte % : x  Auto Monocyte % : x  Auto Eosinophil % : x  Auto Basophil % : x      10-02    134<L>  |  104  |  20  ----------------------------<  168<H>  3.5   |  20<L>  |  0.95    Ca    7.3<L>      02 Oct 2019 06:44  Phos  2.1     10-02  Mg     1.8     10-01    TPro  5.2<L>  /  Alb  2.9<L>  /  TBili  1.2  /  DBili  x   /  AST  15  /  ALT  15  /  AlkPhos  101  10-02      Mg --  Phos 2.1            Uric Acid 1.6        RADIOLOGY & ADDITIONAL STUDIES:  CT Abdomen and Pelvis No Cont (10.01.19 @ 09:32)   Stable small volume hemoperitoneum.

## 2019-10-02 NOTE — PROGRESS NOTE ADULT - PROBLEM SELECTOR PLAN 10
No pharmacologic ppx 2/2 port placement and worsening thrombocytopenia  Encourage ambulation      Contact info [phone]

## 2019-10-02 NOTE — PROGRESS NOTE ADULT - PROBLEM SELECTOR PLAN 1
9/23 Status post BM bx  D/Romero Hydrea today, WBC- 4.5  Monitor CBC/Lytes and transfuse/replete PRN  Monitor TLS labs q 12  Strict Is and Os/daily weights/Mouth Care  Antiemetics   IVF  Continue Allopurinol 300 mg daily  10/1 Thrombocytopenia - for 1 unit of SDP today

## 2019-10-02 NOTE — PROGRESS NOTE ADULT - ATTENDING COMMENTS
83 yo M Mandarin-speaking, p/w leukocytosis also with lung masses ? 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 Off Hydrea since 9/30  -s/p liver bx 9/27 -pt has a second malignancy, likely metastatic lung CA with liver mets given long hx smoking. Retroperitoneal bleed on 9/29 CT abdomen done for drop in Hb. Monitor CBC 2x/day, repeat CT A/P I- today to assess if bleed is stable. Keep plt>50k/ul, getting PRBC today   -monitor for tumor lysis syndrome -on Allopurinol  -hx CAD and stents -ASA 81mg on hold given plt>50. Echo -EF 65%  -hep B -core Ab +,  PCR pending, cont Viread. Hep C, and HIV nonreactive. Quantiferron gold indeterminate  -dispo planning -d/c home with outpt f/u when stable 85 yo M Mandarin-speaking, p/w leukocytosis also with lung masses ? 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 Off Hydrea since 9/30  -s/p liver bx 9/27 -pt has a second malignancy, likely metastatic lung CA with liver mets given long hx smoking. Retroperitoneal bleed on 9/29 CT abdomen done for drop in Hb -stable, CT A/P I- repeated 10/1 showed stable small bleed, pt's Hb stabilized. Getting plt to keep count>50k  -monitor for tumor lysis syndrome -on Allopurinol  -hx CAD and stents -ASA 81mg on hold given plt>50. Echo -EF 65%  -hep B -core Ab +,  PCR pending, cont Viread. Hep C, and HIV nonreactive. Quantiferron gold indeterminate  -dispo planning -d/c home with outpt f/u when stable

## 2019-10-02 NOTE — PROGRESS NOTE ADULT - PROBLEM SELECTOR PROBLEM 4
CAD (coronary artery disease)
PNA (pneumonia)
CAD (coronary artery disease)
PNA (pneumonia)
CAD (coronary artery disease)

## 2019-10-02 NOTE — PROGRESS NOTE ADULT - PROBLEM SELECTOR PLAN 3
with metastasis to the liver diagnosed by needle bx in [facility]  CT C/A/P shows 4.3 x 3.4 cm mass posterior left upper lobe. 1.3 cm anterior right   upper lobe nodule. These are likely neoplastic. Multiple hypodense lesions within the liver likely metastatic  Will likely need to manage acute leukemia before treating Lung CA,  s/p Liver biopsy on 9/27. follow up results

## 2019-10-02 NOTE — PROGRESS NOTE ADULT - PROBLEM/PLAN-8
DISPLAY PLAN FREE TEXT
WDL
DISPLAY PLAN FREE TEXT

## 2019-10-02 NOTE — DISCHARGE NOTE NURSING/CASE MANAGEMENT/SOCIAL WORK - NSDCFUADDAPPT_GEN_ALL_CORE_FT
To the [facility] to see Dr. Cabral on Monday 10/3/19 @ 1.30 pm followed by appointment for possible platelets on the same day @ 3PM (PLEASE ARRIVE AT 1 PM TO [facility] SINCE THIS IS YOUR FIRST APPOINTMENT)    To the [facility] for possible platelets on 10/4/19 @ 4 pm for possible platelets.     Call for follow up with Dr. Alfaro (Dermatology) with in one week of discharge

## 2019-10-02 NOTE — PROGRESS NOTE ADULT - PROBLEM SELECTOR PROBLEM 10
Need for prophylactic measure
Prophylactic measure
Need for prophylactic measure
Prophylactic measure

## 2019-10-02 NOTE — PROGRESS NOTE ADULT - PROVIDER SPECIALTY LIST ADULT
Heme/Onc
Infectious Disease
Internal Medicine
Intervent Radiology
Intervent Radiology
Internal Medicine
Vascular Surgery
Heme/Onc
Internal Medicine

## 2019-10-02 NOTE — DISCHARGE NOTE NURSING/CASE MANAGEMENT/SOCIAL WORK - PATIENT PORTAL LINK FT
You can access the FollowMyHealth Patient Portal offered by [facility] by registering at the following website: [email or web address]. By joining [facility]’s FollowMyHealth portal, you will also be able to view your health information using other applications (apps) compatible with our system.

## 2019-10-03 ENCOUNTER — RESULT REVIEW (OUTPATIENT)
Age: 84
End: 2019-10-03

## 2019-10-03 ENCOUNTER — APPOINTMENT (OUTPATIENT)
Dept: INFUSION THERAPY | Facility: HOSPITAL | Age: 84
End: 2019-10-03

## 2019-10-03 ENCOUNTER — APPOINTMENT (OUTPATIENT)
Dept: HEMATOLOGY ONCOLOGY | Facility: CLINIC | Age: 84
End: 2019-10-03
Payer: COMMERCIAL

## 2019-10-03 VITALS
BODY MASS INDEX: 22.67 KG/M2 | HEART RATE: 71 BPM | TEMPERATURE: 97.7 F | DIASTOLIC BLOOD PRESSURE: 56 MMHG | WEIGHT: 141.07 LBS | OXYGEN SATURATION: 100 % | HEIGHT: 66 IN | SYSTOLIC BLOOD PRESSURE: 108 MMHG | RESPIRATION RATE: 18 BRPM

## 2019-10-03 DIAGNOSIS — Z86.73 PERSONAL HISTORY OF TRANSIENT ISCHEMIC ATTACK (TIA), AND CEREBRAL INFARCTION W/OUT RESIDUAL DEFICITS: ICD-10-CM

## 2019-10-03 DIAGNOSIS — E11.9 TYPE 2 DIABETES MELLITUS W/OUT COMPLICATIONS: ICD-10-CM

## 2019-10-03 DIAGNOSIS — R16.2 HEPATOMEGALY WITH SPLENOMEGALY, NOT ELSEWHERE CLASSIFIED: ICD-10-CM

## 2019-10-03 DIAGNOSIS — Z78.9 OTHER SPECIFIED HEALTH STATUS: ICD-10-CM

## 2019-10-03 DIAGNOSIS — Z82.49 FAMILY HISTORY OF ISCHEMIC HEART DISEASE AND OTHER DISEASES OF THE CIRCULATORY SYSTEM: ICD-10-CM

## 2019-10-03 DIAGNOSIS — Z87.891 PERSONAL HISTORY OF NICOTINE DEPENDENCE: ICD-10-CM

## 2019-10-03 DIAGNOSIS — Z86.79 PERSONAL HISTORY OF OTHER DISEASES OF THE CIRCULATORY SYSTEM: ICD-10-CM

## 2019-10-03 DIAGNOSIS — R76.12 NONSPECIFIC REACTION TO CELL MEDIATED IMMUNITY MEASUREMENT OF GAMMA INTERFERON ANTIGEN RESPONSE W/OUT ACTIVE TUBERCULOSIS: ICD-10-CM

## 2019-10-03 DIAGNOSIS — R76.8 OTHER SPECIFIED ABNORMAL IMMUNOLOGICAL FINDINGS IN SERUM: ICD-10-CM

## 2019-10-03 DIAGNOSIS — Z87.898 PERSONAL HISTORY OF OTHER SPECIFIED CONDITIONS: ICD-10-CM

## 2019-10-03 DIAGNOSIS — N40.0 BENIGN PROSTATIC HYPERPLASIA WITHOUT LOWER URINARY TRACT SYMPMS: ICD-10-CM

## 2019-10-03 DIAGNOSIS — Z82.3 FAMILY HISTORY OF STROKE: ICD-10-CM

## 2019-10-03 DIAGNOSIS — I10 ESSENTIAL (PRIMARY) HYPERTENSION: ICD-10-CM

## 2019-10-03 DIAGNOSIS — C79.9 SECONDARY MALIGNANT NEOPLASM OF UNSPECIFIED SITE: ICD-10-CM

## 2019-10-03 LAB
EOSINOPHIL NFR BLD AUTO: 2 % — SIGNIFICANT CHANGE UP (ref 0–6)
G6PD RBC-CCNC: SIGNIFICANT CHANGE UP
HCT VFR BLD CALC: 31.5 % — LOW (ref 39–50)
HGB BLD-MCNC: 10.4 G/DL — LOW (ref 13–17)
LYMPHOCYTES # BLD AUTO: 23 % — SIGNIFICANT CHANGE UP (ref 13–44)
MCHC RBC-ENTMCNC: 30.6 PG — SIGNIFICANT CHANGE UP (ref 27–34)
MCHC RBC-ENTMCNC: 33.1 G/DL — SIGNIFICANT CHANGE UP (ref 32–36)
MCV RBC AUTO: 92.5 FL — SIGNIFICANT CHANGE UP (ref 80–100)
MONOCYTES NFR BLD AUTO: 19 % — HIGH (ref 2–14)
NEUTROPHILS # BLD AUTO: LOW K/UL (ref 1.8–7.4)
NEUTROPHILS NFR BLD AUTO: 56 % — SIGNIFICANT CHANGE UP (ref 43–77)
NRBC # BLD: 8 /100 — HIGH (ref 0–0)
PLAT MORPH BLD: NORMAL — SIGNIFICANT CHANGE UP
PLATELET # BLD AUTO: 29 K/UL — LOW (ref 150–400)
RBC # BLD: 3.4 M/UL — LOW (ref 4.2–5.8)
RBC # FLD: 17.1 % — HIGH (ref 10.3–14.5)
RBC BLD AUTO: SIGNIFICANT CHANGE UP
WBC # BLD: 2.9 K/UL — LOW (ref 3.8–10.5)
WBC # FLD AUTO: 2.9 K/UL — LOW (ref 3.8–10.5)

## 2019-10-03 PROCEDURE — 99215 OFFICE O/P EST HI 40 MIN: CPT

## 2019-10-03 RX ORDER — ALLOPURINOL 300 MG/1
300 TABLET ORAL DAILY
Refills: 0 | Status: DISCONTINUED | COMMUNITY
Start: 2019-10-03 | End: 2019-10-03

## 2019-10-03 RX ORDER — SITAGLIPTIN 100 MG/1
100 TABLET, FILM COATED ORAL DAILY
Refills: 0 | Status: ACTIVE | COMMUNITY
Start: 2019-10-03

## 2019-10-03 RX ORDER — LEVOFLOXACIN 250 MG/1
250 TABLET, FILM COATED ORAL DAILY
Refills: 0 | Status: ACTIVE | COMMUNITY
Start: 2019-10-03

## 2019-10-03 RX ORDER — FINASTERIDE 5 MG/1
5 TABLET, FILM COATED ORAL DAILY
Refills: 0 | Status: ACTIVE | COMMUNITY
Start: 2019-10-03

## 2019-10-03 RX ORDER — METFORMIN HYDROCHLORIDE 500 MG/1
500 TABLET, COATED ORAL TWICE DAILY
Refills: 0 | Status: ACTIVE | COMMUNITY
Start: 2019-10-03

## 2019-10-03 RX ORDER — TRIAMCINOLONE ACETONIDE 1 MG/G
0.1 CREAM TOPICAL TWICE DAILY
Refills: 0 | Status: ACTIVE | COMMUNITY
Start: 2019-10-03

## 2019-10-03 RX ORDER — TAMSULOSIN HYDROCHLORIDE 0.4 MG/1
0.4 CAPSULE ORAL DAILY
Refills: 0 | Status: ACTIVE | COMMUNITY
Start: 2019-10-03

## 2019-10-03 NOTE — PHYSICAL EXAM
[Fully active, able to carry on all pre-disease performance without restriction] : Status 0 - Fully active, able to carry on all pre-disease performance without restriction [Normal] : affect appropriate [Thin] : thin [Ulcers] : no ulcers [Mucositis] : no mucositis [Thrush] : no thrush [de-identified] : R Port -slight hematoma at site, Steri strips [de-identified] : weak pulses, chronic stasis hyperpigmentation, min LE edema L>R chronic [de-identified] : hepatomegaly [de-identified] : R inguinal area 2cm LN? [de-identified] : excoriated, red papules, dry skin [de-identified] : unsteady gait, walks with assistance (1 person)

## 2019-10-03 NOTE — REASON FOR VISIT
[ Service] : provided by  Service [FreeTextEntry1] : [id number] [FreeTextEntry3] : Mandarin Chinese [FreeTextEntry2] : Sarai [TWNoteComboBox1] : Other

## 2019-10-03 NOTE — ASSESSMENT
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 As per IPSS prognosis score, pt is high risk -median survival is 11 months, with 54% chance of transformation to AML. Thus, from this standpoint, pt requires treatment with Dacogen 20mg/m2 days 1-5 q28 days indefinitely. \par However, pt also has metastatic SCC most likely lung primary; PDL-1 staining is pending and this is also affecting is survival. Case d/w Onc Dr. Porras -may be able to treat both if pt can get immunotherapy. He had a Port placed in preparation for chemotherapy treatment. Thus, will await final biopsy report from liver bx. Patient and wife aware and agree with plan\par \par -s/p retroperitoneal bleed after liver bx on 9/27/19. Hb stable, improved. Will give plt today and tomorrow to keep plt count>50k/ul, then back to transfusion parameter for platelets <20k/ul\par \par -CBC with possible plt (transfuse for <20k/ul) 2x/wk starting next week Mon/Fri. Off Hydrea for now since counts are low\par \par -cont Viread for prophylaxis -hep B core Ab+\par \par -will need to repeat Quantiferron gold -indeterminate; pt asympatomatic\par \par -will finish staging work up for metastatic lung CA -PET/CT and MRI brain with and without contrast ordered today, pt to be scheduled to see Onc Dr. Porras to jointly manage patient -PD-L1 IHC pending\par \par -follow up in 2-3wks

## 2019-10-03 NOTE — REVIEW OF SYSTEMS
[Negative] : Allergic/Immunologic [Recent Change In Weight] : ~T recent weight change [Lower Ext Edema] : lower extremity edema [Skin Rash] : skin rash [Easy Bleeding] : a tendency for easy bleeding [Easy Bruising] : a tendency for easy bruising [Fever] : no fever [Chills] : no chills [Night Sweats] : no night sweats [Fatigue] : no fatigue [Eye Pain] : no eye pain [Red Eyes] : eyes not red [Chest Pain] : no chest pain [Joint Stiffness] : no joint stiffness [Proptosis] : no proptosis [FreeTextEntry2] : 10 lbs weight loss x2-3 yrs [FreeTextEntry5] : min LE edema [de-identified] : rash in hospital eval'ed by derm -excoriations; improved after cream applied [de-identified] : in wheelchair today

## 2019-10-03 NOTE — HISTORY OF PRESENT ILLNESS
[de-identified] : Mr. Varela was referred to my office after recent admission at [facility] from 9/23/19 to 10/2/19 during which he was diagnosed with CMMoL (Chronic Myelomonocytic Leukemia). According to the wife, the patient has had chronically high wbc count (in the 30's) since 2012, was diagnosed in 2012 with a 'blood disease' for which he has not required any treatment. Prior to admission, the patient has had worsening SOB, chills and fatigue for which he went to ED. Of note, patient has had a long hx smoking and weight loss and while visiting in [facility] he had a lung biopsy in August 2019 showing 'lung cancer'; he flew to [place] (where his son lives) in order to get treatment here -the family does no have the medical records or biopsy results. \par \par On admission at [facility], patient had a CT angio on 9/23/19 which r/o'ed PE, but showed a 4.3 x 3.4 cm mass posterior left upper lobe. 1.3 cm anterior right \par upper lobe nodule. These are likely neoplastic. CT A/P on 9/24/19 showed hepatosplenomegaly with splenic infarcts and hepatic masses (likely mets). His blood count on 9/23/19 showed  wbc of 129k/ul with 20% blasts, Hb 7.9 plt 107 and he had acute renal failure. A bone marrow biopsy was done on 9/23/19 showing CMMoL with 10-15% blasts. \par He was treated with Hydroxyurea to lower blood counts -he had 1gram HA q8hrs, blood counts normalized and HA was stopped on 9/30/19. \par He had a biopsy of the liver lesions on 9/27/19 showing squamous cell carcinoma. Post biopsy, patient was supposed to be discharged home -however, he did not respond to PRBC transfusion, and CT A/P showed a small  retroperitoneal bleed. Platelet counts were kept to >=50k/ul with transfusions, CBC's were checked twice daily, repeat CT A/P was done on 10/1/19 and Hb stabilized and repeat CT showed no additional bleeding, he was discharged home on 10/2/19.  [de-identified] : The patient is here to establish care for newly dx'ed CMMoL and metastatic lung CA (SCC). He feels well; has no fevers, no bleeding. No back pain (s/p retroperitoneal bleed after liver biopsy)

## 2019-10-03 NOTE — RESULTS/DATA
[FreeTextEntry1] : Today's CBC (On 10/3/19) wbc 2.9 with 59%N, Hb 10.4 plt 29\par \par The peripheral smear was reviewed. It showed significant dysplastic neutrophils with hypolobation, hypopigmentation, few blasts. Red cells with moderate aniso and poikilocytosis with nucleated rbc's, retics, basophilic stippling. Platelets decreased in number, no large plt noted\par \par The previous medical records were reviewed\par LABS\par On 10/2/19 wbc 3.2 Hb 8.2 plt 34\par On 9/23/19 wbc of 129k/ul with 20% blasts, Hb 7.9 plt 107\par \par RADIOLOGY\par On 9/24/19 CT A/P\par \par IMPRESSION: \par \par Hepatomegaly, with bilobar hypodense lesions, suspicious for metastatic \par disease.\par \par Splenomegaly, with multiple splenic infarcts.\par \par Retroperitoneal lymphadenopathy.\par \par Enlarged prostate gland.\par \par A 4.8 cm infrarenal abdominal aortic aneurysm, status post bifurcated \par aortic stent graft.\par \par On 9/23/19 CT angio\par IMPRESSION: \par \par 1.  No central pulmonary embolus. The subsegmental branches are not \par evaluated secondary to respiratory motion.\par \par 2.  4.3 x 3.4 cm mass posterior left upper lobe. 1.3 cm anterior right \par upper lobe nodule. These are likely neoplastic.\par \par 3.  Multiple hypodense lesions within the liver likely metastatic.\par \par 4.  Splenomegaly. Peripheral hypodensities within the spleen may \par represent infarct or metastatic lesions or combination of both.\par \par \par PATHOLOGY\par On 9/27/19 Liver biopsy\par Addendum\par LIVER, US GUIDED CORE BIOPSY\par POSITIVE FOR MALIGNANT CELLS.  \par Metastatic carcinoma pending further classification\par \par Touch imprint slide and core biopsies shows malignant epithelial cells in syncytial groups and tissue fragments exhibiting pleomorphic nuclei with nuclear clearing and occasional prominent nucleoli in a necrotic background. Occasional mitotic figures are noted. Benign hepatocytes seen.\par Immunohistochemistry is pending, an addendum will follow.\par \par This addendum is being issued to report immunostain results.\par \par Immunohistochemistry:The neoplastic cells are positive for P-40,\par CK7 and negative for CK20, TTF-1, CDX 2, napsin, PSA, PAX 8,\par chromogranin, synaptophysin. JESSICA-3 shows focal faint positive\par staining. KI 67 proliferation index is 90%. This immunostaining\par pattern is seen in tumors of squamous differentiation. Please\par correlate with radiologic findings to determine primary site.\par PDL-1 staining is pending an addendum will be issued.\par \par On 9/23/19 BM bx\par Final Diagnosis\par 1, 2. 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 Bone marrow aspirate is hemodiluted; blast count\par based on IHC stains. As per clinical chart review, patient has\par history of persistent leukocytosis. 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 Clinical correlation and close followup is\par recommended.\par Comprehensive report with results of pending ancillary studies to\par follow.\par

## 2019-10-04 ENCOUNTER — APPOINTMENT (OUTPATIENT)
Dept: INFUSION THERAPY | Facility: HOSPITAL | Age: 84
End: 2019-10-04

## 2019-10-04 ENCOUNTER — RESULT REVIEW (OUTPATIENT)
Age: 84
End: 2019-10-04

## 2019-10-04 DIAGNOSIS — Z51.89 ENCOUNTER FOR OTHER SPECIFIED AFTERCARE: ICD-10-CM

## 2019-10-04 LAB
BASOPHILS NFR BLD AUTO: 2 % — SIGNIFICANT CHANGE UP (ref 0–2)
BLASTS # FLD: 12 % — HIGH (ref 0–0)
CHROM ANALY OVERALL INTERP SPEC-IMP: SIGNIFICANT CHANGE UP
EOSINOPHIL NFR BLD AUTO: 13 % — HIGH (ref 0–6)
HCT VFR BLD CALC: 29.3 % — LOW (ref 39–50)
HGB BLD-MCNC: 9.7 G/DL — LOW (ref 13–17)
LYMPHOCYTES # BLD AUTO: 15 % — SIGNIFICANT CHANGE UP (ref 13–44)
LYMPHOCYTES # BLD AUTO: SIGNIFICANT CHANGE UP (ref 1–3.3)
MCHC RBC-ENTMCNC: 31.1 PG — SIGNIFICANT CHANGE UP (ref 27–34)
MCHC RBC-ENTMCNC: 33.3 G/DL — SIGNIFICANT CHANGE UP (ref 32–36)
MCV RBC AUTO: 93.5 FL — SIGNIFICANT CHANGE UP (ref 80–100)
MONOCYTES NFR BLD AUTO: 35 % — HIGH (ref 2–14)
MYELOCYTES NFR BLD: 1 % — HIGH (ref 0–0)
NEUTROPHILS # BLD AUTO: SIGNIFICANT CHANGE UP (ref 1.8–7.4)
NEUTROPHILS NFR BLD AUTO: 22 % — LOW (ref 43–77)
NRBC # BLD: 10 /100 — HIGH (ref 0–0)
PLAT MORPH BLD: NORMAL — SIGNIFICANT CHANGE UP
PLATELET # BLD AUTO: 36 K/UL — LOW (ref 150–400)
RBC # BLD: 3.13 M/UL — LOW (ref 4.2–5.8)
RBC # FLD: 18.3 % — HIGH (ref 10.3–14.5)
RBC BLD AUTO: SIGNIFICANT CHANGE UP
WBC # BLD: 3.2 K/UL — LOW (ref 3.8–10.5)
WBC # FLD AUTO: 3.2 K/UL — LOW (ref 3.8–10.5)

## 2019-10-06 ENCOUNTER — FORM ENCOUNTER (OUTPATIENT)
Age: 84
End: 2019-10-06

## 2019-10-07 ENCOUNTER — APPOINTMENT (OUTPATIENT)
Dept: INFUSION THERAPY | Facility: HOSPITAL | Age: 84
End: 2019-10-07

## 2019-10-07 ENCOUNTER — RESULT REVIEW (OUTPATIENT)
Age: 84
End: 2019-10-07

## 2019-10-07 ENCOUNTER — APPOINTMENT (OUTPATIENT)
Dept: RADIOLOGY | Facility: IMAGING CENTER | Age: 84
End: 2019-10-07
Payer: MEDICARE

## 2019-10-07 ENCOUNTER — OUTPATIENT (OUTPATIENT)
Dept: OUTPATIENT SERVICES | Facility: HOSPITAL | Age: 84
LOS: 1 days | End: 2019-10-07
Payer: COMMERCIAL

## 2019-10-07 ENCOUNTER — MED ADMIN CHARGE (OUTPATIENT)
Age: 84
End: 2019-10-07

## 2019-10-07 DIAGNOSIS — C93.10 CHRONIC MYELOMONOCYTIC LEUKEMIA NOT HAVING ACHIEVED REMISSION: ICD-10-CM

## 2019-10-07 DIAGNOSIS — Z95.828 PRESENCE OF OTHER VASCULAR IMPLANTS AND GRAFTS: ICD-10-CM

## 2019-10-07 DIAGNOSIS — K12.30 ORAL MUCOSITIS (ULCERATIVE), UNSPECIFIED: ICD-10-CM

## 2019-10-07 LAB
BLASTS # FLD: 7 % — HIGH (ref 0–0)
EOSINOPHIL NFR BLD AUTO: 4 % — SIGNIFICANT CHANGE UP (ref 0–6)
HCT VFR BLD CALC: 32.3 % — LOW (ref 39–50)
HGB BLD-MCNC: 10.8 G/DL — LOW (ref 13–17)
LYMPHOCYTES # BLD AUTO: 7 % — LOW (ref 13–44)
MCHC RBC-ENTMCNC: 31.4 PG — SIGNIFICANT CHANGE UP (ref 27–34)
MCHC RBC-ENTMCNC: 33.5 G/DL — SIGNIFICANT CHANGE UP (ref 32–36)
MCV RBC AUTO: 93.7 FL — SIGNIFICANT CHANGE UP (ref 80–100)
METAMYELOCYTES # FLD: 1 % — HIGH (ref 0–0)
MONOCYTES NFR BLD AUTO: 67 % — HIGH (ref 2–14)
MYELOCYTES NFR BLD: 2 % — HIGH (ref 0–0)
NEUTROPHILS # BLD AUTO: LOW K/UL (ref 1.8–7.4)
NEUTROPHILS NFR BLD AUTO: 12 % — LOW (ref 43–77)
NRBC # BLD: 3 /100 — HIGH (ref 0–0)
PLAT MORPH BLD: NORMAL — SIGNIFICANT CHANGE UP
PLATELET # BLD AUTO: 35 K/UL — LOW (ref 150–400)
RBC # BLD: 3.45 M/UL — LOW (ref 4.2–5.8)
RBC # FLD: 18.2 % — HIGH (ref 10.3–14.5)
RBC BLD AUTO: SIGNIFICANT CHANGE UP
WBC # BLD: 6.7 K/UL — SIGNIFICANT CHANGE UP (ref 3.8–10.5)
WBC # FLD AUTO: 6.7 K/UL — SIGNIFICANT CHANGE UP (ref 3.8–10.5)

## 2019-10-07 PROCEDURE — 71045 X-RAY EXAM CHEST 1 VIEW: CPT | Mod: 26

## 2019-10-07 PROCEDURE — 71045 X-RAY EXAM CHEST 1 VIEW: CPT

## 2019-10-07 RX ORDER — DIPHENHYDRAMINE HYDROCHLORIDE AND LIDOCAINE HYDROCHLORIDE AND ALUMINUM HYDROXIDE AND MAGNESIUM HYDRO
KIT
Qty: 1 | Refills: 1 | Status: ACTIVE | COMMUNITY
Start: 2019-10-07 | End: 1900-01-01

## 2019-10-11 ENCOUNTER — APPOINTMENT (OUTPATIENT)
Dept: INFUSION THERAPY | Facility: HOSPITAL | Age: 84
End: 2019-10-11

## 2019-10-11 ENCOUNTER — RESULT REVIEW (OUTPATIENT)
Age: 84
End: 2019-10-11

## 2019-10-11 LAB
ANISOCYTOSIS BLD QL: SLIGHT — SIGNIFICANT CHANGE UP
BASO STIPL BLD QL SMEAR: PRESENT — SIGNIFICANT CHANGE UP
BLASTS # FLD: 1 % — HIGH (ref 0–0)
EOSINOPHIL NFR BLD AUTO: 1 % — SIGNIFICANT CHANGE UP (ref 0–6)
HCT VFR BLD CALC: 35.9 % — LOW (ref 39–50)
HGB BLD-MCNC: 11.2 G/DL — LOW (ref 13–17)
HYPOCHROMIA BLD QL: SLIGHT — SIGNIFICANT CHANGE UP
LYMPHOCYTES # BLD AUTO: 4 % — LOW (ref 13–44)
LYMPHOCYTES # BLD AUTO: SIGNIFICANT CHANGE UP (ref 1–3.3)
MACROCYTES BLD QL: SLIGHT — SIGNIFICANT CHANGE UP
MCHC RBC-ENTMCNC: 29.3 PG — SIGNIFICANT CHANGE UP (ref 27–34)
MCHC RBC-ENTMCNC: 31.3 G/DL — LOW (ref 32–36)
MCV RBC AUTO: 93.8 FL — SIGNIFICANT CHANGE UP (ref 80–100)
METAMYELOCYTES # FLD: 1 % — HIGH (ref 0–0)
MICROCYTES BLD QL: SLIGHT — SIGNIFICANT CHANGE UP
MONOCYTES NFR BLD AUTO: 76 % — HIGH (ref 2–14)
MYELOCYTES NFR BLD: 1 % — HIGH (ref 0–0)
NEUTROPHILS # BLD AUTO: SIGNIFICANT CHANGE UP (ref 1.8–7.4)
NEUTROPHILS NFR BLD AUTO: 16 % — LOW (ref 43–77)
NRBC # BLD: 1 /100 — HIGH (ref 0–0)
PLAT MORPH BLD: NORMAL — SIGNIFICANT CHANGE UP
PLATELET # BLD AUTO: 47 K/UL — LOW (ref 150–400)
POIKILOCYTOSIS BLD QL AUTO: SLIGHT — SIGNIFICANT CHANGE UP
POLYCHROMASIA BLD QL SMEAR: SLIGHT — SIGNIFICANT CHANGE UP
RBC # BLD: 3.82 M/UL — LOW (ref 4.2–5.8)
RBC # FLD: 17.5 % — HIGH (ref 10.3–14.5)
RBC BLD AUTO: ABNORMAL
WBC # BLD: 35.6 K/UL — HIGH (ref 3.8–10.5)
WBC # FLD AUTO: 35.6 K/UL — HIGH (ref 3.8–10.5)

## 2019-10-13 PROCEDURE — C1788: CPT

## 2019-10-13 PROCEDURE — 83036 HEMOGLOBIN GLYCOSYLATED A1C: CPT

## 2019-10-13 PROCEDURE — 47000 NEEDLE BIOPSY OF LIVER PERQ: CPT

## 2019-10-13 PROCEDURE — 81311 NRAS GENE VARIANTS EXON 2&3: CPT

## 2019-10-13 PROCEDURE — 86706 HEP B SURFACE ANTIBODY: CPT

## 2019-10-13 PROCEDURE — 36561 INSERT TUNNELED CV CATH: CPT

## 2019-10-13 PROCEDURE — 84295 ASSAY OF SERUM SODIUM: CPT

## 2019-10-13 PROCEDURE — 82947 ASSAY GLUCOSE BLOOD QUANT: CPT

## 2019-10-13 PROCEDURE — 83605 ASSAY OF LACTIC ACID: CPT

## 2019-10-13 PROCEDURE — 80048 BASIC METABOLIC PNL TOTAL CA: CPT

## 2019-10-13 PROCEDURE — 88275 CYTOGENETICS 100-300: CPT

## 2019-10-13 PROCEDURE — 81310 NPM1 GENE: CPT

## 2019-10-13 PROCEDURE — 82962 GLUCOSE BLOOD TEST: CPT

## 2019-10-13 PROCEDURE — 84132 ASSAY OF SERUM POTASSIUM: CPT

## 2019-10-13 PROCEDURE — 76937 US GUIDE VASCULAR ACCESS: CPT

## 2019-10-13 PROCEDURE — 85027 COMPLETE CBC AUTOMATED: CPT

## 2019-10-13 PROCEDURE — 85670 THROMBIN TIME PLASMA: CPT

## 2019-10-13 PROCEDURE — 81405 MOPATH PROCEDURE LEVEL 6: CPT

## 2019-10-13 PROCEDURE — 74177 CT ABD & PELVIS W/CONTRAST: CPT

## 2019-10-13 PROCEDURE — 85384 FIBRINOGEN ACTIVITY: CPT

## 2019-10-13 PROCEDURE — 87798 DETECT AGENT NOS DNA AMP: CPT

## 2019-10-13 PROCEDURE — 84100 ASSAY OF PHOSPHORUS: CPT

## 2019-10-13 PROCEDURE — 85732 THROMBOPLASTIN TIME PARTIAL: CPT

## 2019-10-13 PROCEDURE — 87086 URINE CULTURE/COLONY COUNT: CPT

## 2019-10-13 PROCEDURE — 88184 FLOWCYTOMETRY/ TC 1 MARKER: CPT

## 2019-10-13 PROCEDURE — 88185 FLOWCYTOMETRY/TC ADD-ON: CPT

## 2019-10-13 PROCEDURE — 87389 HIV-1 AG W/HIV-1&-2 AB AG IA: CPT

## 2019-10-13 PROCEDURE — 81120 IDH1 COMMON VARIANTS: CPT

## 2019-10-13 PROCEDURE — 88360 TUMOR IMMUNOHISTOCHEM/MANUAL: CPT

## 2019-10-13 PROCEDURE — P9037: CPT

## 2019-10-13 PROCEDURE — 81210 BRAF GENE: CPT

## 2019-10-13 PROCEDURE — 71045 X-RAY EXAM CHEST 1 VIEW: CPT

## 2019-10-13 PROCEDURE — 82803 BLOOD GASES ANY COMBINATION: CPT

## 2019-10-13 PROCEDURE — 87581 M.PNEUMON DNA AMP PROBE: CPT

## 2019-10-13 PROCEDURE — 87633 RESP VIRUS 12-25 TARGETS: CPT

## 2019-10-13 PROCEDURE — 93306 TTE W/DOPPLER COMPLETE: CPT

## 2019-10-13 PROCEDURE — 83880 ASSAY OF NATRIURETIC PEPTIDE: CPT

## 2019-10-13 PROCEDURE — 85379 FIBRIN DEGRADATION QUANT: CPT

## 2019-10-13 PROCEDURE — 86923 COMPATIBILITY TEST ELECTRIC: CPT

## 2019-10-13 PROCEDURE — 82955 ASSAY OF G6PD ENZYME: CPT

## 2019-10-13 PROCEDURE — 96365 THER/PROPH/DIAG IV INF INIT: CPT | Mod: XU

## 2019-10-13 PROCEDURE — 85611 PROTHROMBIN TEST: CPT

## 2019-10-13 PROCEDURE — 88264 CHROMOSOME ANALYSIS 20-25: CPT

## 2019-10-13 PROCEDURE — P9040: CPT

## 2019-10-13 PROCEDURE — 81402 MOPATH PROCEDURE LEVEL 3: CPT

## 2019-10-13 PROCEDURE — 88313 SPECIAL STAINS GROUP 2: CPT

## 2019-10-13 PROCEDURE — 74018 RADEX ABDOMEN 1 VIEW: CPT

## 2019-10-13 PROCEDURE — 87486 CHLMYD PNEUM DNA AMP PROBE: CPT

## 2019-10-13 PROCEDURE — 86901 BLOOD TYPING SEROLOGIC RH(D): CPT

## 2019-10-13 PROCEDURE — 81276 KRAS GENE ADDL VARIANTS: CPT

## 2019-10-13 PROCEDURE — 77001 FLUOROGUIDE FOR VEIN DEVICE: CPT

## 2019-10-13 PROCEDURE — 84550 ASSAY OF BLOOD/URIC ACID: CPT

## 2019-10-13 PROCEDURE — 88280 CHROMOSOME KARYOTYPE STUDY: CPT

## 2019-10-13 PROCEDURE — 81121 IDH2 COMMON VARIANTS: CPT

## 2019-10-13 PROCEDURE — 93005 ELECTROCARDIOGRAM TRACING: CPT

## 2019-10-13 PROCEDURE — 96375 TX/PRO/DX INJ NEW DRUG ADDON: CPT | Mod: XU

## 2019-10-13 PROCEDURE — 81219 CALR GENE COM VARIANTS: CPT

## 2019-10-13 PROCEDURE — 81272 KIT GENE TARGETED SEQ ANALYS: CPT

## 2019-10-13 PROCEDURE — 87449 NOS EACH ORGANISM AG IA: CPT

## 2019-10-13 PROCEDURE — 87631 RESP VIRUS 3-5 TARGETS: CPT

## 2019-10-13 PROCEDURE — 36430 TRANSFUSION BLD/BLD COMPNT: CPT

## 2019-10-13 PROCEDURE — C1769: CPT

## 2019-10-13 PROCEDURE — 85610 PROTHROMBIN TIME: CPT

## 2019-10-13 PROCEDURE — 80074 ACUTE HEPATITIS PANEL: CPT

## 2019-10-13 PROCEDURE — 88237 TISSUE CULTURE BONE MARROW: CPT

## 2019-10-13 PROCEDURE — 83010 ASSAY OF HAPTOGLOBIN QUANT: CPT

## 2019-10-13 PROCEDURE — 85730 THROMBOPLASTIN TIME PARTIAL: CPT

## 2019-10-13 PROCEDURE — 97161 PT EVAL LOW COMPLEX 20 MIN: CPT

## 2019-10-13 PROCEDURE — 85049 AUTOMATED PLATELET COUNT: CPT

## 2019-10-13 PROCEDURE — 88341 IMHCHEM/IMCYTCHM EA ADD ANTB: CPT

## 2019-10-13 PROCEDURE — 81270 JAK2 GENE: CPT

## 2019-10-13 PROCEDURE — 86480 TB TEST CELL IMMUN MEASURE: CPT

## 2019-10-13 PROCEDURE — 81246 FLT3 GENE ANALYSIS: CPT

## 2019-10-13 PROCEDURE — 87205 SMEAR GRAM STAIN: CPT

## 2019-10-13 PROCEDURE — 87517 HEPATITIS B DNA QUANT: CPT

## 2019-10-13 PROCEDURE — 81245 FLT3 GENE: CPT

## 2019-10-13 PROCEDURE — 83735 ASSAY OF MAGNESIUM: CPT

## 2019-10-13 PROCEDURE — 81403 MOPATH PROCEDURE LEVEL 4: CPT

## 2019-10-13 PROCEDURE — 80053 COMPREHEN METABOLIC PANEL: CPT

## 2019-10-13 PROCEDURE — 71275 CT ANGIOGRAPHY CHEST: CPT

## 2019-10-13 PROCEDURE — 81170 ABL1 GENE: CPT

## 2019-10-13 PROCEDURE — 88342 IMHCHEM/IMCYTCHM 1ST ANTB: CPT

## 2019-10-13 PROCEDURE — 88271 CYTOGENETICS DNA PROBE: CPT

## 2019-10-13 PROCEDURE — 86850 RBC ANTIBODY SCREEN: CPT

## 2019-10-13 PROCEDURE — 85014 HEMATOCRIT: CPT

## 2019-10-13 PROCEDURE — C1894: CPT

## 2019-10-13 PROCEDURE — 82272 OCCULT BLD FECES 1-3 TESTS: CPT

## 2019-10-13 PROCEDURE — 83930 ASSAY OF BLOOD OSMOLALITY: CPT

## 2019-10-13 PROCEDURE — 82435 ASSAY OF BLOOD CHLORIDE: CPT

## 2019-10-13 PROCEDURE — 83615 LACTATE (LD) (LDH) ENZYME: CPT

## 2019-10-13 PROCEDURE — 74176 CT ABD & PELVIS W/O CONTRAST: CPT

## 2019-10-13 PROCEDURE — 84484 ASSAY OF TROPONIN QUANT: CPT

## 2019-10-13 PROCEDURE — 76942 ECHO GUIDE FOR BIOPSY: CPT

## 2019-10-13 PROCEDURE — 85097 BONE MARROW INTERPRETATION: CPT

## 2019-10-13 PROCEDURE — 82330 ASSAY OF CALCIUM: CPT

## 2019-10-13 PROCEDURE — 88305 TISSUE EXAM BY PATHOLOGIST: CPT

## 2019-10-13 PROCEDURE — 87040 BLOOD CULTURE FOR BACTERIA: CPT

## 2019-10-13 PROCEDURE — 81275 KRAS GENE VARIANTS EXON 2: CPT

## 2019-10-13 PROCEDURE — 88307 TISSUE EXAM BY PATHOLOGIST: CPT

## 2019-10-13 PROCEDURE — 86704 HEP B CORE ANTIBODY TOTAL: CPT

## 2019-10-13 PROCEDURE — 99285 EMERGENCY DEPT VISIT HI MDM: CPT | Mod: 25

## 2019-10-13 PROCEDURE — 81001 URINALYSIS AUTO W/SCOPE: CPT

## 2019-10-13 PROCEDURE — 86900 BLOOD TYPING SEROLOGIC ABO: CPT

## 2019-10-14 ENCOUNTER — RESULT REVIEW (OUTPATIENT)
Age: 84
End: 2019-10-14

## 2019-10-14 ENCOUNTER — APPOINTMENT (OUTPATIENT)
Dept: INFUSION THERAPY | Facility: HOSPITAL | Age: 84
End: 2019-10-14

## 2019-10-14 LAB
BLASTS # FLD: 8 % — HIGH (ref 0–0)
EOSINOPHIL NFR BLD AUTO: 1 % — SIGNIFICANT CHANGE UP (ref 0–6)
HCT VFR BLD CALC: 35.9 % — LOW (ref 39–50)
HGB BLD-MCNC: 10.7 G/DL — LOW (ref 13–17)
LYMPHOCYTES # BLD AUTO: 3 % — LOW (ref 13–44)
LYMPHOCYTES # BLD AUTO: SIGNIFICANT CHANGE UP (ref 1–3.3)
MCHC RBC-ENTMCNC: 28.4 PG — SIGNIFICANT CHANGE UP (ref 27–34)
MCHC RBC-ENTMCNC: 29.9 G/DL — LOW (ref 32–36)
MCV RBC AUTO: 95 FL — SIGNIFICANT CHANGE UP (ref 80–100)
METAMYELOCYTES # FLD: 5 % — HIGH (ref 0–0)
MONOCYTES NFR BLD AUTO: 43 % — HIGH (ref 2–14)
MYELOCYTES NFR BLD: 19 % — HIGH (ref 0–0)
NEUTROPHILS # BLD AUTO: SIGNIFICANT CHANGE UP (ref 1.8–7.4)
NEUTROPHILS NFR BLD AUTO: 18 % — LOW (ref 43–77)
NEUTS BAND # BLD: 1 % — SIGNIFICANT CHANGE UP (ref 0–8)
PLAT MORPH BLD: NORMAL — SIGNIFICANT CHANGE UP
PLATELET # BLD AUTO: 62 K/UL — LOW (ref 150–400)
PROMYELOCYTES # FLD: 2 % — HIGH (ref 0–0)
RBC # BLD: 3.77 M/UL — LOW (ref 4.2–5.8)
RBC # FLD: 18.4 % — HIGH (ref 10.3–14.5)
RBC BLD AUTO: SIGNIFICANT CHANGE UP
WBC # BLD: 102 K/UL — CRITICAL HIGH (ref 3.8–10.5)
WBC # FLD AUTO: 102 K/UL — CRITICAL HIGH (ref 3.8–10.5)

## 2019-10-15 ENCOUNTER — APPOINTMENT (OUTPATIENT)
Dept: HEMATOLOGY ONCOLOGY | Facility: CLINIC | Age: 84
End: 2019-10-15
Payer: COMMERCIAL

## 2019-10-15 VITALS
DIASTOLIC BLOOD PRESSURE: 70 MMHG | RESPIRATION RATE: 16 BRPM | HEIGHT: 65.94 IN | WEIGHT: 127.87 LBS | BODY MASS INDEX: 20.8 KG/M2 | TEMPERATURE: 98.2 F | SYSTOLIC BLOOD PRESSURE: 118 MMHG | HEART RATE: 80 BPM | OXYGEN SATURATION: 98 %

## 2019-10-15 DIAGNOSIS — C77.2 SECONDARY AND UNSPECIFIED MALIGNANT NEOPLASM OF INTRA-ABDOMINAL LYMPH NODES: ICD-10-CM

## 2019-10-15 PROCEDURE — 99215 OFFICE O/P EST HI 40 MIN: CPT

## 2019-10-17 ENCOUNTER — OTHER (OUTPATIENT)
Age: 84
End: 2019-10-17

## 2019-10-18 ENCOUNTER — LABORATORY RESULT (OUTPATIENT)
Age: 84
End: 2019-10-18

## 2019-10-18 ENCOUNTER — RESULT REVIEW (OUTPATIENT)
Age: 84
End: 2019-10-18

## 2019-10-18 ENCOUNTER — APPOINTMENT (OUTPATIENT)
Dept: INFUSION THERAPY | Facility: HOSPITAL | Age: 84
End: 2019-10-18

## 2019-10-18 LAB
ANISOCYTOSIS BLD QL: SLIGHT — SIGNIFICANT CHANGE UP
BASO STIPL BLD QL SMEAR: PRESENT — SIGNIFICANT CHANGE UP
BASOPHILS # BLD AUTO: 0.1 K/UL — SIGNIFICANT CHANGE UP (ref 0–0.2)
BLASTS # FLD: 6 % — HIGH (ref 0–0)
EOSINOPHIL # BLD AUTO: 0.5 K/UL — SIGNIFICANT CHANGE UP (ref 0–0.5)
HCT VFR BLD CALC: 33.7 % — LOW (ref 39–50)
HGB BLD-MCNC: 10.3 G/DL — LOW (ref 13–17)
HYPOCHROMIA BLD QL: SLIGHT — SIGNIFICANT CHANGE UP
LYMPHOCYTES # BLD AUTO: 3 % — LOW (ref 13–44)
LYMPHOCYTES # BLD AUTO: 3 K/UL — SIGNIFICANT CHANGE UP (ref 1–3.3)
MACROCYTES BLD QL: SLIGHT — SIGNIFICANT CHANGE UP
MCHC RBC-ENTMCNC: 28.4 PG — SIGNIFICANT CHANGE UP (ref 27–34)
MCHC RBC-ENTMCNC: 30.5 G/DL — LOW (ref 32–36)
MCV RBC AUTO: 93.1 FL — SIGNIFICANT CHANGE UP (ref 80–100)
METAMYELOCYTES # FLD: 5 % — HIGH (ref 0–0)
MICROCYTES BLD QL: SLIGHT — SIGNIFICANT CHANGE UP
MONOCYTES # BLD AUTO: 11.7 K/UL — HIGH (ref 0–0.9)
MONOCYTES NFR BLD AUTO: 15 % — HIGH (ref 2–14)
NEUTROPHILS # BLD AUTO: 41.7 K/UL — HIGH (ref 1.8–7.4)
NEUTROPHILS NFR BLD AUTO: 71 % — SIGNIFICANT CHANGE UP (ref 43–77)
NRBC # BLD: 1 /100 — HIGH (ref 0–0)
OVALOCYTES BLD QL SMEAR: SLIGHT — SIGNIFICANT CHANGE UP
PLAT MORPH BLD: NORMAL — SIGNIFICANT CHANGE UP
PLATELET # BLD AUTO: 42 K/UL — LOW (ref 150–400)
POIKILOCYTOSIS BLD QL AUTO: SLIGHT — SIGNIFICANT CHANGE UP
POLYCHROMASIA BLD QL SMEAR: SLIGHT — SIGNIFICANT CHANGE UP
RBC # BLD: 3.62 M/UL — LOW (ref 4.2–5.8)
RBC # FLD: 17.2 % — HIGH (ref 10.3–14.5)
RBC BLD AUTO: ABNORMAL
TARGETS BLD QL SMEAR: SLIGHT — SIGNIFICANT CHANGE UP
WBC # BLD: 57 K/UL — CRITICAL HIGH (ref 3.8–10.5)
WBC # FLD AUTO: 57 K/UL — CRITICAL HIGH (ref 3.8–10.5)

## 2019-10-18 PROCEDURE — 99213 OFFICE O/P EST LOW 20 MIN: CPT

## 2019-10-21 ENCOUNTER — LABORATORY RESULT (OUTPATIENT)
Age: 84
End: 2019-10-21

## 2019-10-21 ENCOUNTER — RESULT REVIEW (OUTPATIENT)
Age: 84
End: 2019-10-21

## 2019-10-21 ENCOUNTER — APPOINTMENT (OUTPATIENT)
Dept: INFUSION THERAPY | Facility: HOSPITAL | Age: 84
End: 2019-10-21
Payer: COMMERCIAL

## 2019-10-21 ENCOUNTER — APPOINTMENT (OUTPATIENT)
Dept: HEMATOLOGY ONCOLOGY | Facility: CLINIC | Age: 84
End: 2019-10-21
Payer: COMMERCIAL

## 2019-10-21 VITALS
WEIGHT: 125.64 LBS | BODY MASS INDEX: 20.32 KG/M2 | SYSTOLIC BLOOD PRESSURE: 95 MMHG | RESPIRATION RATE: 14 BRPM | DIASTOLIC BLOOD PRESSURE: 55 MMHG | TEMPERATURE: 97.6 F | OXYGEN SATURATION: 100 % | HEART RATE: 82 BPM

## 2019-10-21 DIAGNOSIS — R11.2 NAUSEA WITH VOMITING, UNSPECIFIED: ICD-10-CM

## 2019-10-21 DIAGNOSIS — C34.12 MALIGNANT NEOPLASM OF UPPER LOBE, LEFT BRONCHUS OR LUNG: ICD-10-CM

## 2019-10-21 DIAGNOSIS — Z51.11 ENCOUNTER FOR ANTINEOPLASTIC CHEMOTHERAPY: ICD-10-CM

## 2019-10-21 DIAGNOSIS — E88.3 TUMOR LYSIS SYNDROME: ICD-10-CM

## 2019-10-21 PROBLEM — C93.10 CHRONIC MYELOMONOCYTIC LEUKEMIA NOT HAVING ACHIEVED REMISSION: Chronic | Status: ACTIVE | Noted: 2019-10-15

## 2019-10-21 LAB
ANISOCYTOSIS BLD QL: SLIGHT — SIGNIFICANT CHANGE UP
BASO STIPL BLD QL SMEAR: PRESENT — SIGNIFICANT CHANGE UP
BASOPHILS # BLD AUTO: 0.2 K/UL — SIGNIFICANT CHANGE UP (ref 0–0.2)
BLASTS # FLD: 1 % — HIGH (ref 0–0)
ELLIPTOCYTES BLD QL SMEAR: SLIGHT — SIGNIFICANT CHANGE UP
EOSINOPHIL # BLD AUTO: 0 K/UL — SIGNIFICANT CHANGE UP (ref 0–0.5)
HCT VFR BLD CALC: 31.6 % — LOW (ref 39–50)
HGB BLD-MCNC: 10.2 G/DL — LOW (ref 13–17)
LYMPHOCYTES # BLD AUTO: 1.3 K/UL — SIGNIFICANT CHANGE UP (ref 1–3.3)
LYMPHOCYTES # BLD AUTO: 7 % — LOW (ref 13–44)
MACROCYTES BLD QL: SLIGHT — SIGNIFICANT CHANGE UP
MCHC RBC-ENTMCNC: 29.6 PG — SIGNIFICANT CHANGE UP (ref 27–34)
MCHC RBC-ENTMCNC: 32.3 G/DL — SIGNIFICANT CHANGE UP (ref 32–36)
MCV RBC AUTO: 91.6 FL — SIGNIFICANT CHANGE UP (ref 80–100)
METAMYELOCYTES # FLD: 5 % — HIGH (ref 0–0)
MICROCYTES BLD QL: SLIGHT — SIGNIFICANT CHANGE UP
MONOCYTES # BLD AUTO: 0.2 K/UL — SIGNIFICANT CHANGE UP (ref 0–0.9)
MONOCYTES NFR BLD AUTO: 11 % — SIGNIFICANT CHANGE UP (ref 2–14)
NEUTROPHILS # BLD AUTO: 6.1 K/UL — SIGNIFICANT CHANGE UP (ref 1.8–7.4)
NEUTROPHILS NFR BLD AUTO: 76 % — SIGNIFICANT CHANGE UP (ref 43–77)
OVALOCYTES BLD QL SMEAR: SLIGHT — SIGNIFICANT CHANGE UP
PLAT MORPH BLD: NORMAL — SIGNIFICANT CHANGE UP
PLATELET # BLD AUTO: 20 K/UL — CRITICAL LOW (ref 150–400)
POIKILOCYTOSIS BLD QL AUTO: SLIGHT — SIGNIFICANT CHANGE UP
RBC # BLD: 3.45 M/UL — LOW (ref 4.2–5.8)
RBC # FLD: 18.3 % — HIGH (ref 10.3–14.5)
RBC BLD AUTO: ABNORMAL
SCHISTOCYTES BLD QL AUTO: SLIGHT — SIGNIFICANT CHANGE UP
TARGETS BLD QL SMEAR: SLIGHT — SIGNIFICANT CHANGE UP
WBC # BLD: 7 K/UL — SIGNIFICANT CHANGE UP (ref 3.8–10.5)
WBC # FLD AUTO: 7 K/UL — SIGNIFICANT CHANGE UP (ref 3.8–10.5)

## 2019-10-21 PROCEDURE — 99215 OFFICE O/P EST HI 40 MIN: CPT | Mod: 25

## 2019-10-21 PROCEDURE — G0008: CPT

## 2019-10-21 RX ORDER — METOCLOPRAMIDE 10 MG/1
10 TABLET ORAL EVERY 6 HOURS
Qty: 40 | Refills: 2 | Status: ACTIVE | COMMUNITY
Start: 2019-10-21 | End: 1900-01-01

## 2019-10-21 RX ORDER — LIDOCAINE AND PRILOCAINE 25; 25 MG/G; MG/G
2.5-2.5 CREAM TOPICAL
Qty: 1 | Refills: 3 | Status: ACTIVE | COMMUNITY
Start: 2019-10-21 | End: 1900-01-01

## 2019-10-21 RX ORDER — ALLOPURINOL 100 MG/1
100 TABLET ORAL
Qty: 30 | Refills: 1 | Status: ACTIVE | COMMUNITY
Start: 2019-10-21 | End: 1900-01-01

## 2019-10-21 RX ORDER — HYDROXYUREA 500 MG/1
500 CAPSULE ORAL
Refills: 0 | Status: DISCONTINUED | COMMUNITY
End: 2019-10-21

## 2019-10-21 NOTE — REASON FOR VISIT
[ Service] : provided by  Service [Spouse] : spouse [Blood Count Assessment] : blood count assessment [Follow-Up Visit] : a follow-up visit for [FreeTextEntry1] : [id number] [FreeTextEntry2] : Luther Morillo [FreeTextEntry3] : Mandarin Chinese [TWNoteComboBox1] : Other

## 2019-10-21 NOTE — RESULTS/DATA
[FreeTextEntry1] : Today's CBC (ON 10/21/19) wbc 7 Hb 10.2 plt 20\par \par On 10/3/19) wbc 2.9 with 59%N, Hb 10.4 plt 29\par On 10/2/19 wbc 3.2 Hb 8.2 plt 34\par On 9/23/19 wbc of 129k/ul with 20% blasts, Hb 7.9 plt 107\par \par RADIOLOGY\par On 9/24/19 CT A/P\par \par IMPRESSION: \par \par Hepatomegaly, with bilobar hypodense lesions, suspicious for metastatic \par disease.\par \par Splenomegaly, with multiple splenic infarcts.\par \par Retroperitoneal lymphadenopathy.\par \par Enlarged prostate gland.\par \par A 4.8 cm infrarenal abdominal aortic aneurysm, status post bifurcated \par aortic stent graft.\par \par On 9/23/19 CT angio\par IMPRESSION: \par \par 1.  No central pulmonary embolus. The subsegmental branches are not \par evaluated secondary to respiratory motion.\par \par 2.  4.3 x 3.4 cm mass posterior left upper lobe. 1.3 cm anterior right \par upper lobe nodule. These are likely neoplastic.\par \par 3.  Multiple hypodense lesions within the liver likely metastatic.\par \par 4.  Splenomegaly. Peripheral hypodensities within the spleen may \par represent infarct or metastatic lesions or combination of both.\par \par \par PATHOLOGY\par On 9/27/19 Liver biopsy\par Addendum\par LIVER, US GUIDED CORE BIOPSY\par POSITIVE FOR MALIGNANT CELLS.  \par Metastatic carcinoma pending further classification\par \par Touch imprint slide and core biopsies shows malignant epithelial cells in syncytial groups and tissue fragments exhibiting pleomorphic nuclei with nuclear clearing and occasional prominent nucleoli in a necrotic background. Occasional mitotic figures are noted. Benign hepatocytes seen.\par Immunohistochemistry is pending, an addendum will follow.\par \par This addendum is being issued to report immunostain results.\par \par Immunohistochemistry:The neoplastic cells are positive for P-40,\par CK7 and negative for CK20, TTF-1, CDX 2, napsin, PSA, PAX 8,\par chromogranin, synaptophysin. JESSICA-3 shows focal faint positive\par staining. KI 67 proliferation index is 90%. This immunostaining\par pattern is seen in tumors of squamous differentiation. Please\par correlate with radiologic findings to determine primary site.\par PDL-1 staining is pending an addendum will be issued.\par \par On 9/23/19 BM bx\par Final Diagnosis\par 1, 2. 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 Bone marrow aspirate is hemodiluted; blast count\par based on IHC stains. As per clinical chart review, patient has\par history of persistent leukocytosis. 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 Clinical correlation and close followup is\par recommended.\par Comprehensive report with results of pending ancillary studies to\par follow.\par

## 2019-10-21 NOTE — HISTORY OF PRESENT ILLNESS
[de-identified] : Mr. Varela was referred to my office after recent admission at [facility] from 9/23/19 to 10/2/19 during which he was diagnosed with CMMoL (Chronic Myelomonocytic Leukemia). According to the wife, the patient has had chronically high wbc count (in the 30's) since 2012, was diagnosed in 2012 with a 'blood disease' for which he has not required any treatment. Prior to admission, the patient has had worsening SOB, chills and fatigue for which he went to ED. Of note, patient has had a long hx smoking and weight loss and while visiting in [facility] he had a lung biopsy in August 2019 showing 'lung cancer'; he flew to [place] (where [place] son lives) in order to get treatment here -the family does no have the medical records or biopsy results. \par \par On admission at [facility], patient had a CT angio on 9/23/19 which r/o'ed PE, but showed a 4.3 x 3.4 cm mass posterior left upper lobe. 1.3 cm anterior right \par upper lobe nodule. These are likely neoplastic. CT A/P on 9/24/19 showed hepatosplenomegaly with splenic infarcts and hepatic masses (likely mets). His blood count on 9/23/19 showed  wbc of 129k/ul with 20% blasts, Hb 7.9 plt 107 and he had acute renal failure. A bone marrow biopsy was done on 9/23/19 showing CMMoL with 10-15% blasts. \par He was treated with Hydroxyurea to lower blood counts -he had 1gram HA q8hrs, blood counts normalized and HA was stopped on 9/30/19. \par He had a biopsy of the liver lesions on 9/27/19 showing squamous cell carcinoma. Post biopsy, patient was supposed to be discharged home -however, he did not respond to PRBC transfusion, and CT A/P showed a small  retroperitoneal bleed. Platelet counts were kept to >=50k/ul with transfusions, CBC's were checked twice daily, repeat CT A/P was done on 10/1/19 and Hb stabilized and repeat CT showed no additional bleeding, he was discharged home on 10/2/19.  [de-identified] : The patient is here to follow up for newly dx'ed CMMoL and metastatic lung CA (SCC). He was found to be PDL1+ --> given Pembrolizumab on 10/18/19 and he had blood work showing an increased LDH/uric acid/Ca c/w tumor lysis syndrome. This is likely post Hydrea -pt restarted on Hydroxyurea 500mg three times daily on 10/14/19 after wbc inc'ed to 100k/ul, pt was confused/lethargic. wbc now normalized, mental status also improved. Today we discussed at this visit with patient, wife and Mandarin Chinese  on video conferencing the chemotherapy for CMMoL -he is scheduled for C1 day 1 Dacogen today.

## 2019-10-21 NOTE — PHYSICAL EXAM
[Normal] : affect appropriate [Ulcers] : no ulcers [Thrush] : no thrush [Mucositis] : no mucositis [de-identified] : weak pulses, chronic stasis hyperpigmentation, min LE edema L>R chronic [de-identified] : R Port -slight hematoma at site, Steri strips [de-identified] : excoriated, red papules, dry skin [de-identified] : hepatomegaly [de-identified] : R inguinal area 2cm LN? [de-identified] : unsteady gait, walks with assistance (1 person)

## 2019-10-21 NOTE — ASSESSMENT
[FreeTextEntry1] : 85 yo Mandarin-speaking M with multiple medical problems (DM, CAD s/p 3 stents in Sept 2018, PVD, CVA 20yrs ago, aortic stents) presenting to [facility] with high wbc, dx'ed with CMMoL (10-15% blasts on BM bx), here to establish care\par Also long time hx smoking, b/l lung masses (largest EMILE) with liver mets, splenic infarcts and HSM -biospy liver lesion c/w SCC --> started Pembro on 10/18/19\par Here for CMMOL f/u\par Also with signs of tumor lysis\par \par -CMMoL -wbc controlled with Hydroxyurea  500mg q8hrs given for the past 1 week. Discussed with pt and wife again today along with Chinese  the risks/benefits/side effects from therapy with Dacogen 20mg/m2 days 1-5 q28 days indefinitely. Emphasized with them risk of infection and increase in requirements for transfusions. Patient and wife understand and agree with treatment -written consent signed today. Pt starting C1 day 1 Dacogen today. Instructed to STOP Hydrea\par \par -CBC with possible plt (transfuse for <20k/ul) 3x/wk Mon/WEd/Fri\par \par -tumor lysis -repeat labs today; start Allopurinol 100mg po daily and give IVF and Elitek 7.5mg x1. If Phos is again high, will start PhosLo. Repeat labs tomorrow as well\par \par -Port may not be functional. CXR showed good placement. If unable to access today, will do dye study\par \par -influenza vaccine to be given today\par \par -cont Viread for prophylaxis -hep B core Ab+\par \par -repeat Quantiferron gold today (was indeterminate); pt asymptomatic\par \par -follow up in 2-3wks

## 2019-10-21 NOTE — REVIEW OF SYSTEMS
[Recent Change In Weight] : ~T recent weight change [Lower Ext Edema] : lower extremity edema [Skin Rash] : skin rash [Easy Bruising] : a tendency for easy bruising [Easy Bleeding] : a tendency for easy bleeding [Negative] : Endocrine [Fever] : no fever [Chills] : no chills [Night Sweats] : no night sweats [Fatigue] : no fatigue [Red Eyes] : eyes not red [Eye Pain] : no eye pain [Proptosis] : no proptosis [Joint Stiffness] : no joint stiffness [Chest Pain] : no chest pain [FreeTextEntry2] : 10 lbs weight loss x2-3 yrs [FreeTextEntry5] : min LE edema [de-identified] : rash in hospital eval'ed by derm -excoriations; improved after cream applied [de-identified] : in wheelchair today

## 2019-10-22 ENCOUNTER — LABORATORY RESULT (OUTPATIENT)
Age: 84
End: 2019-10-22

## 2019-10-22 ENCOUNTER — RESULT REVIEW (OUTPATIENT)
Age: 84
End: 2019-10-22

## 2019-10-22 ENCOUNTER — APPOINTMENT (OUTPATIENT)
Dept: INFUSION THERAPY | Facility: HOSPITAL | Age: 84
End: 2019-10-22

## 2019-10-22 ENCOUNTER — MED ADMIN CHARGE (OUTPATIENT)
Age: 84
End: 2019-10-22

## 2019-10-22 DIAGNOSIS — E86.0 DEHYDRATION: ICD-10-CM

## 2019-10-22 DIAGNOSIS — E83.39 OTHER DISORDERS OF PHOSPHORUS METABOLISM: ICD-10-CM

## 2019-10-22 LAB
BUN SERPL-MCNC: 58 MG/DL — HIGH (ref 7–23)
CA-I BLDA-SCNC: 1.17 MMOL/L — SIGNIFICANT CHANGE UP (ref 1.12–1.3)
CHLORIDE SERPL-SCNC: 103 MMOL/L — SIGNIFICANT CHANGE UP (ref 96–108)
CO2 SERPL-SCNC: 21 MMOL/L — LOW (ref 22–31)
CREAT SERPL-MCNC: 1.6 MG/DL — HIGH (ref 0.5–1.3)
GLUCOSE SERPL-MCNC: 266 MG/DL — HIGH (ref 70–99)
POTASSIUM SERPL-MCNC: 5.9 MMOL/L — HIGH (ref 3.5–5.3)
POTASSIUM SERPL-SCNC: 5.9 MMOL/L — HIGH (ref 3.5–5.3)
SODIUM SERPL-SCNC: 135 MMOL/L — SIGNIFICANT CHANGE UP (ref 135–145)

## 2019-10-22 PROCEDURE — 93010 ELECTROCARDIOGRAM REPORT: CPT

## 2019-10-22 RX ORDER — CALCIUM ACETATE 667 MG
667 TABLET ORAL 3 TIMES DAILY
Qty: 18 | Refills: 1 | Status: ACTIVE | COMMUNITY
Start: 2019-10-22 | End: 1900-01-01

## 2019-10-23 ENCOUNTER — INBOUND DOCUMENT (OUTPATIENT)
Age: 84
End: 2019-10-23

## 2019-10-23 ENCOUNTER — RESULT REVIEW (OUTPATIENT)
Age: 84
End: 2019-10-23

## 2019-10-23 ENCOUNTER — APPOINTMENT (OUTPATIENT)
Dept: INFUSION THERAPY | Facility: HOSPITAL | Age: 84
End: 2019-10-23

## 2019-10-23 LAB
BASOPHILS # BLD AUTO: 0 K/UL — SIGNIFICANT CHANGE UP (ref 0–0.2)
BASOPHILS NFR BLD AUTO: 0.4 % — SIGNIFICANT CHANGE UP (ref 0–2)
EOSINOPHIL # BLD AUTO: 0.1 K/UL — SIGNIFICANT CHANGE UP (ref 0–0.5)
EOSINOPHIL NFR BLD AUTO: 3.1 % — SIGNIFICANT CHANGE UP (ref 0–6)
HCT VFR BLD CALC: 28.1 % — LOW (ref 39–50)
HGB BLD-MCNC: 9.1 G/DL — LOW (ref 13–17)
LYMPHOCYTES # BLD AUTO: 0.7 K/UL — LOW (ref 1–3.3)
LYMPHOCYTES # BLD AUTO: 13.9 % — SIGNIFICANT CHANGE UP (ref 13–44)
MCHC RBC-ENTMCNC: 29.5 PG — SIGNIFICANT CHANGE UP (ref 27–34)
MCHC RBC-ENTMCNC: 32.4 G/DL — SIGNIFICANT CHANGE UP (ref 32–36)
MCV RBC AUTO: 91.1 FL — SIGNIFICANT CHANGE UP (ref 80–100)
MONOCYTES # BLD AUTO: 0.2 K/UL — SIGNIFICANT CHANGE UP (ref 0–0.9)
MONOCYTES NFR BLD AUTO: 4.5 % — SIGNIFICANT CHANGE UP (ref 2–14)
NEUTROPHILS # BLD AUTO: 3.7 K/UL — SIGNIFICANT CHANGE UP (ref 1.8–7.4)
NEUTROPHILS NFR BLD AUTO: 78.1 % — HIGH (ref 43–77)
PLATELET # BLD AUTO: 25 K/UL — LOW (ref 150–400)
RBC # BLD: 3.08 M/UL — LOW (ref 4.2–5.8)
RBC # FLD: 18.1 % — HIGH (ref 10.3–14.5)
WBC # BLD: 4.7 K/UL — SIGNIFICANT CHANGE UP (ref 3.8–10.5)
WBC # FLD AUTO: 4.7 K/UL — SIGNIFICANT CHANGE UP (ref 3.8–10.5)

## 2019-10-24 ENCOUNTER — APPOINTMENT (OUTPATIENT)
Dept: INFUSION THERAPY | Facility: HOSPITAL | Age: 84
End: 2019-10-24

## 2019-10-25 ENCOUNTER — LABORATORY RESULT (OUTPATIENT)
Age: 84
End: 2019-10-25

## 2019-10-25 ENCOUNTER — RESULT REVIEW (OUTPATIENT)
Age: 84
End: 2019-10-25

## 2019-10-25 ENCOUNTER — APPOINTMENT (OUTPATIENT)
Dept: INFUSION THERAPY | Facility: HOSPITAL | Age: 84
End: 2019-10-25

## 2019-10-25 LAB
BUN SERPL-MCNC: 43 MG/DL — HIGH (ref 7–23)
CA-I BLDA-SCNC: 1.25 MMOL/L — SIGNIFICANT CHANGE UP (ref 1.12–1.3)
CHLORIDE SERPL-SCNC: 100 MMOL/L — SIGNIFICANT CHANGE UP (ref 96–108)
CO2 SERPL-SCNC: 22 MMOL/L — SIGNIFICANT CHANGE UP (ref 22–31)
CREAT SERPL-MCNC: 1.6 MG/DL — HIGH (ref 0.5–1.3)
EOSINOPHIL # BLD AUTO: 0 K/UL — SIGNIFICANT CHANGE UP (ref 0–0.5)
EOSINOPHIL NFR BLD AUTO: 1 % — SIGNIFICANT CHANGE UP (ref 0–6)
GLUCOSE SERPL-MCNC: 154 MG/DL — HIGH (ref 70–99)
HCT VFR BLD CALC: 25.5 % — LOW (ref 39–50)
HGB BLD-MCNC: 8 G/DL — LOW (ref 13–17)
LYMPHOCYTES # BLD AUTO: 0.9 K/UL — LOW (ref 1–3.3)
LYMPHOCYTES # BLD AUTO: 12 % — LOW (ref 13–44)
MCHC RBC-ENTMCNC: 28.5 PG — SIGNIFICANT CHANGE UP (ref 27–34)
MCHC RBC-ENTMCNC: 31.4 G/DL — LOW (ref 32–36)
MCV RBC AUTO: 90.8 FL — SIGNIFICANT CHANGE UP (ref 80–100)
MONOCYTES # BLD AUTO: 0.1 K/UL — SIGNIFICANT CHANGE UP (ref 0–0.9)
MONOCYTES NFR BLD AUTO: 5 % — SIGNIFICANT CHANGE UP (ref 2–14)
NEUTROPHILS # BLD AUTO: 3.1 K/UL — SIGNIFICANT CHANGE UP (ref 1.8–7.4)
NEUTROPHILS NFR BLD AUTO: 82 % — HIGH (ref 43–77)
PLAT MORPH BLD: NORMAL — SIGNIFICANT CHANGE UP
PLATELET # BLD AUTO: 21 K/UL — LOW (ref 150–400)
POTASSIUM SERPL-MCNC: 4.8 MMOL/L — SIGNIFICANT CHANGE UP (ref 3.5–5.3)
POTASSIUM SERPL-SCNC: 4.8 MMOL/L — SIGNIFICANT CHANGE UP (ref 3.5–5.3)
RBC # BLD: 2.81 M/UL — LOW (ref 4.2–5.8)
RBC # FLD: 18.2 % — HIGH (ref 10.3–14.5)
RBC BLD AUTO: SIGNIFICANT CHANGE UP
SODIUM SERPL-SCNC: 134 MMOL/L — LOW (ref 135–145)
WBC # BLD: 4 K/UL — SIGNIFICANT CHANGE UP (ref 3.8–10.5)
WBC # FLD AUTO: 4 K/UL — SIGNIFICANT CHANGE UP (ref 3.8–10.5)

## 2019-10-28 ENCOUNTER — APPOINTMENT (OUTPATIENT)
Dept: INFUSION THERAPY | Facility: HOSPITAL | Age: 84
End: 2019-10-28

## 2019-10-28 ENCOUNTER — RESULT REVIEW (OUTPATIENT)
Age: 84
End: 2019-10-28

## 2019-10-28 LAB
ACANTHOCYTES BLD QL SMEAR: SIGNIFICANT CHANGE UP
ANISOCYTOSIS BLD QL: SLIGHT — SIGNIFICANT CHANGE UP
BASO STIPL BLD QL SMEAR: PRESENT — SIGNIFICANT CHANGE UP
BASOPHILS # BLD AUTO: 0 K/UL — SIGNIFICANT CHANGE UP (ref 0–0.2)
BLD GP AB SCN SERPL QL: NEGATIVE — SIGNIFICANT CHANGE UP
BURR CELLS BLD QL SMEAR: PRESENT — SIGNIFICANT CHANGE UP
ELLIPTOCYTES BLD QL SMEAR: SLIGHT — SIGNIFICANT CHANGE UP
EOSINOPHIL # BLD AUTO: 0.2 K/UL — SIGNIFICANT CHANGE UP (ref 0–0.5)
EOSINOPHIL NFR BLD AUTO: 4 % — SIGNIFICANT CHANGE UP (ref 0–6)
HCT VFR BLD CALC: 21.6 % — LOW (ref 39–50)
HGB BLD-MCNC: 7.2 G/DL — LOW (ref 13–17)
HYPOCHROMIA BLD QL: SLIGHT — SIGNIFICANT CHANGE UP
LYMPHOCYTES # BLD AUTO: 0.6 K/UL — LOW (ref 1–3.3)
LYMPHOCYTES # BLD AUTO: 15 % — SIGNIFICANT CHANGE UP (ref 13–44)
MACROCYTES BLD QL: SLIGHT — SIGNIFICANT CHANGE UP
MCHC RBC-ENTMCNC: 30.1 PG — SIGNIFICANT CHANGE UP (ref 27–34)
MCHC RBC-ENTMCNC: 33.5 G/DL — SIGNIFICANT CHANGE UP (ref 32–36)
MCV RBC AUTO: 89.8 FL — SIGNIFICANT CHANGE UP (ref 80–100)
MICROCYTES BLD QL: SLIGHT — SIGNIFICANT CHANGE UP
MONOCYTES # BLD AUTO: 0.1 K/UL — SIGNIFICANT CHANGE UP (ref 0–0.9)
MONOCYTES NFR BLD AUTO: 5 % — SIGNIFICANT CHANGE UP (ref 2–14)
NEUTROPHILS # BLD AUTO: 1.2 K/UL — LOW (ref 1.8–7.4)
NEUTROPHILS NFR BLD AUTO: 76 % — SIGNIFICANT CHANGE UP (ref 43–77)
NRBC # BLD: 2 /100 — HIGH (ref 0–0)
OVALOCYTES BLD QL SMEAR: SLIGHT — SIGNIFICANT CHANGE UP
PLAT MORPH BLD: NORMAL — SIGNIFICANT CHANGE UP
PLATELET # BLD AUTO: 20 K/UL — CRITICAL LOW (ref 150–400)
POIKILOCYTOSIS BLD QL AUTO: SLIGHT — SIGNIFICANT CHANGE UP
POLYCHROMASIA BLD QL SMEAR: SLIGHT — SIGNIFICANT CHANGE UP
RBC # BLD: 2.41 M/UL — LOW (ref 4.2–5.8)
RBC # FLD: 18.1 % — HIGH (ref 10.3–14.5)
RBC BLD AUTO: ABNORMAL
RH IG SCN BLD-IMP: POSITIVE — SIGNIFICANT CHANGE UP
WBC # BLD: 2.2 K/UL — LOW (ref 3.8–10.5)
WBC # FLD AUTO: 2.2 K/UL — LOW (ref 3.8–10.5)

## 2019-10-30 ENCOUNTER — RESULT REVIEW (OUTPATIENT)
Age: 84
End: 2019-10-30

## 2019-10-30 ENCOUNTER — APPOINTMENT (OUTPATIENT)
Dept: INFUSION THERAPY | Facility: HOSPITAL | Age: 84
End: 2019-10-30

## 2019-10-30 LAB
EOSINOPHIL # BLD AUTO: 0.2 K/UL — SIGNIFICANT CHANGE UP (ref 0–0.5)
EOSINOPHIL NFR BLD AUTO: 6 % — SIGNIFICANT CHANGE UP (ref 0–6)
HCT VFR BLD CALC: 20.8 % — CRITICAL LOW (ref 39–50)
HGB BLD-MCNC: 6.8 G/DL — CRITICAL LOW (ref 13–17)
LYMPHOCYTES # BLD AUTO: 0.5 K/UL — LOW (ref 1–3.3)
LYMPHOCYTES # BLD AUTO: 26 % — SIGNIFICANT CHANGE UP (ref 13–44)
MCHC RBC-ENTMCNC: 29.7 PG — SIGNIFICANT CHANGE UP (ref 27–34)
MCHC RBC-ENTMCNC: 32.6 G/DL — SIGNIFICANT CHANGE UP (ref 32–36)
MCV RBC AUTO: 91.1 FL — SIGNIFICANT CHANGE UP (ref 80–100)
MONOCYTES # BLD AUTO: 0.1 K/UL — SIGNIFICANT CHANGE UP (ref 0–0.9)
MONOCYTES NFR BLD AUTO: 5 % — SIGNIFICANT CHANGE UP (ref 2–14)
NEUTROPHILS # BLD AUTO: 0.8 K/UL — LOW (ref 1.8–7.4)
NEUTROPHILS NFR BLD AUTO: 63 % — SIGNIFICANT CHANGE UP (ref 43–77)
PLAT MORPH BLD: NORMAL — SIGNIFICANT CHANGE UP
PLATELET # BLD AUTO: 15 K/UL — CRITICAL LOW (ref 150–400)
PLATELET # BLD MANUAL: 37 K/UL — LOW (ref 150–400)
RBC # BLD: 2.28 M/UL — LOW (ref 4.2–5.8)
RBC # FLD: 17.5 % — HIGH (ref 10.3–14.5)
RBC BLD AUTO: SIGNIFICANT CHANGE UP
WBC # BLD: 1.6 K/UL — LOW (ref 3.8–10.5)
WBC # FLD AUTO: 1.6 K/UL — LOW (ref 3.8–10.5)

## 2019-10-31 ENCOUNTER — APPOINTMENT (OUTPATIENT)
Dept: INFUSION THERAPY | Facility: HOSPITAL | Age: 84
End: 2019-10-31

## 2019-10-31 ENCOUNTER — RESULT REVIEW (OUTPATIENT)
Age: 84
End: 2019-10-31

## 2019-10-31 LAB
BLD GP AB SCN SERPL QL: NEGATIVE — SIGNIFICANT CHANGE UP
RH IG SCN BLD-IMP: POSITIVE — SIGNIFICANT CHANGE UP

## 2019-10-31 PROCEDURE — 86900 BLOOD TYPING SEROLOGIC ABO: CPT

## 2019-10-31 PROCEDURE — 86850 RBC ANTIBODY SCREEN: CPT

## 2019-10-31 PROCEDURE — 86923 COMPATIBILITY TEST ELECTRIC: CPT

## 2019-10-31 PROCEDURE — 86901 BLOOD TYPING SEROLOGIC RH(D): CPT

## 2019-11-01 ENCOUNTER — APPOINTMENT (OUTPATIENT)
Dept: INFUSION THERAPY | Facility: HOSPITAL | Age: 84
End: 2019-11-01

## 2019-11-01 ENCOUNTER — RESULT REVIEW (OUTPATIENT)
Age: 84
End: 2019-11-01

## 2019-11-01 ENCOUNTER — OUTPATIENT (OUTPATIENT)
Dept: OUTPATIENT SERVICES | Facility: HOSPITAL | Age: 84
LOS: 1 days | Discharge: HOME | End: 2019-11-01

## 2019-11-01 ENCOUNTER — APPOINTMENT (OUTPATIENT)
Dept: HEMATOLOGY ONCOLOGY | Facility: CLINIC | Age: 84
End: 2019-11-01
Payer: COMMERCIAL

## 2019-11-01 DIAGNOSIS — C78.00 SECONDARY MALIGNANT NEOPLASM OF UNSPECIFIED LUNG: ICD-10-CM

## 2019-11-01 DIAGNOSIS — C77.1 SECONDARY AND UNSPECIFIED MALIGNANT NEOPLASM OF INTRATHORACIC LYMPH NODES: ICD-10-CM

## 2019-11-01 DIAGNOSIS — C77.0 SECONDARY AND UNSPECIFIED MALIGNANT NEOPLASM OF LYMPH NODES OF HEAD, FACE AND NECK: ICD-10-CM

## 2019-11-01 DIAGNOSIS — C34.12 MALIGNANT NEOPLASM OF UPPER LOBE, LEFT BRONCHUS OR LUNG: ICD-10-CM

## 2019-11-01 DIAGNOSIS — C93.10 CHRONIC MYELOMONOCYTIC LEUKEMIA NOT HAVING ACHIEVED REMISSION: ICD-10-CM

## 2019-11-01 DIAGNOSIS — C78.7 SECONDARY MALIGNANT NEOPLASM OF LIVER AND INTRAHEPATIC BILE DUCT: ICD-10-CM

## 2019-11-01 LAB
ANISOCYTOSIS BLD QL: SLIGHT — SIGNIFICANT CHANGE UP
BASO STIPL BLD QL SMEAR: PRESENT — SIGNIFICANT CHANGE UP
BASOPHILS NFR BLD AUTO: 11 % — HIGH (ref 0–2)
BURR CELLS BLD QL SMEAR: PRESENT — SIGNIFICANT CHANGE UP
ELLIPTOCYTES BLD QL SMEAR: SLIGHT — SIGNIFICANT CHANGE UP
EOSINOPHIL NFR BLD AUTO: 7 % — HIGH (ref 0–6)
HCT VFR BLD CALC: 21.7 % — LOW (ref 39–50)
HGB BLD-MCNC: 7.5 G/DL — LOW (ref 13–17)
HYPOCHROMIA BLD QL: SLIGHT — SIGNIFICANT CHANGE UP
LYMPHOCYTES # BLD AUTO: 21 % — SIGNIFICANT CHANGE UP (ref 13–44)
LYMPHOCYTES # BLD AUTO: SIGNIFICANT CHANGE UP (ref 1–3.3)
MACROCYTES BLD QL: SLIGHT — SIGNIFICANT CHANGE UP
MCHC RBC-ENTMCNC: 30.8 PG — SIGNIFICANT CHANGE UP (ref 27–34)
MCHC RBC-ENTMCNC: 34.6 G/DL — SIGNIFICANT CHANGE UP (ref 32–36)
MCV RBC AUTO: 89 FL — SIGNIFICANT CHANGE UP (ref 80–100)
MICROCYTES BLD QL: SLIGHT — SIGNIFICANT CHANGE UP
MONOCYTES NFR BLD AUTO: 10 % — SIGNIFICANT CHANGE UP (ref 2–14)
NEUTROPHILS # BLD AUTO: 0.9 K/UL — LOW (ref 1.8–7.4)
NEUTROPHILS NFR BLD AUTO: 51 % — SIGNIFICANT CHANGE UP (ref 43–77)
NRBC # BLD: 2 /100 — HIGH (ref 0–0)
PLAT MORPH BLD: NORMAL — SIGNIFICANT CHANGE UP
PLATELET # BLD AUTO: 27 K/UL — LOW (ref 150–400)
POIKILOCYTOSIS BLD QL AUTO: SLIGHT — SIGNIFICANT CHANGE UP
POLYCHROMASIA BLD QL SMEAR: SLIGHT — SIGNIFICANT CHANGE UP
RBC # BLD: 2.44 M/UL — LOW (ref 4.2–5.8)
RBC # FLD: 17.1 % — HIGH (ref 10.3–14.5)
RBC BLD AUTO: ABNORMAL
SCHISTOCYTES BLD QL AUTO: SLIGHT — SIGNIFICANT CHANGE UP
WBC # BLD: 1.6 K/UL — LOW (ref 3.8–10.5)
WBC # FLD AUTO: 1.6 K/UL — LOW (ref 3.8–10.5)

## 2019-11-01 PROCEDURE — 99214 OFFICE O/P EST MOD 30 MIN: CPT

## 2019-11-04 ENCOUNTER — RESULT REVIEW (OUTPATIENT)
Age: 84
End: 2019-11-04

## 2019-11-04 ENCOUNTER — LABORATORY RESULT (OUTPATIENT)
Age: 84
End: 2019-11-04

## 2019-11-04 ENCOUNTER — APPOINTMENT (OUTPATIENT)
Dept: INFUSION THERAPY | Facility: HOSPITAL | Age: 84
End: 2019-11-04
Payer: COMMERCIAL

## 2019-11-04 DIAGNOSIS — Z51.89 ENCOUNTER FOR OTHER SPECIFIED AFTERCARE: ICD-10-CM

## 2019-11-04 LAB
ANISOCYTOSIS BLD QL: SLIGHT — SIGNIFICANT CHANGE UP
BASOPHILS NFR BLD AUTO: 4 % — HIGH (ref 0–2)
BLD GP AB SCN SERPL QL: NEGATIVE — SIGNIFICANT CHANGE UP
DACRYOCYTES BLD QL SMEAR: SLIGHT — SIGNIFICANT CHANGE UP
DATE OF TRANSF RX: SIGNIFICANT CHANGE UP
ELLIPTOCYTES BLD QL SMEAR: SLIGHT — SIGNIFICANT CHANGE UP
EOSINOPHIL NFR BLD AUTO: 22 % — HIGH (ref 0–6)
HCT VFR BLD CALC: 21.4 % — LOW (ref 39–50)
HGB BLD-MCNC: 7.5 G/DL — LOW (ref 13–17)
HYPOCHROMIA BLD QL: SLIGHT — SIGNIFICANT CHANGE UP
LYMPHOCYTES # BLD AUTO: 30 % — SIGNIFICANT CHANGE UP (ref 13–44)
LYMPHOCYTES # BLD AUTO: SIGNIFICANT CHANGE UP (ref 1–3.3)
MACROCYTES BLD QL: SLIGHT — SIGNIFICANT CHANGE UP
MCHC RBC-ENTMCNC: 31.3 PG — SIGNIFICANT CHANGE UP (ref 27–34)
MCHC RBC-ENTMCNC: 34.9 G/DL — SIGNIFICANT CHANGE UP (ref 32–36)
MCV RBC AUTO: 89.7 FL — SIGNIFICANT CHANGE UP (ref 80–100)
MICROCYTES BLD QL: SLIGHT — SIGNIFICANT CHANGE UP
MONOCYTES NFR BLD AUTO: 10 % — SIGNIFICANT CHANGE UP (ref 2–14)
NEUTROPHILS # BLD AUTO: 0.4 K/UL — LOW (ref 1.8–7.4)
NEUTROPHILS NFR BLD AUTO: 34 % — LOW (ref 43–77)
PLAT MORPH BLD: NORMAL — SIGNIFICANT CHANGE UP
PLATELET # BLD AUTO: 11 K/UL — CRITICAL LOW (ref 150–400)
POIKILOCYTOSIS BLD QL AUTO: SLIGHT — SIGNIFICANT CHANGE UP
POLYCHROMASIA BLD QL SMEAR: SLIGHT — SIGNIFICANT CHANGE UP
RBC # BLD: 2.39 M/UL — LOW (ref 4.2–5.8)
RBC # FLD: 18 % — HIGH (ref 10.3–14.5)
RBC BLD AUTO: ABNORMAL
RH IG SCN BLD-IMP: POSITIVE — SIGNIFICANT CHANGE UP
SCHISTOCYTES BLD QL AUTO: SLIGHT — SIGNIFICANT CHANGE UP
WBC # BLD: 1.3 K/UL — LOW (ref 3.8–10.5)
WBC # FLD AUTO: 1.3 K/UL — LOW (ref 3.8–10.5)

## 2019-11-04 PROCEDURE — 86078 PHYS BLOOD BANK SERV REACTJ: CPT

## 2019-11-04 PROCEDURE — 99213 OFFICE O/P EST LOW 20 MIN: CPT

## 2019-11-05 ENCOUNTER — APPOINTMENT (OUTPATIENT)
Dept: INFUSION THERAPY | Facility: HOSPITAL | Age: 84
End: 2019-11-05

## 2019-11-05 ENCOUNTER — APPOINTMENT (OUTPATIENT)
Dept: HEMATOLOGY ONCOLOGY | Facility: CLINIC | Age: 84
End: 2019-11-05
Payer: COMMERCIAL

## 2019-11-05 ENCOUNTER — OUTPATIENT (OUTPATIENT)
Dept: OUTPATIENT SERVICES | Facility: HOSPITAL | Age: 84
LOS: 1 days | End: 2019-11-05
Payer: COMMERCIAL

## 2019-11-05 ENCOUNTER — LABORATORY RESULT (OUTPATIENT)
Age: 84
End: 2019-11-05

## 2019-11-05 ENCOUNTER — RESULT REVIEW (OUTPATIENT)
Age: 84
End: 2019-11-05

## 2019-11-05 VITALS
OXYGEN SATURATION: 100 % | RESPIRATION RATE: 16 BRPM | TEMPERATURE: 97.5 F | HEART RATE: 80 BPM | SYSTOLIC BLOOD PRESSURE: 89 MMHG | DIASTOLIC BLOOD PRESSURE: 45 MMHG | BODY MASS INDEX: 21.21 KG/M2 | WEIGHT: 131.15 LBS

## 2019-11-05 DIAGNOSIS — C93.10 CHRONIC MYELOMONOCYTIC LEUKEMIA NOT HAVING ACHIEVED REMISSION: ICD-10-CM

## 2019-11-05 DIAGNOSIS — D61.810 ANTINEOPLASTIC CHEMOTHERAPY INDUCED PANCYTOPENIA: ICD-10-CM

## 2019-11-05 DIAGNOSIS — T45.1X5A ANTINEOPLASTIC CHEMOTHERAPY INDUCED PANCYTOPENIA: ICD-10-CM

## 2019-11-05 LAB
ANISOCYTOSIS BLD QL: SLIGHT — SIGNIFICANT CHANGE UP
BASOPHILS # BLD AUTO: 0 K/UL — SIGNIFICANT CHANGE UP (ref 0–0.2)
BASOPHILS NFR BLD AUTO: 2 % — SIGNIFICANT CHANGE UP (ref 0–2)
DACRYOCYTES BLD QL SMEAR: SLIGHT — SIGNIFICANT CHANGE UP
ELLIPTOCYTES BLD QL SMEAR: SLIGHT — SIGNIFICANT CHANGE UP
EOSINOPHIL # BLD AUTO: 0.1 K/UL — SIGNIFICANT CHANGE UP (ref 0–0.5)
EOSINOPHIL NFR BLD AUTO: 19 % — HIGH (ref 0–6)
HCT VFR BLD CALC: 20.9 % — CRITICAL LOW (ref 39–50)
HGB BLD-MCNC: 7.2 G/DL — LOW (ref 13–17)
HYPOCHROMIA BLD QL: SLIGHT — SIGNIFICANT CHANGE UP
LYMPHOCYTES # BLD AUTO: 0.3 K/UL — LOW (ref 1–3.3)
LYMPHOCYTES # BLD AUTO: 46 % — HIGH (ref 13–44)
MACROCYTES BLD QL: SLIGHT — SIGNIFICANT CHANGE UP
MCHC RBC-ENTMCNC: 31.2 PG — SIGNIFICANT CHANGE UP (ref 27–34)
MCHC RBC-ENTMCNC: 34.3 G/DL — SIGNIFICANT CHANGE UP (ref 32–36)
MCV RBC AUTO: 91 FL — SIGNIFICANT CHANGE UP (ref 80–100)
MICROCYTES BLD QL: SLIGHT — SIGNIFICANT CHANGE UP
MONOCYTES # BLD AUTO: 0.1 K/UL — SIGNIFICANT CHANGE UP (ref 0–0.9)
MONOCYTES NFR BLD AUTO: 8 % — SIGNIFICANT CHANGE UP (ref 2–14)
NEUTROPHILS # BLD AUTO: 0.1 K/UL — LOW (ref 1.8–7.4)
NEUTROPHILS NFR BLD AUTO: 25 % — LOW (ref 43–77)
NRBC # BLD: 1 /100 — HIGH (ref 0–0)
PLAT MORPH BLD: NORMAL — SIGNIFICANT CHANGE UP
PLATELET # BLD AUTO: 3 K/UL — CRITICAL LOW (ref 150–400)
PLATELET # BLD MANUAL: 14 K/UL — CRITICAL LOW (ref 150–400)
POIKILOCYTOSIS BLD QL AUTO: SLIGHT — SIGNIFICANT CHANGE UP
POLYCHROMASIA BLD QL SMEAR: SLIGHT — SIGNIFICANT CHANGE UP
RBC # BLD: 2.3 M/UL — LOW (ref 4.2–5.8)
RBC # FLD: 17.1 % — HIGH (ref 10.3–14.5)
RBC BLD AUTO: ABNORMAL
SCHISTOCYTES BLD QL AUTO: SLIGHT — SIGNIFICANT CHANGE UP
WBC # BLD: 0.6 K/UL — CRITICAL LOW (ref 3.8–10.5)
WBC # FLD AUTO: 0.6 K/UL — CRITICAL LOW (ref 3.8–10.5)

## 2019-11-05 PROCEDURE — 99215 OFFICE O/P EST HI 40 MIN: CPT

## 2019-11-05 RX ORDER — LEVOFLOXACIN 250 MG/1
250 TABLET, FILM COATED ORAL DAILY
Qty: 30 | Refills: 0 | Status: DISCONTINUED | COMMUNITY
Start: 2019-11-04 | End: 2019-11-05

## 2019-11-05 RX ORDER — FLUCONAZOLE 200 MG/1
200 TABLET ORAL DAILY
Qty: 30 | Refills: 3 | Status: ACTIVE | COMMUNITY
Start: 2019-11-05 | End: 1900-01-01

## 2019-11-05 RX ORDER — TENOFOVIR DISOPROXIL FUMARATE 300 MG/1
300 TABLET ORAL DAILY
Qty: 30 | Refills: 3 | Status: ACTIVE | COMMUNITY
Start: 2019-10-03 | End: 1900-01-01

## 2019-11-05 NOTE — PHYSICAL EXAM
[Ambulatory and capable of all self care but unable to carry out any work activities] : Status 2- Ambulatory and capable of all self care but unable to carry out any work activities. Up and about more than 50% of waking hours [Thin] : thin [Normal] : PERRL, EOMI, no conjunctival infection, anicteric [Ulcers] : no ulcers [Mucositis] : no mucositis [Thrush] : no thrush [de-identified] : R Port -slight bleeding, resolved after wiping it; pressure dressing placed [de-identified] : hepatomegaly [de-identified] : bruising on arms

## 2019-11-05 NOTE — RESULTS/DATA
[FreeTextEntry1] : ON 11/4/19 wbc 1.3  Hb 7.5 plt 11\par \par ON 10/21/19) wbc 7 Hb 10.2 plt 20\par On 10/3/19) wbc 2.9 with 59%N, Hb 10.4 plt 29\par On 10/2/19 wbc 3.2 Hb 8.2 plt 34\par On 9/23/19 wbc of 129k/ul with 20% blasts, Hb 7.9 plt 107\par \par RADIOLOGY\par On 9/24/19 CT A/P\par \par IMPRESSION: \par \par Hepatomegaly, with bilobar hypodense lesions, suspicious for metastatic \par disease.\par \par Splenomegaly, with multiple splenic infarcts.\par \par Retroperitoneal lymphadenopathy.\par \par Enlarged prostate gland.\par \par A 4.8 cm infrarenal abdominal aortic aneurysm, status post bifurcated \par aortic stent graft.\par \par On 9/23/19 CT angio\par IMPRESSION: \par \par 1.  No central pulmonary embolus. The subsegmental branches are not \par evaluated secondary to respiratory motion.\par \par 2.  4.3 x 3.4 cm mass posterior left upper lobe. 1.3 cm anterior right \par upper lobe nodule. These are likely neoplastic.\par \par 3.  Multiple hypodense lesions within the liver likely metastatic.\par \par 4.  Splenomegaly. Peripheral hypodensities within the spleen may \par represent infarct or metastatic lesions or combination of both.\par \par \par PATHOLOGY\par On 9/27/19 Liver biopsy\par Addendum\par LIVER, US GUIDED CORE BIOPSY\par POSITIVE FOR MALIGNANT CELLS.  \par Metastatic carcinoma pending further classification\par \par Touch imprint slide and core biopsies shows malignant epithelial cells in syncytial groups and tissue fragments exhibiting pleomorphic nuclei with nuclear clearing and occasional prominent nucleoli in a necrotic background. Occasional mitotic figures are noted. Benign hepatocytes seen.\par Immunohistochemistry is pending, an addendum will follow.\par \par This addendum is being issued to report immunostain results.\par \par Immunohistochemistry:The neoplastic cells are positive for P-40,\par CK7 and negative for CK20, TTF-1, CDX 2, napsin, PSA, PAX 8,\par chromogranin, synaptophysin. JESSICA-3 shows focal faint positive\par staining. KI 67 proliferation index is 90%. This immunostaining\par pattern is seen in tumors of squamous differentiation. Please\par correlate with radiologic findings to determine primary site.\par PDL-1 staining is pending an addendum will be issued.\par \par On 9/23/19 BM bx\par Final Diagnosis\par 1, 2. 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 Bone marrow aspirate is hemodiluted; blast count\par based on IHC stains. As per clinical chart review, patient has\par history of persistent leukocytosis. 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 Clinical correlation and close followup is\par recommended.\par Comprehensive report with results of pending ancillary studies to\par follow.\par

## 2019-11-05 NOTE — REASON FOR VISIT
[Follow-Up Visit] : a follow-up visit for [Blood Count Assessment] : blood count assessment [Spouse] : spouse [ Service] : provided by  Service [FreeTextEntry1] : [id number] [FreeTextEntry2] : Luther Morillo [FreeTextEntry3] : Mandarin Chinese [TWNoteComboBox1] : Other

## 2019-11-05 NOTE — REVIEW OF SYSTEMS
[Recent Change In Weight] : ~T recent weight change [Lower Ext Edema] : lower extremity edema [Skin Rash] : skin rash [Easy Bleeding] : a tendency for easy bleeding [Easy Bruising] : a tendency for easy bruising [Negative] : Allergic/Immunologic [Fever] : no fever [Chills] : no chills [Night Sweats] : no night sweats [Fatigue] : no fatigue [Eye Pain] : no eye pain [Red Eyes] : eyes not red [Chest Pain] : no chest pain [Joint Stiffness] : no joint stiffness [Proptosis] : no proptosis [FreeTextEntry2] : 10 lbs weight loss x2-3 yrs [FreeTextEntry5] : min LE edema [de-identified] : rash in hospital eval'ed by derm -excoriations; improved after cream applied [de-identified] : in wheelchair today

## 2019-11-05 NOTE — HISTORY OF PRESENT ILLNESS
[de-identified] : Mr. Varela was referred to my office after recent admission at [facility] from 9/23/19 to 10/2/19 during which he was diagnosed with CMMoL (Chronic Myelomonocytic Leukemia). According to the wife, the patient has had chronically high wbc count (in the 30's) since 2012, was diagnosed in 2012 with a 'blood disease' for which he has not required any treatment. Prior to admission, the patient has had worsening SOB, chills and fatigue for which he went to ED. Of note, patient has had a long hx smoking and weight loss and while visiting in [facility] he had a lung biopsy in August 2019 showing 'lung cancer'; he flew to [place] (where his [place] lives) in order to get treatment here -the family does no have the medical records or biopsy results. \par \par On admission at [facility], patient had a CT angio on 9/23/19 which r/o'ed PE, but showed a 4.3 x 3.4 cm mass posterior left upper lobe. 1.3 cm anterior right \par upper lobe nodule. These are likely neoplastic. CT A/P on 9/24/19 showed hepatosplenomegaly with splenic infarcts and hepatic masses (likely mets). His blood count on 9/23/19 showed  wbc of 129k/ul with 20% blasts, Hb 7.9 plt 107 and he had acute renal failure. A bone marrow biopsy was done on 9/23/19 showing CMMoL with 10-15% blasts. \par He was treated with Hydroxyurea to lower blood counts -he had 1gram HA q8hrs, blood counts normalized and HA was stopped on 9/30/19. \par He had a biopsy of the liver lesions on 9/27/19 showing squamous cell carcinoma. Post biopsy, patient was supposed to be discharged home -however, he did not respond to PRBC transfusion, and CT A/P showed a small  retroperitoneal bleed. Platelet counts were kept to >=50k/ul with transfusions, CBC's were checked twice daily, repeat CT A/P was done on 10/1/19 and Hb stabilized and repeat CT showed no additional bleeding, he was discharged home on 10/2/19.  [de-identified] : The patient is here to follow up for newly dx'ed CMMoL and metastatic lung CA (SCC). He was found to be PDL1+ --> given Pembrolizumab on 10/18/19.\par He started Dacogen x 5 days C1 day 1 on 10/21/19 for CMMoL. He tolerated it well, no complaints. Yesterday he had a platelet transfusion reaction -chills, no fevers. He was ok at home. He had cultures drawn -not back yet. He has a bit of bleeding at Port site today. No headaches, no nose bleeding, no fevers. He felt a bit dizzy today.

## 2019-11-06 ENCOUNTER — RESULT REVIEW (OUTPATIENT)
Age: 84
End: 2019-11-06

## 2019-11-06 ENCOUNTER — APPOINTMENT (OUTPATIENT)
Dept: INFUSION THERAPY | Facility: HOSPITAL | Age: 84
End: 2019-11-06

## 2019-11-06 DIAGNOSIS — E86.0 DEHYDRATION: ICD-10-CM

## 2019-11-06 LAB
BASOPHILS NFR BLD AUTO: 3 % — HIGH (ref 0–2)
EOSINOPHIL NFR BLD AUTO: 8 % — HIGH (ref 0–6)
HCT VFR BLD CALC: 21 % — CRITICAL LOW (ref 39–50)
HGB BLD-MCNC: 7.2 G/DL — LOW (ref 13–17)
LYMPHOCYTES # BLD AUTO: 52 % — HIGH (ref 13–44)
LYMPHOCYTES # BLD AUTO: SIGNIFICANT CHANGE UP (ref 1–3.3)
MCHC RBC-ENTMCNC: 31.1 PG — SIGNIFICANT CHANGE UP (ref 27–34)
MCHC RBC-ENTMCNC: 34.1 G/DL — SIGNIFICANT CHANGE UP (ref 32–36)
MCV RBC AUTO: 91.3 FL — SIGNIFICANT CHANGE UP (ref 80–100)
MONOCYTES NFR BLD AUTO: 12 % — SIGNIFICANT CHANGE UP (ref 2–14)
NEUTROPHILS # BLD AUTO: SIGNIFICANT CHANGE UP (ref 1.8–7.4)
NEUTROPHILS NFR BLD AUTO: 25 % — LOW (ref 43–77)
PLAT MORPH BLD: NORMAL — SIGNIFICANT CHANGE UP
PLATELET # BLD AUTO: 13 K/UL — CRITICAL LOW (ref 150–400)
RBC # BLD: 2.3 M/UL — LOW (ref 4.2–5.8)
RBC # FLD: 17.1 % — HIGH (ref 10.3–14.5)
RBC BLD AUTO: SIGNIFICANT CHANGE UP
WBC # BLD: 1 K/UL — CRITICAL LOW (ref 3.8–10.5)
WBC # FLD AUTO: 1 K/UL — CRITICAL LOW (ref 3.8–10.5)

## 2019-11-07 ENCOUNTER — RESULT REVIEW (OUTPATIENT)
Age: 84
End: 2019-11-07

## 2019-11-07 ENCOUNTER — APPOINTMENT (OUTPATIENT)
Dept: INFUSION THERAPY | Facility: HOSPITAL | Age: 84
End: 2019-11-07

## 2019-11-07 LAB
BASOPHILS # BLD AUTO: 0 K/UL — SIGNIFICANT CHANGE UP (ref 0–0.2)
EOSINOPHIL # BLD AUTO: 0.2 K/UL — SIGNIFICANT CHANGE UP (ref 0–0.5)
EOSINOPHIL NFR BLD AUTO: 12 % — HIGH (ref 0–6)
HCT VFR BLD CALC: 21.6 % — LOW (ref 39–50)
HGB BLD-MCNC: 7.3 G/DL — LOW (ref 13–17)
LYMPHOCYTES # BLD AUTO: 0.5 K/UL — LOW (ref 1–3.3)
LYMPHOCYTES # BLD AUTO: 59 % — HIGH (ref 13–44)
MCHC RBC-ENTMCNC: 31.2 PG — SIGNIFICANT CHANGE UP (ref 27–34)
MCHC RBC-ENTMCNC: 33.7 G/DL — SIGNIFICANT CHANGE UP (ref 32–36)
MCV RBC AUTO: 92.6 FL — SIGNIFICANT CHANGE UP (ref 80–100)
MONOCYTES # BLD AUTO: 0.1 K/UL — SIGNIFICANT CHANGE UP (ref 0–0.9)
MONOCYTES NFR BLD AUTO: 3 % — SIGNIFICANT CHANGE UP (ref 2–14)
NEUTROPHILS # BLD AUTO: 0.2 K/UL — LOW (ref 1.8–7.4)
NEUTROPHILS NFR BLD AUTO: 25 % — LOW (ref 43–77)
NEUTS BAND # BLD: 1 % — SIGNIFICANT CHANGE UP (ref 0–8)
NRBC # BLD: 2 /100 — HIGH (ref 0–0)
PLAT MORPH BLD: NORMAL — SIGNIFICANT CHANGE UP
PLATELET # BLD AUTO: 12 K/UL — CRITICAL LOW (ref 150–400)
RBC # BLD: 2.33 M/UL — LOW (ref 4.2–5.8)
RBC # FLD: 16.7 % — HIGH (ref 10.3–14.5)
RBC BLD AUTO: SIGNIFICANT CHANGE UP
WBC # BLD: 0.9 K/UL — CRITICAL LOW (ref 3.8–10.5)
WBC # FLD AUTO: 0.9 K/UL — CRITICAL LOW (ref 3.8–10.5)

## 2019-11-08 ENCOUNTER — APPOINTMENT (OUTPATIENT)
Dept: INFUSION THERAPY | Facility: HOSPITAL | Age: 84
End: 2019-11-08

## 2019-11-08 ENCOUNTER — RESULT REVIEW (OUTPATIENT)
Age: 84
End: 2019-11-08

## 2019-11-08 LAB
ALBUMIN SERPL ELPH-MCNC: 3.2 G/DL
ALP BLD-CCNC: 125 U/L
ALT SERPL-CCNC: 50 U/L
ANION GAP SERPL CALC-SCNC: 19 MMOL/L
AST SERPL-CCNC: 38 U/L
BILIRUB SERPL-MCNC: 0.7 MG/DL
BLD GP AB SCN SERPL QL: NEGATIVE — SIGNIFICANT CHANGE UP
BUN SERPL-MCNC: 39 MG/DL
CALCIUM SERPL-MCNC: 8.2 MG/DL
CHLORIDE SERPL-SCNC: 102 MMOL/L
CO2 SERPL-SCNC: 14 MMOL/L
CREAT SERPL-MCNC: 1.49 MG/DL
EOSINOPHIL # BLD AUTO: 0.1 K/UL — SIGNIFICANT CHANGE UP (ref 0–0.5)
EOSINOPHIL NFR BLD AUTO: 6 % — SIGNIFICANT CHANGE UP (ref 0–6)
GLUCOSE SERPL-MCNC: 111 MG/DL
HCT VFR BLD CALC: 22.7 % — LOW (ref 39–50)
HGB BLD-MCNC: 8 G/DL — LOW (ref 13–17)
LDH SERPL-CCNC: 314 U/L
LYMPHOCYTES # BLD AUTO: 0.3 K/UL — LOW (ref 1–3.3)
LYMPHOCYTES # BLD AUTO: 54 % — HIGH (ref 13–44)
MCHC RBC-ENTMCNC: 32 PG — SIGNIFICANT CHANGE UP (ref 27–34)
MCHC RBC-ENTMCNC: 35.4 G/DL — SIGNIFICANT CHANGE UP (ref 32–36)
MCV RBC AUTO: 90.3 FL — SIGNIFICANT CHANGE UP (ref 80–100)
MONOCYTES # BLD AUTO: 0.1 K/UL — SIGNIFICANT CHANGE UP (ref 0–0.9)
MONOCYTES NFR BLD AUTO: 8 % — SIGNIFICANT CHANGE UP (ref 2–14)
NEUTROPHILS # BLD AUTO: 0.2 K/UL — LOW (ref 1.8–7.4)
NEUTROPHILS NFR BLD AUTO: 32 % — LOW (ref 43–77)
PHOSPHATE SERPL-MCNC: 3.2 MG/DL
PLAT MORPH BLD: NORMAL — SIGNIFICANT CHANGE UP
PLATELET # BLD AUTO: 16 K/UL — CRITICAL LOW (ref 150–400)
POTASSIUM SERPL-SCNC: 4 MMOL/L
PROT SERPL-MCNC: 5.4 G/DL
RBC # BLD: 2.51 M/UL — LOW (ref 4.2–5.8)
RBC # FLD: 16.7 % — HIGH (ref 10.3–14.5)
RBC BLD AUTO: SIGNIFICANT CHANGE UP
RH IG SCN BLD-IMP: POSITIVE — SIGNIFICANT CHANGE UP
SODIUM SERPL-SCNC: 134 MMOL/L
URATE SERPL-MCNC: 4.1 MG/DL
WBC # BLD: 0.7 K/UL — CRITICAL LOW (ref 3.8–10.5)
WBC # FLD AUTO: 0.7 K/UL — CRITICAL LOW (ref 3.8–10.5)

## 2019-11-08 PROCEDURE — 86900 BLOOD TYPING SEROLOGIC ABO: CPT

## 2019-11-08 PROCEDURE — 86850 RBC ANTIBODY SCREEN: CPT

## 2019-11-08 PROCEDURE — 86901 BLOOD TYPING SEROLOGIC RH(D): CPT

## 2019-11-08 PROCEDURE — 86923 COMPATIBILITY TEST ELECTRIC: CPT

## 2019-11-09 ENCOUNTER — APPOINTMENT (OUTPATIENT)
Dept: INFUSION THERAPY | Facility: HOSPITAL | Age: 84
End: 2019-11-09

## 2019-11-09 ENCOUNTER — RESULT REVIEW (OUTPATIENT)
Age: 84
End: 2019-11-09

## 2019-11-09 LAB — PLATELET # BLD MANUAL: 12 K/UL — CRITICAL LOW (ref 150–400)

## 2019-11-11 ENCOUNTER — RESULT REVIEW (OUTPATIENT)
Age: 84
End: 2019-11-11

## 2019-11-11 ENCOUNTER — APPOINTMENT (OUTPATIENT)
Dept: INFUSION THERAPY | Facility: HOSPITAL | Age: 84
End: 2019-11-11

## 2019-11-11 DIAGNOSIS — Z51.11 ENCOUNTER FOR ANTINEOPLASTIC CHEMOTHERAPY: ICD-10-CM

## 2019-11-11 LAB
EOSINOPHIL # BLD AUTO: 0.1 K/UL — SIGNIFICANT CHANGE UP (ref 0–0.5)
EOSINOPHIL NFR BLD AUTO: 4 % — SIGNIFICANT CHANGE UP (ref 0–6)
HCT VFR BLD CALC: 30.2 % — LOW (ref 39–50)
HGB BLD-MCNC: 9.8 G/DL — LOW (ref 13–17)
LYMPHOCYTES # BLD AUTO: 0.5 K/UL — LOW (ref 1–3.3)
LYMPHOCYTES # BLD AUTO: 60 % — HIGH (ref 13–44)
MCHC RBC-ENTMCNC: 30.3 PG — SIGNIFICANT CHANGE UP (ref 27–34)
MCHC RBC-ENTMCNC: 32.6 G/DL — SIGNIFICANT CHANGE UP (ref 32–36)
MCV RBC AUTO: 93 FL — SIGNIFICANT CHANGE UP (ref 80–100)
MONOCYTES # BLD AUTO: 0.2 K/UL — SIGNIFICANT CHANGE UP (ref 0–0.9)
MONOCYTES NFR BLD AUTO: 7 % — SIGNIFICANT CHANGE UP (ref 2–14)
NEUTROPHILS # BLD AUTO: 0.2 K/UL — LOW (ref 1.8–7.4)
NEUTROPHILS NFR BLD AUTO: 29 % — LOW (ref 43–77)
PLAT MORPH BLD: NORMAL — SIGNIFICANT CHANGE UP
PLATELET # BLD AUTO: 3 K/UL — CRITICAL LOW (ref 150–400)
RBC # BLD: 3.25 M/UL — LOW (ref 4.2–5.8)
RBC # FLD: 16.6 % — HIGH (ref 10.3–14.5)
RBC BLD AUTO: SIGNIFICANT CHANGE UP
WBC # BLD: 0.9 K/UL — CRITICAL LOW (ref 3.8–10.5)
WBC # FLD AUTO: 0.9 K/UL — CRITICAL LOW (ref 3.8–10.5)

## 2019-11-13 ENCOUNTER — RESULT REVIEW (OUTPATIENT)
Age: 84
End: 2019-11-13

## 2019-11-13 ENCOUNTER — APPOINTMENT (OUTPATIENT)
Dept: INFUSION THERAPY | Facility: HOSPITAL | Age: 84
End: 2019-11-13

## 2019-11-13 LAB
HCT VFR BLD CALC: 27.7 % — LOW (ref 39–50)
HGB BLD-MCNC: 9.2 G/DL — LOW (ref 13–17)
MCHC RBC-ENTMCNC: 30.4 PG — SIGNIFICANT CHANGE UP (ref 27–34)
MCHC RBC-ENTMCNC: 33.3 G/DL — SIGNIFICANT CHANGE UP (ref 32–36)
MCV RBC AUTO: 91.5 FL — SIGNIFICANT CHANGE UP (ref 80–100)
PLATELET # BLD AUTO: 2 K/UL — CRITICAL LOW (ref 150–400)
PLATELET # BLD MANUAL: 9 K/UL — CRITICAL LOW (ref 150–400)
RBC # BLD: 3.03 M/UL — LOW (ref 4.2–5.8)
RBC # FLD: 16.4 % — HIGH (ref 10.3–14.5)
WBC # BLD: 0.5 K/UL — CRITICAL LOW (ref 3.8–10.5)
WBC # FLD AUTO: 0.5 K/UL — CRITICAL LOW (ref 3.8–10.5)

## 2019-11-14 ENCOUNTER — APPOINTMENT (OUTPATIENT)
Dept: VASCULAR SURGERY | Facility: CLINIC | Age: 84
End: 2019-11-14
Payer: COMMERCIAL

## 2019-11-14 VITALS
HEART RATE: 89 BPM | HEIGHT: 60 IN | SYSTOLIC BLOOD PRESSURE: 104 MMHG | BODY MASS INDEX: 24.94 KG/M2 | TEMPERATURE: 97.9 F | WEIGHT: 127 LBS | DIASTOLIC BLOOD PRESSURE: 61 MMHG

## 2019-11-14 DIAGNOSIS — I73.9 PERIPHERAL VASCULAR DISEASE, UNSPECIFIED: ICD-10-CM

## 2019-11-14 PROCEDURE — 93978 VASCULAR STUDY: CPT

## 2019-11-14 PROCEDURE — 99213 OFFICE O/P EST LOW 20 MIN: CPT

## 2019-11-14 PROCEDURE — 93923 UPR/LXTR ART STDY 3+ LVLS: CPT

## 2019-11-14 NOTE — PHYSICAL EXAM
[Normal Heart Sounds] : normal heart sounds [Normal Breath Sounds] : Normal breath sounds [2+] : left 2+ [Ankle Swelling (On Exam)] : present [Ankle Swelling On The Left] : of the left ankle [Ankle Swelling On The Right] : mild [] : present [Calm] : calm [de-identified] : thin, NAD [de-identified] : eomi, anicteric, temporal wasting [de-identified] : supple, no JVD, no carotid bruit  [FreeTextEntry1] : L foot - weakly dopplerable PT and peroneal signals\par R foot - weakly dopplerable PT signals [de-identified] : soft, nt, nd  [de-identified] : moves all four extremities [de-identified] : dependent rubor of bilateral feet with ecchyomosis of all extremities [de-identified] : A&Ox4

## 2019-11-14 NOTE — DATA REVIEWED
[FreeTextEntry1] : Aortic duplex reviewed. \par Patent EVAR with no evidence of endoleak. Aneurysmal sac 5 cm.\par \par TONE/PVR reviewed.\par Dampened waveforms with R TONE 0.38, L TONE 0.29, suggestive of multilevel disease.

## 2019-11-14 NOTE — ASSESSMENT
[FreeTextEntry1] : 85 year old man with CMMoL and metastatic NSCLC on chemotherapy, with peripheral arterial disease presents for evaluation of LE pain and numbness.\par \par # PAD\par - Symptoms may be due to a combination of diabetic neuropathy and arterial insufficiency. \par - Given his comorbid disease, current treatment with chemotherapy, and what appears to be poor prognosis, I would not recommend vascular surgery intervention at this time. \par - Will trial gabapentin for symptomatic control. \par - Recommend palliative medicine evaluation.\par \par # AAA s/p EVAR\par - Will need serial monitoring. Follow up in six months for repeat US.

## 2019-11-14 NOTE — HISTORY OF PRESENT ILLNESS
[FreeTextEntry1] : 85 year old man with multiple medical problems presents with complaints of bilateral lower extremity pain. His medical history is significant for metastatic NSCLC with squamous cell carcinoma histology and CMMoL undergoing chemotherapy. \par \par His lower extremity symptoms began two years ago. He reports symptoms of numbness and tingling from the plantar aspect of his feet up. He reports that his symptoms are worse at night and it prevents him from sleeping. With the numbness and tingling, he reports pain in his bilateral lower extremities, worsened with ambulation but does not give a clear history of claudication prior to symptom onset. The darkening of the lower extremities have worsened in the past few months since he returned from [facility] in 8/2019. He also has a history of CAD and stent placement in 2018. At that time, it appears that he underwent EVAR procedure in [place], which was seen on CT images obtained during his cancer workup. \par \par He has decreased appetite and energy. and has lost weight since his cancer diagnosis and treatment has commenced.  \par \par He is a former smoker for many years and quit in 8/2019 when diagnosed with NSCLC.

## 2019-11-14 NOTE — REVIEW OF SYSTEMS
[Feeling Poorly] : feeling poorly [Feeling Tired] : feeling tired [Recent Weight Loss (___ Lbs)] : recent [unfilled] ~Ulb weight loss [Arthralgias] : arthralgias [Limb Pain] : limb pain [Sleep Disturbances] : sleep disturbances [As Noted in HPI] : as noted in HPI [Easy Bleeding] : a tendency for easy bleeding [Easy Bruising] : a tendency for easy bruising [Negative] : Endocrine

## 2019-11-14 NOTE — CONSULT LETTER
[Dear  ___] : Dear  [unfilled], [Consult Closing:] : Thank you very much for allowing me to participate in the care of this patient.  If you have any questions, please do not hesitate to contact me. [Please see my note below.] : Please see my note below. [Consult Letter:] : I had the pleasure of evaluating your patient, [unfilled]. [Sincerely,] : Sincerely, [FreeTextEntry1] : He presented to me for evaluation of his LE numbness and pain. I performed an arterial duplex, which confirmed that he has significant multilevel peripheral arterial disease. However, given his comorbidities, I would not recommend intervention at this time. I also suspect there may be an element of diabetic neuropathy given his descriptions of a "burning" pain at the plantar aspects of his feet. I would suggest symptom management with gabapentin. I also agree that a palliative medicine evaluation would be beneficial as I anticipate pain management may play a bigger role in his treatment.  [FreeTextEntry3] : Cecelia Stevenson MD, RPVI\par Division of Vascular & Endovascular Surgery\par [facility], Department of Surgery

## 2019-11-15 ENCOUNTER — APPOINTMENT (OUTPATIENT)
Dept: INFUSION THERAPY | Facility: HOSPITAL | Age: 84
End: 2019-11-15

## 2019-11-15 ENCOUNTER — RESULT REVIEW (OUTPATIENT)
Age: 84
End: 2019-11-15

## 2019-11-15 PROBLEM — I73.9 PAD (PERIPHERAL ARTERY DISEASE): Status: ACTIVE | Noted: 2019-11-15

## 2019-11-15 LAB
ANISOCYTOSIS BLD QL: SIGNIFICANT CHANGE UP
DACRYOCYTES BLD QL SMEAR: SLIGHT — SIGNIFICANT CHANGE UP
ELLIPTOCYTES BLD QL SMEAR: SLIGHT — SIGNIFICANT CHANGE UP
EOSINOPHIL NFR BLD AUTO: 11 % — HIGH (ref 0–6)
HCT VFR BLD CALC: 25.8 % — LOW (ref 39–50)
HGB BLD-MCNC: 9 G/DL — LOW (ref 13–17)
HYPOCHROMIA BLD QL: SLIGHT — SIGNIFICANT CHANGE UP
LYMPHOCYTES # BLD AUTO: 23 % — SIGNIFICANT CHANGE UP (ref 13–44)
MACROCYTES BLD QL: SLIGHT — SIGNIFICANT CHANGE UP
MCHC RBC-ENTMCNC: 31.4 PG — SIGNIFICANT CHANGE UP (ref 27–34)
MCHC RBC-ENTMCNC: 34.8 G/DL — SIGNIFICANT CHANGE UP (ref 32–36)
MCV RBC AUTO: 90.2 FL — SIGNIFICANT CHANGE UP (ref 80–100)
MICROCYTES BLD QL: SLIGHT — SIGNIFICANT CHANGE UP
MONOCYTES NFR BLD AUTO: 10 % — SIGNIFICANT CHANGE UP (ref 2–14)
NEUTROPHILS # BLD AUTO: SIGNIFICANT CHANGE UP (ref 1.8–7.4)
NEUTROPHILS NFR BLD AUTO: 56 % — SIGNIFICANT CHANGE UP (ref 43–77)
PLAT MORPH BLD: NORMAL — SIGNIFICANT CHANGE UP
PLATELET # BLD AUTO: 3 K/UL — CRITICAL LOW (ref 150–400)
PLATELET # BLD MANUAL: 21 K/UL — LOW (ref 150–400)
POIKILOCYTOSIS BLD QL AUTO: SLIGHT — SIGNIFICANT CHANGE UP
POLYCHROMASIA BLD QL SMEAR: SLIGHT — SIGNIFICANT CHANGE UP
RBC # BLD: 2.86 M/UL — LOW (ref 4.2–5.8)
RBC # FLD: 16.5 % — HIGH (ref 10.3–14.5)
RBC BLD AUTO: ABNORMAL
WBC # BLD: 0.7 K/UL — CRITICAL LOW (ref 3.8–10.5)
WBC # FLD AUTO: 0.7 K/UL — CRITICAL LOW (ref 3.8–10.5)

## 2019-11-15 RX ORDER — GABAPENTIN 100 MG/1
100 CAPSULE ORAL 3 TIMES DAILY
Qty: 90 | Refills: 3 | Status: ACTIVE | COMMUNITY
Start: 2019-11-15 | End: 1900-01-01

## 2019-11-18 ENCOUNTER — APPOINTMENT (OUTPATIENT)
Dept: INFUSION THERAPY | Facility: HOSPITAL | Age: 84
End: 2019-11-18

## 2019-11-18 ENCOUNTER — RESULT REVIEW (OUTPATIENT)
Age: 84
End: 2019-11-18

## 2019-11-18 ENCOUNTER — LABORATORY RESULT (OUTPATIENT)
Age: 84
End: 2019-11-18

## 2019-11-18 DIAGNOSIS — R11.2 NAUSEA WITH VOMITING, UNSPECIFIED: ICD-10-CM

## 2019-11-18 LAB
ANISOCYTOSIS BLD QL: SIGNIFICANT CHANGE UP
BASO STIPL BLD QL SMEAR: PRESENT — SIGNIFICANT CHANGE UP
BASOPHILS # BLD AUTO: 0 K/UL — SIGNIFICANT CHANGE UP (ref 0–0.2)
BASOPHILS NFR BLD AUTO: 1 % — SIGNIFICANT CHANGE UP (ref 0–2)
BLASTS # FLD: 2 % — HIGH (ref 0–0)
DACRYOCYTES BLD QL SMEAR: SLIGHT — SIGNIFICANT CHANGE UP
ELLIPTOCYTES BLD QL SMEAR: SLIGHT — SIGNIFICANT CHANGE UP
EOSINOPHIL # BLD AUTO: 0 K/UL — SIGNIFICANT CHANGE UP (ref 0–0.5)
EOSINOPHIL NFR BLD AUTO: 2 % — SIGNIFICANT CHANGE UP (ref 0–6)
HCT VFR BLD CALC: 26.3 % — LOW (ref 39–50)
HGB BLD-MCNC: 8.7 G/DL — LOW (ref 13–17)
HYPOCHROMIA BLD QL: SLIGHT — SIGNIFICANT CHANGE UP
LYMPHOCYTES # BLD AUTO: 0.4 K/UL — LOW (ref 1–3.3)
LYMPHOCYTES # BLD AUTO: 31 % — SIGNIFICANT CHANGE UP (ref 13–44)
MACROCYTES BLD QL: SLIGHT — SIGNIFICANT CHANGE UP
MCHC RBC-ENTMCNC: 30 PG — SIGNIFICANT CHANGE UP (ref 27–34)
MCHC RBC-ENTMCNC: 33 G/DL — SIGNIFICANT CHANGE UP (ref 32–36)
MCV RBC AUTO: 90.8 FL — SIGNIFICANT CHANGE UP (ref 80–100)
MICROCYTES BLD QL: SLIGHT — SIGNIFICANT CHANGE UP
MONOCYTES # BLD AUTO: 0.2 K/UL — SIGNIFICANT CHANGE UP (ref 0–0.9)
MONOCYTES NFR BLD AUTO: 4 % — SIGNIFICANT CHANGE UP (ref 2–14)
NEUTROPHILS # BLD AUTO: 0.7 K/UL — LOW (ref 1.8–7.4)
NEUTROPHILS NFR BLD AUTO: 60 % — SIGNIFICANT CHANGE UP (ref 43–77)
NRBC # BLD: 1 /100 — HIGH (ref 0–0)
PLAT MORPH BLD: NORMAL — SIGNIFICANT CHANGE UP
PLATELET # BLD AUTO: 7 K/UL — CRITICAL LOW (ref 150–400)
POIKILOCYTOSIS BLD QL AUTO: SLIGHT — SIGNIFICANT CHANGE UP
POLYCHROMASIA BLD QL SMEAR: SLIGHT — SIGNIFICANT CHANGE UP
RBC # BLD: 2.89 M/UL — LOW (ref 4.2–5.8)
RBC # FLD: 16.5 % — HIGH (ref 10.3–14.5)
RBC BLD AUTO: ABNORMAL
WBC # BLD: 1.3 K/UL — LOW (ref 3.8–10.5)
WBC # FLD AUTO: 1.3 K/UL — LOW (ref 3.8–10.5)

## 2019-11-20 ENCOUNTER — INPATIENT (INPATIENT)
Facility: HOSPITAL | Age: 84
LOS: 0 days | DRG: 951 | End: 2019-11-21
Attending: INTERNAL MEDICINE | Admitting: HOSPITALIST
Payer: COMMERCIAL

## 2019-11-20 ENCOUNTER — APPOINTMENT (OUTPATIENT)
Dept: INFUSION THERAPY | Facility: HOSPITAL | Age: 84
End: 2019-11-20

## 2019-11-20 VITALS
DIASTOLIC BLOOD PRESSURE: 82 MMHG | WEIGHT: 126.99 LBS | OXYGEN SATURATION: 100 % | HEIGHT: 67 IN | RESPIRATION RATE: 16 BRPM | TEMPERATURE: 98 F | HEART RATE: 82 BPM | SYSTOLIC BLOOD PRESSURE: 139 MMHG

## 2019-11-20 DIAGNOSIS — C93.10 CHRONIC MYELOMONOCYTIC LEUKEMIA NOT HAVING ACHIEVED REMISSION: ICD-10-CM

## 2019-11-20 DIAGNOSIS — C78.00 SECONDARY MALIGNANT NEOPLASM OF UNSPECIFIED LUNG: ICD-10-CM

## 2019-11-20 DIAGNOSIS — E11.9 TYPE 2 DIABETES MELLITUS WITHOUT COMPLICATIONS: ICD-10-CM
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
## 2019-11-20 DIAGNOSIS — Z51.5 ENCOUNTER FOR PALLIATIVE CARE: ICD-10-CM

## 2019-11-20 DIAGNOSIS — Z71.89 OTHER SPECIFIED COUNSELING: ICD-10-CM

## 2019-11-20 DIAGNOSIS — S06.5X9A TRAUMATIC SUBDURAL HEMORRHAGE WITH LOSS OF CONSCIOUSNESS OF UNSPECIFIED DURATION, INITIAL ENCOUNTER: ICD-10-CM

## 2019-11-20 DIAGNOSIS — Z02.9 ENCOUNTER FOR ADMINISTRATIVE EXAMINATIONS, UNSPECIFIED: ICD-10-CM

## 2019-11-20 DIAGNOSIS — I10 ESSENTIAL (PRIMARY) HYPERTENSION: ICD-10-CM

## 2019-11-20 DIAGNOSIS — G93.49 OTHER ENCEPHALOPATHY: ICD-10-CM

## 2019-11-20 LAB
ALBUMIN SERPL ELPH-MCNC: 3 G/DL — LOW (ref 3.3–5)
ALP SERPL-CCNC: 132 U/L — HIGH (ref 40–120)
ALT FLD-CCNC: 22 U/L — SIGNIFICANT CHANGE UP (ref 10–45)
ANION GAP SERPL CALC-SCNC: 16 MMOL/L — SIGNIFICANT CHANGE UP (ref 5–17)
APTT BLD: 34.6 SEC — SIGNIFICANT CHANGE UP (ref 27.5–36.3)
AST SERPL-CCNC: 22 U/L — SIGNIFICANT CHANGE UP (ref 10–40)
BASOPHILS # BLD AUTO: 0 K/UL — SIGNIFICANT CHANGE UP (ref 0–0.2)
BASOPHILS NFR BLD AUTO: 0 % — SIGNIFICANT CHANGE UP (ref 0–2)
BILIRUB SERPL-MCNC: 1.1 MG/DL — SIGNIFICANT CHANGE UP (ref 0.2–1.2)
BLD GP AB SCN SERPL QL: NEGATIVE — SIGNIFICANT CHANGE UP
BUN SERPL-MCNC: 34 MG/DL — HIGH (ref 7–23)
CALCIUM SERPL-MCNC: 9.4 MG/DL — SIGNIFICANT CHANGE UP (ref 8.4–10.5)
CHLORIDE SERPL-SCNC: 96 MMOL/L — SIGNIFICANT CHANGE UP (ref 96–108)
CO2 SERPL-SCNC: 22 MMOL/L — SIGNIFICANT CHANGE UP (ref 22–31)
CREAT SERPL-MCNC: 1.67 MG/DL — HIGH (ref 0.5–1.3)
EOSINOPHIL # BLD AUTO: 0 K/UL — SIGNIFICANT CHANGE UP (ref 0–0.5)
EOSINOPHIL NFR BLD AUTO: 0 % — SIGNIFICANT CHANGE UP (ref 0–6)
GLUCOSE SERPL-MCNC: 167 MG/DL — HIGH (ref 70–99)
HCT VFR BLD CALC: 22.5 % — LOW (ref 39–50)
HGB BLD-MCNC: 7.6 G/DL — LOW (ref 13–17)
INR BLD: 1.54 RATIO — HIGH (ref 0.88–1.16)
LYMPHOCYTES # BLD AUTO: 0.14 K/UL — LOW (ref 1–3.3)
LYMPHOCYTES # BLD AUTO: 7 % — LOW (ref 13–44)
MCHC RBC-ENTMCNC: 29.3 PG — SIGNIFICANT CHANGE UP (ref 27–34)
MCHC RBC-ENTMCNC: 33.8 GM/DL — SIGNIFICANT CHANGE UP (ref 32–36)
MCV RBC AUTO: 86.9 FL — SIGNIFICANT CHANGE UP (ref 80–100)
MONOCYTES # BLD AUTO: 0.06 K/UL — SIGNIFICANT CHANGE UP (ref 0–0.9)
MONOCYTES NFR BLD AUTO: 3 % — SIGNIFICANT CHANGE UP (ref 2–14)
NEUTROPHILS # BLD AUTO: 1.71 K/UL — LOW (ref 1.8–7.4)
NEUTROPHILS NFR BLD AUTO: 76 % — SIGNIFICANT CHANGE UP (ref 43–77)
PLATELET # BLD AUTO: 9 K/UL — CRITICAL LOW (ref 150–400)
POTASSIUM SERPL-MCNC: 4.2 MMOL/L — SIGNIFICANT CHANGE UP (ref 3.5–5.3)
POTASSIUM SERPL-SCNC: 4.2 MMOL/L — SIGNIFICANT CHANGE UP (ref 3.5–5.3)
PROT SERPL-MCNC: 5.9 G/DL — LOW (ref 6–8.3)
PROTHROM AB SERPL-ACNC: 18 SEC — HIGH (ref 10–12.9)
RBC # BLD: 2.59 M/UL — LOW (ref 4.2–5.8)
RBC # FLD: 17.3 % — HIGH (ref 10.3–14.5)
RH IG SCN BLD-IMP: POSITIVE — SIGNIFICANT CHANGE UP
SODIUM SERPL-SCNC: 134 MMOL/L — LOW (ref 135–145)
WBC # BLD: 1.99 K/UL — LOW (ref 3.8–10.5)
WBC # FLD AUTO: 1.99 K/UL — LOW (ref 3.8–10.5)

## 2019-11-20 PROCEDURE — 72125 CT NECK SPINE W/O DYE: CPT | Mod: 26

## 2019-11-20 PROCEDURE — 70450 CT HEAD/BRAIN W/O DYE: CPT | Mod: 26

## 2019-11-20 PROCEDURE — 71045 X-RAY EXAM CHEST 1 VIEW: CPT | Mod: 26

## 2019-11-20 PROCEDURE — 99222 1ST HOSP IP/OBS MODERATE 55: CPT

## 2019-11-20 PROCEDURE — 72170 X-RAY EXAM OF PELVIS: CPT | Mod: 26

## 2019-11-20 PROCEDURE — 99231 SBSQ HOSP IP/OBS SF/LOW 25: CPT

## 2019-11-20 PROCEDURE — 99223 1ST HOSP IP/OBS HIGH 75: CPT | Mod: GC

## 2019-11-20 PROCEDURE — 99291 CRITICAL CARE FIRST HOUR: CPT

## 2019-11-20 PROCEDURE — 85060 BLOOD SMEAR INTERPRETATION: CPT

## 2019-11-20 RX ORDER — ROBINUL 0.2 MG/ML
0.4 INJECTION INTRAMUSCULAR; INTRAVENOUS EVERY 4 HOURS
Refills: 0 | Status: DISCONTINUED | OUTPATIENT
Start: 2019-11-20 | End: 2019-11-21

## 2019-11-20 RX ORDER — HYDROMORPHONE HYDROCHLORIDE 2 MG/ML
0.2 INJECTION INTRAMUSCULAR; INTRAVENOUS; SUBCUTANEOUS
Refills: 0 | Status: DISCONTINUED | OUTPATIENT
Start: 2019-11-20 | End: 2019-11-21

## 2019-11-20 RX ORDER — ONDANSETRON 8 MG/1
4 TABLET, FILM COATED ORAL ONCE
Refills: 0 | Status: COMPLETED | OUTPATIENT
Start: 2019-11-20 | End: 2019-11-20

## 2019-11-20 RX ORDER — ACETAMINOPHEN 500 MG
1000 TABLET ORAL ONCE
Refills: 0 | Status: COMPLETED | OUTPATIENT
Start: 2019-11-20 | End: 2019-11-20

## 2019-11-20 RX ORDER — INFLUENZA VIRUS VACCINE 15; 15; 15; 15 UG/.5ML; UG/.5ML; UG/.5ML; UG/.5ML
0.5 SUSPENSION INTRAMUSCULAR ONCE
Refills: 0 | Status: DISCONTINUED | OUTPATIENT
Start: 2019-11-20 | End: 2019-11-21

## 2019-11-20 RX ADMIN — ROBINUL 0.4 MILLIGRAM(S): 0.2 INJECTION INTRAMUSCULAR; INTRAVENOUS at 17:28

## 2019-11-20 RX ADMIN — ONDANSETRON 4 MILLIGRAM(S): 8 TABLET, FILM COATED ORAL at 11:30

## 2019-11-20 RX ADMIN — Medication 400 MILLIGRAM(S): at 22:58

## 2019-11-20 NOTE — CONSULT NOTE ADULT - SUBJECTIVE AND OBJECTIVE BOX
p ([id number])     HPI:  86 yo male PMhx HTN, DM, Lung CA w/ mets to lung, CML currently on chemo presents to the ED c/o fall this AM ~0530. Wife translates at bedside per pt request, she reports pt got out of bed this morning and felt weak upon standing, called out to wife in next room because felt self falling. Wife came in immediately after hearing him call out, saw pt on floor on his R side, awake and alert.    Imaging:    Exam:  deferred  --Anticoagulation:    =====================  PAST MEDICAL HISTORY   Chronic myelomonocytic leukemia not having achieved remission  Lung cancer  High cholesterol  HTN (hypertension)  Diabetes mellitus    PAST SURGICAL HISTORY   No significant past surgical history        MEDICATIONS:  Antibiotics:    Neuro:    Other:      SOCIAL HISTORY:   Occupation:   Marital Status:     FAMILY HISTORY:  No pertinent family history in first degree relatives      ROS: Negative except per HPI    LABS:  PT/INR - ( 20 Nov 2019 11:42 )   PT: 18.0 sec;   INR: 1.54 ratio         PTT - ( 20 Nov 2019 11:42 )  PTT:34.6 sec                        7.6    1.99  )-----------( 9        ( 20 Nov 2019 11:42 )             22.5     11-20    134<L>  |  96  |  34<H>  ----------------------------<  167<H>  4.2   |  22  |  1.67<H>    Ca    9.4      20 Nov 2019 11:42    TPro  5.9<L>  /  Alb  3.0<L>  /  TBili  1.1  /  DBili  x   /  AST  22  /  ALT  22  /  AlkPhos  132<H>  11-20

## 2019-11-20 NOTE — CONSULT NOTE ADULT - ATTENDING COMMENTS
84 yo man with CAD s/p stent x 3, AAA, recent diagnosis of lung ca with mets to liver, chronic myelomonocytic leukemia with thrombocytopenia, on chemotherapy (Keytruda) presenting s/p fall at home. He received a head ct which demonstrated large acute left subdural hematoma with mass effect and significant midline shift to the right. A level one trauma was called. Primary survey with GCS 4. His airway was not protected but his wife made him comfort measures only. Hemodynamically normal and hypertensive. He was downgraded to a level 3 consult. No further imaging was to be performed as the patient has a poor prognosis and appeared to be posturing at this time.
Patient was an . 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 He was an independent person. Wife/HCP Maddie Varela and son in agreement that patient recently expressed that he would not want further resuscitative measures. Wife and son in full agreement goals guided towards symptoms Rx only. DNR/DNI, MOLST in chart. No further blood draws, no home meds, no tube feeds.  Will transfer to PCU when a bed becomes available.

## 2019-11-20 NOTE — H&P ADULT - ASSESSMENT
85 year old male with PMH of HTN, DM, metastatic lung ca, CML currently on chemotherapy with last cycle 11/2019 who presents to the ED after a fall in his home found to have a large L subdural hematoma with mass effect and midline shift now full comfort care awaiting bed in PCU.

## 2019-11-20 NOTE — ED PROVIDER NOTE - RESPIRATORY, MLM
Breath sounds clear and equal bilaterally. No wheezing, rales or rhonchi. No chest wall tenderness, no flail chest, no

## 2019-11-20 NOTE — H&P ADULT - ATTENDING COMMENTS
Dr. Reina Headley  Division of Hospital Medicine  [facility]   Pager: [phone]    appreciate palliative care assistance and input.  confirmed with wife and son bedside their wishes for full comfort measures.  informed nurse bedside regarding giving glycopyrrolate for secretions, currently with copious secretions  comfortable appearing at this time. Dr. Reina Headley  Division of Hospital Medicine     Pager: [phone]    appreciate palliative care assistance and input.  confirmed with wife and son bedside their wishes for full comfort measures.  informed nurse bedside regarding giving glycopyrrolate for secretions, currently with copious secretions  comfortable appearing at this time. appreciate palliative care assistance. awaiting bed in PCU but family aware patient may pass prior to PCU bed becoming available.

## 2019-11-20 NOTE — H&P ADULT - PROBLEM SELECTOR PLAN 1
L subdural hematoma with mass effect and midline shift with early signs of herniation. patient with rapid decline now obtunded and not responsive to verbal stimuli.   - appreciate palliative care assistance regarding goals of care; confirmed with wife and son bedside again their wishes for full comfort care measures  - PCU aware of patient; awaiting bed availability for transfer to PCU  - c/w dilaudid 0.2mg IVP q1h PRN for mod-severe pain  - c/w dilaudid 0.2mg IVP q1h PRN for labored breathing or RR>28  - c/w ativan 0.5mg IVP q1h PRN for agitation  - c/w glycopyrrolate 0.4mg IVP q4h PRN for secretions - informed ED nurse to give dose now for secretions  - c/w ativan 2mg IVP q1 PRN if seizure  - MOLST in chart L subdural hematoma with mass effect and midline shift with early signs of herniation. patient with rapid decline now obtunded and not responsive to verbal stimuli.   - appreciate palliative care assistance regarding goals of care; confirmed with wife and son bedside again their wishes for FULL COMFORT CARE MEASURES  - PCU aware of patient; awaiting bed availability for transfer to PCU  - c/w dilaudid 0.2mg IVP q1h PRN for mod-severe pain  - c/w dilaudid 0.2mg IVP q1h PRN for labored breathing or RR>28  - c/w ativan 0.5mg IVP q1h PRN for agitation  - c/w glycopyrrolate 0.4mg IVP q4h PRN for secretions - informed ED nurse to give dose now for secretions  - c/w ativan 2mg IVP q1 PRN if seizure  - MOLST in chart

## 2019-11-20 NOTE — ED ADULT NURSE REASSESSMENT NOTE - NS ED NURSE REASSESS COMMENT FT1
Received report from CHAGO Pino. Patient seen resting in stretcher with wife at the bedside. Patient currently a/ox0, tachycardic, hypotensive and tachypneic. Patient responds to painful stimuli at this time. Patient placed on 15L NRB mask for comfort measures. Lung sounds are clear and equal b/l with no labored breathing noted. Abdomen is soft, nontender and nondistended. Peripheral pulses are strong and equal b/l with no edema noted. Skin is warm, dry and intact. Plan of care discussed and initiated with family. Family verbalizes understanding. Will continue to monitor and reassess.

## 2019-11-20 NOTE — ED ADULT NURSE NOTE - PMH
Chronic myelomonocytic leukemia not having achieved remission    Diabetes mellitus    High cholesterol    HTN (hypertension)    Lung cancer

## 2019-11-20 NOTE — ED PROVIDER NOTE - ENMT, MLM
Head normocephalic, atraumatic. Airway patent, speaking clearly, Nasal mucosa clear. Mouth with normal mucosa. Throat has no vesicles, no oropharyngeal exudates and uvula is midline. No posterior auricular ecchymosis or tenderness. No rhinorrhea.

## 2019-11-20 NOTE — ED PROVIDER NOTE - PROGRESS NOTE DETAILS
Per wife at bedside pt c/o worsening headache, called CT to expedite given worsening pain and low platelets, concern for bleed. CT tech at bedside taking pt to scan now. - José Miguel Lee PA-C Pt on table at CT and vomited. Attng aware, zofran ordered to subside sxs so that CT can be obtained emergently given worsening sx and concern for bleed. RN aware and taking zofran to CT to give to pt on table. - José Miguel Lee PA-C CT reviewed, large bleed noted w/ shift, nsg immediately called at that time reviewed scan, level 1 called given degree of herniation, bleed and low platelets. Pt deteriorated clinically, now not responding to wife. Goals of care discussed w/ wife (healthcare proxy) at bedside who stated pt DNR/DNI. Care decided as comfort measures. Son contacted, agrees with plan and DNR/DNI. - José Miguel Lee PA-C Palliative attg Dr. Villalobos evaluated pt and advised he will put in medication orders for comfort measures. He spoke w/ PCU, no bed currently and advised to admit pt to hospitalist and that pt would be 1st person on board to receive PCU bed. - José Miguel Lee PA-C Pt endorsed to hospitalist Dr. Smith for admission. Informed of all of the above as written. Attending aware. - José Miguel Lee PA-C CT reviewed, large bleed noted w/ shift, nsg immediately called at that time reviewed scan, level 1 called given degree of herniation, bleed and low platelets. Pt deteriorated clinically, now not responding to wife, pupils unequal L>R. Goals of care discussed w/ wife (healthcare proxy) at bedside who stated pt DNR/DNI. Care decided as comfort measures. Son contacted, agrees with plan and DNR/DNI. - José Miguel Lee PA-C

## 2019-11-20 NOTE — ED PROVIDER NOTE - NEUROLOGICAL, MLM
Alert and oriented, no focal deficits, no motor or sensory deficits, freely moving all extremities with 5/5 strength. CN II-XII intact.

## 2019-11-20 NOTE — CONSULT NOTE ADULT - ASSESSMENT
86 yo man with thrombocytopenia 2/2 CML on chemotherapy s/p fall, sustaining a large SDH with significant midline shift and suggestion of early herniation. Patient with GCS 4.      - Wife requesting DNR / DNI and comfort measures only   - No additional imaging or trauma surgery intervention     Patient seen and discussed with Trauma Surgery Attending, Dr. Mallory Rosas MD PGY4  [id number]

## 2019-11-20 NOTE — H&P ADULT - NSHPLABSRESULTS_GEN_ALL_CORE
Labs reviewed:                         7.6    1.99  )-----------( 9        ( 20 Nov 2019 11:42 )             22.5     11-20    134<L>  |  96  |  34<H>  ----------------------------<  167<H>  4.2   |  22  |  1.67<H>    Ca    9.4      20 Nov 2019 11:42    TPro  5.9<L>  /  Alb  3.0<L>  /  TBili  1.1  /  DBili  x   /  AST  22  /  ALT  22  /  AlkPhos  132<H>  11-20    PT/INR - ( 20 Nov 2019 11:42 )   PT: 18.0 sec;   INR: 1.54 ratio         PTT - ( 20 Nov 2019 11:42 )  PTT:34.6 sec    CAPILLARY BLOOD GLUCOSE        CT Head:  IMPRESSION: Large acute left subdural hematoma with mass effect and   significant midline shift to the right. Signs of early herniation with   suprasellar cistern effacement and effacement of the left   perimesencephalic cistern.    CT Cervical Spine:  IMPRESSION:    No acute fracture or traumatic subluxation. No prevertebral soft tissue   swelling.    Degenerative changes.    Imaging personally reviewed: CT Head showing large L subdurla hematoma with mass effect and significant midline shift with early signs of herniation

## 2019-11-20 NOTE — ED PROVIDER NOTE - ATTENDING CONTRIBUTION TO CARE
Dr Cain Note: · 86 yo male pmhx HTN, DM, Lung CA w/ mets to lung, CML currently on chemo presents to the ED after a fall this morning, approx 530.  Pt was feeling weak once he got out of bed, fell to ground, no LOC as per wife,   Wife states eh called out, found him n ground, +injury to right side of head   Wife translates           A/P  +head strike 2/2 weakness  1-weakness r/o anemia, infection  labs,. cultures   2-head injury - r/o intracranial bleed/ traumatic event- CT head CT cspine Dr Cain Note: · 86 yo male pmhx HTN, DM, Lung CA w/ mets to lung, CML currently on chemo presents to the ED after a fall this morning, approx 530.  Pt was feeling weak once he got out of bed, fell to ground, no LOC as per wife,   Wife states eh called out, found him n ground, +injury to right side of head   Wife translates           A/P  +head strike 2/2 weakness  1-weakness r/o anemia, infection  labs,. cultures   2-head injury - r/o intracranial bleed/ traumatic event- CT head CT cspine    Pt brought to CT for suspicion of intracranial injury, started vomiting, zofran ordered     CT reviewed while pt in CT NS called immediately for large intrvanial bleed  Level 1 trauma called due to mechanism and shift   Wife in ED- discussed at length he critical status of pt   She states she is health care proxy, she states pt does want to be intubated, agrees to sign MOLST but wants me to call son- Antony Varela   Pt was upgraded to Level 1 trauma due to mehcanian and bleed Dr Cain Note: · 86 yo male pmhx HTN, DM, Lung CA w/ mets to lung, CML currently on chemo presents to the ED after a fall this morning, approx 530.  Pt was feeling weak once he got out of bed, fell to ground, no LOC as per wife,   Wife states eh called out, found him n ground, +injury to right side of head   Wife translates     awake, appears tired   lungs clear,  cardiac rrr no murmurs   Awake, alert, no focal deficits         A/P  +head strike 2/2 weakness  1-weakness r/o anemia, infection  labs,. cultures   2-head injury - r/o intracranial bleed/ traumatic event- CT head CT cspine    Pt brought to CT for suspicion of intracranial injury, started vomiting, zofran ordered     CT reviewed while pt in CT NS called immediately for large intercranial bleed  Level 1 trauma called due to mechanism and shift   Wife in ED- discussed at length he critical status of pt   She states she is health care proxy, she states pt does want to be intubated, agrees to sign MOLST but wants me to call son- Antony Varela   I spoke with son , he agrees with is mother and states that his parents have had this conversation multiple times and his father was clear about wanting to be DNR/DNI  Son aware of critical state, on his way to hospital     Norris Lamb signed MOLST DNR/DNI, she wants comfort care  Palliative consult placed     Pt was upgraded to Level 1 trauma due to mehcanian and bleed Dr Cain Note: · 84 yo male pmhx HTN, DM, Lung CA w/ mets to lung, CML currently on chemo presents to the ED after a fall this morning, approx 530.  Pt was feeling weak once he got out of bed, fell to ground, no LOC as per wife,   Wife states eh called out, found him n ground, +injury to right side of head   Wife translates     awake, appears tired   lungs clear,  cardiac rrr no murmurs   Awake, alert, no focal deficits         A/P  +head strike 2/2 weakness  1-weakness r/o anemia, infection  labs,. cultures   2-head injury - r/o intracranial bleed/ traumatic event- CT head CT cspine    Pt brought to CT for suspicion of intracranial injury, started vomiting, zofran ordered     CT reviewed while pt in CT NS called immediately for large intercranial bleed  Level 1 trauma called due to mechanism and shift   Wife in ED- discussed at length he critical status of pt   She states she is health care proxy, she states pt does want to be intubated, agrees to sign MOLST but wants me to call son- Antony Chen   I spoke with son , he agrees with is mother and states that his parents have had this conversation multiple times and his father was clear about wanting to be DNR/DNI  Son aware of critical state, on his way to hospital     Wife Maddie Lamb signed MOLST DNR/DNI, she wants comfort care  Palliative consult placed     Pt was upgraded to Level 1 trauma due to mechanism and bleed- downgraded after

## 2019-11-20 NOTE — CONSULT NOTE ADULT - ASSESSMENT
Za Varela  85M extensive medical problems with thrombocytopenia and platelets of 07, fall out of bed with worsening mental status and large acute L SDH with shift  - Discussed with wife who prefers DNR/DNI comfort measure at this time given patients poor prognosis and overall health  - No further neurosurgical intervention  - Palliative consult

## 2019-11-20 NOTE — H&P ADULT - PROBLEM SELECTOR PLAN 3
Transitions of Care Status:  1.  Name of PCP: Dr. Demetri Bertrand  2.  PCP Contacted on Admission: [x] Y    [ ] N    3.  PCP contacted at Discharge: [ ] Y    [ ] N    [ ] N/A  4.  Post-Discharge Appointment Date and Location:  5.  Summary of Handoff given to PCP: Transitions of Care Status:  1.  Name of PCP: Dr. Cornelius Bertrand  2.  PCP Contacted on Admission: [x] Y    [ ] N    3.  PCP contacted at Discharge: [ ] Y    [ ] N    [ ] N/A  4.  Post-Discharge Appointment Date and Location:  5.  Summary of Handoff given to PCP: Transitions of Care Status:  1.  Name of PCP: Dr. Cornelius Bertrand  2.  PCP Contacted on Admission: [x] Y    [ ] N  (left message on after hours answering service)  3.  PCP contacted at Discharge: [ ] Y    [ ] N    [ ] N/A  4.  Post-Discharge Appointment Date and Location:  5.  Summary of Handoff given to PCP:

## 2019-11-20 NOTE — CONSULT NOTE ADULT - SUBJECTIVE AND OBJECTIVE BOX
HPI:  84 yo male pmhx HTN, DM, Lung CA w/ mets to lung, CML currently on chemo last dose early 11/2019 presents to the ED after a fall this morning, approx 530.  Pt was feeling weak once he got out of bed, fell to ground, no LOC as per wife,   Wife states he called out from where he fell. Wife found him on the ground, +injury to right side of head without other signs of overt trauma. Patient was initially able to speak and walk after the fall. While in the ED patient's mental status drastically declined. Patient is AOx3 at baseline, ambulatory, and was making jokes earlier this morning. Patient is currently obtunded, nonverbal, and without any purposeful movement.    PERTINENT PM/SXH:   Chronic myelomonocytic leukemia not having achieved remission  Lung cancer  High cholesterol  HTN (hypertension)  Diabetes mellitus    No significant past surgical history    FAMILY HISTORY:  No pertinent family history in first degree relatives    ITEMS NOT CHECKED ARE NOT PRESENT    SOCIAL HISTORY:   Significant other/partner:  [x]  Children:  [x]  Sabianist/Spirituality: Catholic but states not Alevism  Substance hx:  Never smoker  Home Opioid hx: Not on controlled substances  [ ] I-Stop Reference No:  Living Situation: [x]Home  [ ]Long term care  [ ]Rehab [ ]Other    ADVANCE DIRECTIVES:    DNR  MOLST  [x]  Living Will  [ ]   DECISION MAKER(s):  [x] Health Care Proxy(s): wife  [ ] Surrogate(s)  [ ] Guardian           Name(s): Maddie Varela Phone Number(s): [phone]    BASELINE (I)ADL(s) (prior to admission):  [place]: [x]Total  [ ] Moderate [ ]Dependent    Allergies    No Known Allergies    Intolerances    MEDICATIONS  (STANDING):    MEDICATIONS  (PRN):    PRESENT SYMPTOMS: [x]Unable to obtain due to poor mentation   Source if other than patient:  [ ]Family   [ ]Team     Pain: [ ] yes [ ] no  QOL impact -   Location -                    Aggravating factors -  Quality -  Radiation -  Timing-  Severity (0-10 scale):  Minimal acceptable level (0-10 scale):     PAIN AD Score: 1    [email or web address]/painad.pdf (press ctrl +  left click to view)    Dyspnea:                           [x]Mild [ ]Moderate [ ]Severe  Anxiety:                             [ ]Mild [ ]Moderate [ ]Severe  Fatigue:                             [ ]Mild [ ]Moderate [ ]Severe  Nausea:                             [ ]Mild [ ]Moderate [ ]Severe  Loss of appetite:              [ ]Mild [ ]Moderate [ ]Severe  Constipation:                    [ ]Mild [ ]Moderate [ ]Severe    Other Symptoms:  [x]All other review of systems negative     Palliative Performance Status Version 2:      10   %    [email or web address]  PHYSICAL EXAM:  Vital Signs Last 24 Hrs  T(C): 36.6 (20 Nov 2019 09:35), Max: 36.6 (20 Nov 2019 09:35)  T(F): 97.8 (20 Nov 2019 09:35), Max: 97.8 (20 Nov 2019 09:35)  HR: 84 (20 Nov 2019 09:35) (82 - 84)  BP: 125/67 (20 Nov 2019 09:35) (125/67 - 139/82)  BP(mean): --  RR: 16 (20 Nov 2019 09:35) (16 - 16)  SpO2: 99% (20 Nov 2019 09:35) (99% - 100%) I&O's Summary  GENERAL:  [ ]Alert  [ ]Oriented x   [ ]Lethargic  [ ]Cachexia  [x]Unarousable  [ ]Verbal  [x]Non-Verbal  Behavioral:   [ ] Anxiety  [x] Delirium [ ] Agitation [ ] Other  HEENT:  [ ]Normal   [x]Dry mouth   [ ]ET Tube/Trach  [ ]Oral lesions  PULMONARY:   [x]Clear [ ]Tachypnea  [ ]Audible excessive secretions   [ ]Rhonchi        [ ]Right [ ]Left [ ]Bilateral  [ ]Crackles        [ ]Right [ ]Left [ ]Bilateral  [ ]Wheezing     [ ]Right [ ]Left [ ]Bilatera  [ ]Diminished breath sounds [ ]right [ ]left [ ]bilateral  CARDIOVASCULAR:    [x]Regular [ ]Irregular [ ]Tachy  [ ]Alexander [ ]Murmur [ ]Other  GASTROINTESTINAL:  [x]Soft  [ ]Distended   [x]+BS  [x]Non tender [ ]Tender  [ ]PEG [ ]OGT/ NGT  Last BM:  GENITOURINARY:  [ ]Normal [x] Incontinent   [ ]Oliguria/Anuria   [ ]Smith  MUSCULOSKELETAL:   [ ]Normal   [ ]Weakness  [x]Bed/Wheelchair bound [ ]Edema  NEUROLOGIC:   [ ]No focal deficits  [ ] Cognitive impairment  [ ] Dysphagia [ ]Dysarthria [ ] Paresis [x]Other: pupils equally blown and fixed, unarousable, nonverbal, no purposeful movement  SKIN:   [ ]Normal    [ ]Rash  [ ]Pressure ulcer(s) [ ]y [ ]n present on admission  [x] old scattered ecchymoses of arms    CRITICAL CARE:  [ ] Shock Present  [ ]Septic [ ]Cardiogenic [ ]Neurologic [ ]Hypovolemic  [ ]  Vasopressors [ ]  Inotropes   [ ] Respiratory failure present [ ] mechanical ventilation [ ] non-invasive ventilatory support [ ] High flow  [ ] Acute  [ ] Chronic [ ] Hypoxic  [ ] Hypercarbic [ ] Other  [ ] Other organ failure     LABS:                        7.6    1.99  )-----------( 9        ( 20 Nov 2019 11:42 )             22.5   11-20    134<L>  |  96  |  34<H>  ----------------------------<  167<H>  4.2   |  22  |  1.67<H>    Ca    9.4      20 Nov 2019 11:42    TPro  5.9<L>  /  Alb  3.0<L>  /  TBili  1.1  /  DBili  x   /  AST  22  /  ALT  22  /  AlkPhos  132<H>  11-20  PT/INR - ( 20 Nov 2019 11:42 )   PT: 18.0 sec;   INR: 1.54 ratio         PTT - ( 20 Nov 2019 11:42 )  PTT:34.6 sec      RADIOLOGY & ADDITIONAL STUDIES:  < from: CT Head No Cont (11.20.19 @ 12:02) >  CLINICAL INDICATION: Fall, trauma    5mm axial sections of the brain were obtained from base to vertex,   without the intravenous administration ofcontrast material. Coronal and   sagittal computer generated reconstructed views are available.    Comparison is made with the prior MRI of 8/29/2019.    A large acute left frontal parietal holohemispheric subdural hematoma is   identified with its greatest depth measuring 2 cm. There is   interhemispheric extension. There is mass effect on the left lateral   ventricle and significant midline shift to the right of 1.7 cm. The right   lateral ventricle is dilated. The left lateral ventricle is compressed.   There is mild effacement of the left suprasellar cistern and left   perimesencephalic cistern.    There has been previous bilateral lens replacement surgery.        IMPRESSION: Large acute left subdural hematoma with mass effect and   significant midline shift to the right. Signs of early herniation with   suprasellar cistern effacement and effacement of the left   perimesencephalic cistern.    < end of copied text >    PROTEIN CALORIE MALNUTRITION PRESENT:     Height (cm): 170.18 (11-20-19 @ 09:17), 167.5 (10-15-19 @ 18:48), 167.64 (09-22-19 @ 22:37)  Weight (kg): 57.6 (11-20-19 @ 09:17), 58 (10-15-19 @ 18:48), 58 (10-15-19 @ 12:56)  BMI (kg/m2): 19.9 (11-20-19 @ 09:17), 20.7 (10-15-19 @ 18:48), 20.7 (10-15-19 @ 18:48)    [x] PPSV2 < or = to 30% [ ] significant weight loss  [ ] poor nutritional intake  [ ] anasarca     Albumin, Serum: 3.0 g/dL (11-20-19 @ 11:42)  Artificial Nutrition [ ]     REFERRALS:   [ ]Chaplaincy  [ ] Hospice  [ ]Child Life  [ ]Social Work  [ ]Case management [ ]Holistic Therapy     Goals of Care Document: HPI:  86 yo male pmhx HTN, DM, Lung CA w/ mets to lung, CML currently on chemo last dose early 11/2019 presents to the ED after a fall at home this morning, approx 530.  Pt was feeling weak once he got out of bed, fell to ground, no LOC as per wife, states he called out from where he fell. Wife found him on the ground, +injury to right side of head without other signs of overt trauma. Patient was initially able to speak and walk after the fall. While in the ED patient's mental status drastically declined. Patient is AOx3 at baseline, ambulatory, and was making jokes earlier this morning. Patient is currently obtunded, nonverbal, and without any purposeful movement.    PERTINENT PM/SXH:   Chronic myelomonocytic leukemia not having achieved remission  Lung cancer  High cholesterol  HTN (hypertension)  Diabetes mellitus    No significant past surgical history    FAMILY HISTORY:  No pertinent family history in first degree relatives    ITEMS NOT CHECKED ARE NOT PRESENT    SOCIAL HISTORY:   Significant other/partner:  [x]  Children:  [x]  Sabianism/Spirituality: Tenriism but states not Confucianism  Substance hx:  Never smoker  Home Opioid hx: Not on controlled substances  [ ] I-Stop Reference No:  Living Situation: [x]Home  [ ]Long term care  [ ]Rehab [ ]Other    ADVANCE DIRECTIVES:    DNR  MOLST  [x]  Living Will  [ ]   DECISION MAKER(s):  [x] Health Care Proxy(s): wife  [ ] Surrogate(s)  [ ] Guardian           Name(s): Maddie Varela Phone Number(s): [phone]    BASELINE (I)ADL(s) (prior to admission):  [place]: [x]Total  [ ] Moderate [ ]Dependent    Allergies    No Known Allergies    Intolerances    MEDICATIONS  (STANDING):    MEDICATIONS  (PRN):    PRESENT SYMPTOMS: [x]Unable to obtain due to poor mentation   Source if other than patient:  [ ]Family   [ ]Team     Pain: [ ] yes [ ] no  QOL impact -   Location -                    Aggravating factors -  Quality -  Radiation -  Timing-  Severity (0-10 scale):  Minimal acceptable level (0-10 scale):     PAIN AD Score: 1    [email or web address]://[email or web address]/[email or web address].pdf (press ctrl +  left click to view)    Dyspnea:                           [x]Mild [ ]Moderate [ ]Severe  Anxiety:                             [ ]Mild [ ]Moderate [ ]Severe  Fatigue:                             [ ]Mild [ ]Moderate [ ]Severe  Nausea:                             [ ]Mild [ ]Moderate [ ]Severe  Loss of appetite:              [ ]Mild [ ]Moderate [ ]Severe  Constipation:                    [ ]Mild [ ]Moderate [ ]Severe    Other Symptoms:  [x]All other review of systems negative     Palliative Performance Status Version 2:      10   %    [email or web address]  PHYSICAL EXAM:  Vital Signs Last 24 Hrs  T(C): 36.6 (20 Nov 2019 09:35), Max: 36.6 (20 Nov 2019 09:35)  T(F): 97.8 (20 Nov 2019 09:35), Max: 97.8 (20 Nov 2019 09:35)  HR: 84 (20 Nov 2019 09:35) (82 - 84)  BP: 125/67 (20 Nov 2019 09:35) (125/67 - 139/82)  BP(mean): --  RR: 16 (20 Nov 2019 09:35) (16 - 16)  SpO2: 99% (20 Nov 2019 09:35) (99% - 100%) I&O's Summary  GENERAL:  [ ]Alert  [ ]Oriented x   [ ]Lethargic  [ ]Cachexia  [x]Unarousable  [ ]Verbal  [x]Non-Verbal  Behavioral:   [ ] Anxiety  [x] Delirium [ ] Agitation [ ] Other  HEENT:  [ ]Normal   [x]Dry mouth   [ ]ET Tube/Trach  [ ]Oral lesions  PULMONARY:   [x]Clear [ ]Tachypnea  [ ]Audible excessive secretions   [ ]Rhonchi        [ ]Right [ ]Left [ ]Bilateral  [ ]Crackles        [ ]Right [ ]Left [ ]Bilateral  [ ]Wheezing     [ ]Right [ ]Left [ ]Bilatera  [ ]Diminished breath sounds [ ]right [ ]left [ ]bilateral  CARDIOVASCULAR:    [x]Regular [ ]Irregular [ ]Tachy  [ ]Alexander [ ]Murmur [ ]Other  GASTROINTESTINAL:  [x]Soft  [ ]Distended   [x]+BS  [x]Non tender [ ]Tender  [ ]PEG [ ]OGT/ NGT  Last BM:  GENITOURINARY:  [ ]Normal [x] Incontinent   [ ]Oliguria/Anuria   [ ]Smith  MUSCULOSKELETAL:   [ ]Normal   [ ]Weakness  [x]Bed/Wheelchair bound [ ]Edema  NEUROLOGIC:   [ ]No focal deficits  [ ] Cognitive impairment  [ ] Dysphagia [ ]Dysarthria [ ] Paresis [x]Other: pupils equally blown and fixed, unarousable, nonverbal, no purposeful movement  SKIN:   [ ]Normal    [ ]Rash  [ ]Pressure ulcer(s) [ ]y [ ]n present on admission  [x] old scattered ecchymoses of arms    CRITICAL CARE:  [ ] Shock Present  [ ]Septic [ ]Cardiogenic [ ]Neurologic [ ]Hypovolemic  [ ]  Vasopressors [ ]  Inotropes   [ ] Respiratory failure present [ ] mechanical ventilation [ ] non-invasive ventilatory support [ ] High flow  [ ] Acute  [ ] Chronic [ ] Hypoxic  [ ] Hypercarbic [ ] Other  [ ] Other organ failure     LABS:                        7.6    1.99  )-----------( 9        ( 20 Nov 2019 11:42 )             22.5   11-20    134<L>  |  96  |  34<H>  ----------------------------<  167<H>  4.2   |  22  |  1.67<H>    Ca    9.4      20 Nov 2019 11:42    TPro  5.9<L>  /  Alb  3.0<L>  /  TBili  1.1  /  DBili  x   /  AST  22  /  ALT  22  /  AlkPhos  132<H>  11-20  PT/INR - ( 20 Nov 2019 11:42 )   PT: 18.0 sec;   INR: 1.54 ratio         PTT - ( 20 Nov 2019 11:42 )  PTT:34.6 sec      RADIOLOGY & ADDITIONAL STUDIES:  < from: CT Head No Cont (11.20.19 @ 12:02) >  CLINICAL INDICATION: Fall, trauma    5mm axial sections of the brain were obtained from base to vertex,   without the intravenous administration ofcontrast material. Coronal and   sagittal computer generated reconstructed views are available.    Comparison is made with the prior MRI of 8/29/2019.    A large acute left frontal parietal holohemispheric subdural hematoma is   identified with its greatest depth measuring 2 cm. There is   interhemispheric extension. There is mass effect on the left lateral   ventricle and significant midline shift to the right of 1.7 cm. The right   lateral ventricle is dilated. The left lateral ventricle is compressed.   There is mild effacement of the left suprasellar cistern and left   perimesencephalic cistern.    There has been previous bilateral lens replacement surgery.        IMPRESSION: Large acute left subdural hematoma with mass effect and   significant midline shift to the right. Signs of early herniation with   suprasellar cistern effacement and effacement of the left   perimesencephalic cistern.    < end of copied text >    PROTEIN CALORIE MALNUTRITION PRESENT:     Height (cm): 170.18 (11-20-19 @ 09:17), 167.5 (10-15-19 @ 18:48), 167.64 (09-22-19 @ 22:37)  Weight (kg): 57.6 (11-20-19 @ 09:17), 58 (10-15-19 @ 18:48), 58 (10-15-19 @ 12:56)  BMI (kg/m2): 19.9 (11-20-19 @ 09:17), 20.7 (10-15-19 @ 18:48), 20.7 (10-15-19 @ 18:48)    [x] PPSV2 < or = to 30% [ ] significant weight loss  [ ] poor nutritional intake  [ ] anasarca     Albumin, Serum: 3.0 g/dL (11-20-19 @ 11:42)  Artificial Nutrition [ ]     REFERRALS:   [ ]Chaplaincy  [ ] Hospice  [ ]Child Life  [ ]Social Work  [ ]Case management [ ]Holistic Therapy     Goals of Care Document: HPI:  86 yo male pmhx HTN, DM, Lung CA w/ mets to lung, CML currently on chemo last dose early 11/2019 presents to the ED after a fall at home this morning, approx 530.  Pt was feeling weak once he got out of bed, fell to ground, no LOC as per wife, states he called out from where he fell. Wife found him on the ground, +injury to right side of head without other signs of overt trauma. Patient was initially able to speak and walk after the fall. While in the ED patient had progressive headache, vomiting, and sharp decline in mental status. Found to have a large acute SDH with midline shift and brain herniation. Patient is AOx3 at baseline, ambulatory, and was making jokes earlier this morning. Patient is currently obtunded, nonverbal, and without any purposeful movement.    PERTINENT PM/SXH:   Chronic myelomonocytic leukemia not having achieved remission  Lung cancer  High cholesterol  HTN (hypertension)  Diabetes mellitus    No significant past surgical history    FAMILY HISTORY:  No pertinent family history in first degree relatives    ITEMS NOT CHECKED ARE NOT PRESENT    SOCIAL HISTORY:   Significant other/partner:  [x]  Children:  [x]  Church/Spirituality: Orthodox but states not Christian  Substance hx:  Never smoker  Home Opioid hx: Not on controlled substances  [ ] I-Stop Reference No:  Living Situation: [x]Home  [ ]Long term care  [ ]Rehab [ ]Other    ADVANCE DIRECTIVES:    DNR  MOLST  [x]  Living Will  [ ]   DECISION MAKER(s):  [x] Health Care Proxy(s): wife  [ ] Surrogate(s)  [ ] Guardian           Name(s): Maddie Varela Phone Number(s): [phone]    BASELINE (I)ADL(s) (prior to admission):  [place]: [x]Total  [ ] Moderate [ ]Dependent    Allergies    No Known Allergies    Intolerances    MEDICATIONS  (STANDING):    MEDICATIONS  (PRN):    PRESENT SYMPTOMS: [x]Unable to obtain due to poor mentation   Source if other than patient:  [ ]Family   [ ]Team     Pain: [ ] yes [ ] no  QOL impact -   Location -                    Aggravating factors -  Quality -  Radiation -  Timing-  Severity (0-10 scale):  Minimal acceptable level (0-10 scale):     PAIN AD Score: 1    http://geriatrictoolkit.[place].[email or web address]/cog/painad.pdf (press ctrl +  left click to view)    Dyspnea:                           [x]Mild [ ]Moderate [ ]Severe  Anxiety:                             [ ]Mild [ ]Moderate [ ]Severe  Fatigue:                             [ ]Mild [ ]Moderate [ ]Severe  Nausea:                             [ ]Mild [ ]Moderate [ ]Severe  Loss of appetite:              [ ]Mild [ ]Moderate [ ]Severe  Constipation:                    [ ]Mild [ ]Moderate [ ]Severe    Other Symptoms:  [x]All other review of systems negative     Palliative Performance Status Version 2:      10   %    [email or web address]  PHYSICAL EXAM:  Vital Signs Last 24 Hrs  T(C): 36.6 (20 Nov 2019 09:35), Max: 36.6 (20 Nov 2019 09:35)  T(F): 97.8 (20 Nov 2019 09:35), Max: 97.8 (20 Nov 2019 09:35)  HR: 84 (20 Nov 2019 09:35) (82 - 84)  BP: 125/67 (20 Nov 2019 09:35) (125/67 - 139/82)  BP(mean): --  RR: 16 (20 Nov 2019 09:35) (16 - 16)  SpO2: 99% (20 Nov 2019 09:35) (99% - 100%) I&O's Summary  GENERAL:  [ ]Alert  [ ]Oriented x   [ ]Lethargic  [ ]Cachexia  [x]Unarousable  [ ]Verbal  [x]Non-Verbal  Behavioral:   [ ] Anxiety  [x] Delirium [ ] Agitation [ ] Other  HEENT:  [ ]Normal   [x]Dry mouth   [ ]ET Tube/Trach  [ ]Oral lesions  PULMONARY:   [x]Clear [ ]Tachypnea  [ ]Audible excessive secretions   [ ]Rhonchi        [ ]Right [ ]Left [ ]Bilateral  [ ]Crackles        [ ]Right [ ]Left [ ]Bilateral  [ ]Wheezing     [ ]Right [ ]Left [ ]Bilatera  [ ]Diminished breath sounds [ ]right [ ]left [ ]bilateral  CARDIOVASCULAR:    [x]Regular [ ]Irregular [ ]Tachy  [ ]Alexander [ ]Murmur [ ]Other  GASTROINTESTINAL:  [x]Soft  [ ]Distended   [x]+BS  [x]Non tender [ ]Tender  [ ]PEG [ ]OGT/ NGT  Last BM:  GENITOURINARY:  [ ]Normal [x] Incontinent   [ ]Oliguria/Anuria   [ ]Smith  MUSCULOSKELETAL:   [ ]Normal   [ ]Weakness  [x]Bed/Wheelchair bound [ ]Edema  NEUROLOGIC:   [ ]No focal deficits  [ ] Cognitive impairment  [ ] Dysphagia [ ]Dysarthria [ ] Paresis [x]Other: pupils equally blown and fixed, unarousable, nonverbal, no purposeful movement  SKIN:   [ ]Normal    [ ]Rash  [ ]Pressure ulcer(s) [ ]y [ ]n present on admission  [x] old scattered ecchymoses of arms    CRITICAL CARE:  [ ] Shock Present  [ ]Septic [ ]Cardiogenic [ ]Neurologic [ ]Hypovolemic  [ ]  Vasopressors [ ]  Inotropes   [ ] Respiratory failure present [ ] mechanical ventilation [ ] non-invasive ventilatory support [ ] High flow  [ ] Acute  [ ] Chronic [ ] Hypoxic  [ ] Hypercarbic [ ] Other  [ ] Other organ failure     LABS:                        7.6    1.99  )-----------( 9        ( 20 Nov 2019 11:42 )             22.5   11-20    134<L>  |  96  |  34<H>  ----------------------------<  167<H>  4.2   |  22  |  1.67<H>    Ca    9.4      20 Nov 2019 11:42    TPro  5.9<L>  /  Alb  3.0<L>  /  TBili  1.1  /  DBili  x   /  AST  22  /  ALT  22  /  AlkPhos  132<H>  11-20  PT/INR - ( 20 Nov 2019 11:42 )   PT: 18.0 sec;   INR: 1.54 ratio         PTT - ( 20 Nov 2019 11:42 )  PTT:34.6 sec      RADIOLOGY & ADDITIONAL STUDIES:  < from: CT Head No Cont (11.20.19 @ 12:02) >  CLINICAL INDICATION: Fall, trauma    5mm axial sections of the brain were obtained from base to vertex,   without the intravenous administration ofcontrast material. Coronal and   sagittal computer generated reconstructed views are available.    Comparison is made with the prior MRI of 8/29/2019.    A large acute left frontal parietal holohemispheric subdural hematoma is   identified with its greatest depth measuring 2 cm. There is   interhemispheric extension. There is mass effect on the left lateral   ventricle and significant midline shift to the right of 1.7 cm. The right   lateral ventricle is dilated. The left lateral ventricle is compressed.   There is mild effacement of the left suprasellar cistern and left   perimesencephalic cistern.    There has been previous bilateral lens replacement surgery.        IMPRESSION: Large acute left subdural hematoma with mass effect and   significant midline shift to the right. Signs of early herniation with   suprasellar cistern effacement and effacement of the left   perimesencephalic cistern.    < end of copied text >    PROTEIN CALORIE MALNUTRITION PRESENT:     Height (cm): 170.18 (11-20-19 @ 09:17), 167.5 (10-15-19 @ 18:48), 167.64 (09-22-19 @ 22:37)  Weight (kg): 57.6 (11-20-19 @ 09:17), 58 (10-15-19 @ 18:48), 58 (10-15-19 @ 12:56)  BMI (kg/m2): 19.9 (11-20-19 @ 09:17), 20.7 (10-15-19 @ 18:48), 20.7 (10-15-19 @ 18:48)    [x] PPSV2 < or = to 30% [ ] significant weight loss  [ ] poor nutritional intake  [ ] anasarca     Albumin, Serum: 3.0 g/dL (11-20-19 @ 11:42)  Artificial Nutrition [ ]     REFERRALS:   [ ]Chaplaincy  [ ] Hospice  [ ]Child Life  [ ]Social Work  [ ]Case management [ ]Holistic Therapy     Goals of Care Document: HPI:  86 yo male pmhx HTN, DM, Lung CA w/ mets to lung, CML currently on chemo last dose early 11/2019 presents to the ED after a fall at home this morning, approx 530.  Pt was feeling weak once he got out of bed, fell to ground, no LOC as per wife, states he called out from where he fell. Wife found him on the ground, +injury to right side of head without other signs of overt trauma. Patient was initially able to speak and walk after the fall. While in the ED patient had progressive headache, vomiting, and sharp decline in mental status. Found to have a large acute SDH with midline shift and brain herniation. Patient is AOx3 at baseline, ambulatory, and was making jokes earlier this morning. Patient is currently obtunded, nonverbal, and without any purposeful movement.    PERTINENT PM/SXH:   Chronic myelomonocytic leukemia not having achieved remission  Lung cancer  High cholesterol  HTN (hypertension)  Diabetes mellitus    No significant past surgical history    FAMILY HISTORY:  No pertinent family history in first degree relatives    ITEMS NOT CHECKED ARE NOT PRESENT    SOCIAL HISTORY:   Significant other/partner:  [x]  Children:  [x]  Sabianism/Spirituality: Nondenominational  Substance hx:  Never smoker  Home Opioid hx: Not on controlled substances  [ ] I-Stop Reference No:  Living Situation: [x]Home  [ ]Long term care  [ ]Rehab [ ]Other    ADVANCE DIRECTIVES:    DNR  MOLST  [x]  Living Will  [ ]   DECISION MAKER(s):  [x] Health Care Proxy(s): wife  [ ] Surrogate(s)  [ ] Guardian           Name(s): Maddie Varela Phone Number(s): [phone]    BASELINE (I)ADL(s) (prior to admission):  [place]: [x]Total  [ ] Moderate [ ]Dependent    Allergies    No Known Allergies    Intolerances    MEDICATIONS  (STANDING):    MEDICATIONS  (PRN):    PRESENT SYMPTOMS: [x]Unable to obtain due to poor mentation   Source if other than patient:  [ ]Family   [ ]Team     Pain: [ ] yes [ ] no  QOL impact -   Location -                    Aggravating factors -  Quality -  Radiation -  Timing-  Severity (0-10 scale):  Minimal acceptable level (0-10 scale):     PAIN AD Score: 1    [email or web address].[email or web address]/cog/painad.pdf (press ctrl +  left click to view)    Dyspnea:                           [x]Mild [ ]Moderate [ ]Severe  Anxiety:                             [ ]Mild [ ]Moderate [ ]Severe  Fatigue:                             [ ]Mild [ ]Moderate [ ]Severe  Nausea:                             [ ]Mild [ ]Moderate [ ]Severe  Loss of appetite:              [ ]Mild [ ]Moderate [ ]Severe  Constipation:                    [ ]Mild [ ]Moderate [ ]Severe    Other Symptoms:  [x]All other review of systems negative     Palliative Performance Status Version 2:      10   %    [email or web address]  PHYSICAL EXAM:  Vital Signs Last 24 Hrs  T(C): 36.6 (20 Nov 2019 09:35), Max: 36.6 (20 Nov 2019 09:35)  T(F): 97.8 (20 Nov 2019 09:35), Max: 97.8 (20 Nov 2019 09:35)  HR: 84 (20 Nov 2019 09:35) (82 - 84)  BP: 125/67 (20 Nov 2019 09:35) (125/67 - 139/82)  BP(mean): --  RR: 16 (20 Nov 2019 09:35) (16 - 16)  SpO2: 99% (20 Nov 2019 09:35) (99% - 100%) I&O's Summary  GENERAL:  [ ]Alert  [ ]Oriented x   [ ]Lethargic  [ ]Cachexia  [x]Unarousable  [ ]Verbal  [x]Non-Verbal  Behavioral:   [ ] Anxiety  [x] Delirium [ ] Agitation [ ] Other  HEENT:  [ ]Normal   [x]Dry mouth   [ ]ET Tube/Trach  [ ]Oral lesions  PULMONARY:   [x]Clear [ ]Tachypnea  [ ]Audible excessive secretions   [ ]Rhonchi        [ ]Right [ ]Left [ ]Bilateral  [ ]Crackles        [ ]Right [ ]Left [ ]Bilateral  [ ]Wheezing     [ ]Right [ ]Left [ ]Bilatera  [ ]Diminished breath sounds [ ]right [ ]left [ ]bilateral  CARDIOVASCULAR:    [x]Regular [ ]Irregular [ ]Tachy  [ ]Alexander [ ]Murmur [ ]Other  GASTROINTESTINAL:  [x]Soft  [ ]Distended   [x]+BS  [x]Non tender [ ]Tender  [ ]PEG [ ]OGT/ NGT  Last BM:  GENITOURINARY:  [ ]Normal [x] Incontinent   [ ]Oliguria/Anuria   [ ]Smith  MUSCULOSKELETAL:   [ ]Normal   [ ]Weakness  [x]Bed/Wheelchair bound [ ]Edema  NEUROLOGIC:   [ ]No focal deficits  [ ] Cognitive impairment  [ ] Dysphagia [ ]Dysarthria [ ] Paresis [x]Other: pupils equally blown and fixed, unarousable, nonverbal, no purposeful movement  SKIN:   [ ]Normal    [ ]Rash  [ ]Pressure ulcer(s) [ ]y [ ]n present on admission  [x] old scattered ecchymoses of arms    CRITICAL CARE:  [ ] Shock Present  [ ]Septic [ ]Cardiogenic [ ]Neurologic [ ]Hypovolemic  [ ]  Vasopressors [ ]  Inotropes   [ ] Respiratory failure present [ ] mechanical ventilation [ ] non-invasive ventilatory support [ ] High flow  [ ] Acute  [ ] Chronic [ ] Hypoxic  [ ] Hypercarbic [ ] Other  [ ] Other organ failure     LABS:                        7.6    1.99  )-----------( 9        ( 20 Nov 2019 11:42 )             22.5   11-20    134<L>  |  96  |  34<H>  ----------------------------<  167<H>  4.2   |  22  |  1.67<H>    Ca    9.4      20 Nov 2019 11:42    TPro  5.9<L>  /  Alb  3.0<L>  /  TBili  1.1  /  DBili  x   /  AST  22  /  ALT  22  /  AlkPhos  132<H>  11-20  PT/INR - ( 20 Nov 2019 11:42 )   PT: 18.0 sec;   INR: 1.54 ratio         PTT - ( 20 Nov 2019 11:42 )  PTT:34.6 sec      RADIOLOGY & ADDITIONAL STUDIES:  < from: CT Head No Cont (11.20.19 @ 12:02) >  CLINICAL INDICATION: Fall, trauma    5mm axial sections of the brain were obtained from base to vertex,   without the intravenous administration ofcontrast material. Coronal and   sagittal computer generated reconstructed views are available.    Comparison is made with the prior MRI of 8/29/2019.    A large acute left frontal parietal holohemispheric subdural hematoma is   identified with its greatest depth measuring 2 cm. There is   interhemispheric extension. There is mass effect on the left lateral   ventricle and significant midline shift to the right of 1.7 cm. The right   lateral ventricle is dilated. The left lateral ventricle is compressed.   There is mild effacement of the left suprasellar cistern and left   perimesencephalic cistern.    There has been previous bilateral lens replacement surgery.        IMPRESSION: Large acute left subdural hematoma with mass effect and   significant midline shift to the right. Signs of early herniation with   suprasellar cistern effacement and effacement of the left   perimesencephalic cistern.    < end of copied text >    PROTEIN CALORIE MALNUTRITION PRESENT:     Height (cm): 170.18 (11-20-19 @ 09:17), 167.5 (10-15-19 @ 18:48), 167.64 (09-22-19 @ 22:37)  Weight (kg): 57.6 (11-20-19 @ 09:17), 58 (10-15-19 @ 18:48), 58 (10-15-19 @ 12:56)  BMI (kg/m2): 19.9 (11-20-19 @ 09:17), 20.7 (10-15-19 @ 18:48), 20.7 (10-15-19 @ 18:48)    [x] PPSV2 < or = to 30% [ ] significant weight loss  [ ] poor nutritional intake  [ ] anasarca     Albumin, Serum: 3.0 g/dL (11-20-19 @ 11:42)  Artificial Nutrition [ ]     REFERRALS:   [ ]Chaplaincy  [ ] Hospice  [ ]Child Life  [ ]Social Work  [ ]Case management [ ]Holistic Therapy     Goals of Care Document:

## 2019-11-20 NOTE — CONSULT NOTE ADULT - CONSULT REASON
PCU eval for large acute left SDH with midline shift and brain herniation causing obtundation; goal full comfort care

## 2019-11-20 NOTE — CHART NOTE - NSCHARTNOTEFT_GEN_A_CORE
EMERGENCY : LMSW responded to level 1 trauma activation in ED. As per medical team patient came in s/p fall at home this morning. Medical team states patient has a large brain bleed with a poor prognosis. LMSW met with patient’s wife at bedside and introduced self and role to which patient’s wife verbalized understanding. LMSW accompanied Neurology team and attending MD to family waiting area to discuss patient’s prognosis with his wife as well as code status. Patient unable to communicate due to condition at this time. Patient’s wife states that patient has been battling cancer and has had a decreased quality of life over time. Patient’s wife also shared that recently, they discussed what the patient’s wishes would be should he have a poor prognosis. Patient’s wife states that the patient has made it clear that he would not want to be intubated or resuscitated should he get sicker. As per patient’s wife patient completed paperwork with a  a few weeks ago documenting his decisions but patient’s wife does not have this paperwork on hand at the moment. Patient’s wife states that she would like to speak with their son as well as their  before completing any paperwork. Patient’s wife spoke with  and son as well as attending MD who also spoke with patient’s son and all are in agreement that patient would not want to be intubated or resuscitated. MOLST form completed and placed in patient’s chart. Patient’s wife is Maddie Lamb and patient’s son is Antony Lamb (P: [phone]). Patient’s wife states that patient has another son from a previous marriage who lives in [facility] that she is in communication with. LMSW and attending MD provided support to patient’s wife at bedside. Patient’s wife appearing to be coping appropriately at this time. Patient’s wife states she just wants her  to be comfortable and peaceful. LMSW provided space for ventilation of feelings. 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 SW will remain available to provide support to patient’s family during this time.

## 2019-11-20 NOTE — CONSULT NOTE ADULT - PROBLEM SELECTOR PROBLEM 4
Jackson Saldana  : 1974 [facility] [place] [address] [address] [facility]  [phone]   [phone]         OUTPATIENT PHYSICAL THERAPY:Daily Note 2017      ICD-10: Treatment Diagnosis:   Lumbago with sciatica, right side (M54.41)       Precautions/Allergies:   Tramadol   Fall Risk Score: 7 (? 5 = High Risk)  MD Orders: Eval and Treat  MEDICAL/REFERRING DIAGNOSIS:  Lumbar radiculopathy [M54.16]   DATE OF ONSET: fall on 11/10/2016  REFERRING PHYSICIAN: Melissa [address] [place] [address] Avenue: 2017     INITIAL ASSESSMENT:  Ms. Jackson Saldana has attended 1 physical therapy session including initial evaluation. Jackson Saldana exhibits decreased flexibility, increased pain, decreased postural and core strength, decreased functional tolerance. Patient demonstrates pelvic malalignment upon initial evaluation with decreased posture and impaired transfer tolerance. Jackson Saldana will benefit from skilled PT (medically necessary) to address above deficits affecting participation in basic ADLs and overall functional tolerance. Manual techniques (stretching, joint mobilizations, soft tissue mobilization/myofascial release), postural exercises/education, therapeutic techniques/activities, and HEP will be performed as appropriate addressing Antonette Liz's current condition. PROBLEM LIST (Impacting functional limitations):  1. Decreased Strength  2. Decreased ADL/Functional Activities  3. Decreased Transfer Abilities  4. Decreased Ambulation Ability/Technique  5. Decreased Balance  6. Increased Pain  7. Decreased Activity Tolerance  8. Decreased Work Simplification/Energy Conservation Techniques  9. Increased Fatigue  10. Decreased Flexibility/Joint Mobility  11. Decreased Knowledge of Precautions  12. Decreased [place] with Home Exercise Program  13.  impaired body mechanics INTERVENTIONS PLANNED:  1. Balance
Pt arrives to outpatient treatment room. Old dressing removed for small amount of serosang drainage on dressing. Wound irrigated with NS then loosely packed with antibiotic infused gauze and covered with an ABD secured with paper tape. Pt leaves ambulatory in stable condition.
Exercise  2. Bed Mobility  3. Cold  4. Cryotherapy  5. Electrical Stimulation  6. Family Education  7. Gait Training  8. Heat  9. Home Exercise Program (HEP)  10. Manual Therapy  11. Neuromuscular Re-education/Strengthening  12. Range of Motion (ROM)  13. Therapeutic Activites  14. Therapeutic Exercise/Strengthening  15. Transfer Training   TREATMENT PLAN:  Effective Dates: 1/13/2017 TO 4/13/2017. Frequency/Duration: 2 times a week for 8 weeks  GOALS: (Goals have been discussed and agreed upon with patient.)  Short Term Goals 4 weeks   1. Scooby Nunez will be independent with HEP  2. Scooby Nunez will participate in LE stretching program to increase flexibility  3. Scooby Nunez will participate in core stabilization exercises to help with stabilization during ADLs  4. Scooby Nunez will participate in LE strengthening program with weights as appropriate to help with gait and elevations  5. Scooby Nunez will participate in static and dynamic balance activities to decrease the risk for falls  6. Scooby Nunez will tolerate manual therapy/joint mobilizations/soft tissue to increase ROM and decrease pain  7. Patient will demonstrate safe body mechanics with lifting, bending, and transfers for improved lumbar spine pain and activity tolerance. Long Term Goals 8 weeks   1. Scooby Nunez will demonstrate a 20 point improvement on the Oswestry to show improvement in function  2. Scooby Nunez will report 0/10 pain at rest and during ADLs  3. Scooby Nunez will demonstrate 5/5 LE strength on manual muscle testing  4. Scooby Nunez will be able to perform SLS >5 seconds bilaterally to help with gait and improve balance  Rehabilitation Potential For Stated Goals: Good  Regarding Antonette Liz's therapy, I certify that the treatment plan above will be carried out by a therapist or under their direction.   Thank you for this referral,  Brittany Cruz, PT     Referring Physician
Signature: Stephanie Mueller*              Date                    HISTORY:    History of Present Injury/Illness (Reason for Referral):  Ms. Reyna Eason reports getting her foot caught between boxes in a freezer at work and falling backwards into a metal rack and boxes on 11/10/2016. She reports immediate pain at that time that has continued and is increased with walking, getting up from the bed, up from a chair, and with sitting greater than 15 minutes. She reports that her pain is right sided and radiates into the right foot. She also reports that she has difficulty with picking any bending, twisting, and lifting. MRI imaging demonstrated no discal injury and no significant structural damage to the lumbar tissue. Patient goals are to return to work and to be safe with back movement for prevention of re-injury or further injury. Patient reports that symptoms have not changed since injury. Past Medical History/Comorbidities:   Ms. Reyna Eason  has a past medical history of Hypothyroid. Ms. Reyna Eason  has a past surgical history that includes hysterectomy and cholecystectomy. Social History/Living Environment:     lives in private residence with family  Prior Level of Function/Work/Activity:  Patient worked and was independent with all home and work duties prior to injury  Note: Patient denies any increase of symptoms with cough, sneeze or valsalva. Patient denies any saddle paresthesia or bowel/bladder deficits. Personal Factors:          Coping Style:  Seems to catastrophize injury        Social Background:  Some indications of family stress        Profession:          Past/Current Experience:  No previous history of back pain        Other factors that influence how disability is experienced by the patient:  Previous high level family stress due to loss of spouse in traumatic event.   Current Medications:    Current Outpatient Prescriptions:     diclofenac EC (VOLTAREN) 75 mg EC tablet, Take
1 Tab by mouth two (2) times a day., Disp: 10 Tab, Rfl: 0    levothyroxine (SYNTHROID) 75 mcg tablet, Take  by mouth Daily (before breakfast). , Disp: , Rfl:    Flexeril  norco  Ibeuprophen    Date Last Reviewed:  2/1/2017    Number of Personal Factors/Comorbidities that affect the Plan of Care:    EXAMINATION:    Observation/Orthostatic Postural Assessment:          Antalgic gait pattern with decreased right LE loading during gait. Right trunk lean in standing. Palpation:          Elevated right psis in standing and sitting. Tenderness with palpation of right quadratus lumborum and right piriformis  ROM:            AROM/PROM         Joint: Eval Date: 11/13/2106 Re-Assess Date:  Re-Assess Date:     RIGHT LEFT RIGHT LEFT RIGHT LEFT   Knee Extension         Knee Flexion         Hip Flexion         Hip Abduction         Lumbar Flexion 50% with Pain        Lumbar Extension 50% with pain        Lumbar Side-bending 50% with pain 50%with pain       Lumbar Rotation           Strength:    Joint: Eval Date:  Re-Assess Date:  Re-Assess Date:     RIGHT LEFT RIGHT LEFT RIGHT LEFT   Knee Flexion 5/5 5/5       Knee Extension 5/5 5/5       Hip Flexion 4/5 5/5       Hip Abduction 4/5 5/5       Ankle Dorsiflexion 5/5 5/5                           Single leg stance right/left: needed support on right              Deep squat: unable due to Right LE pain and c/o weakness    Ham 90/90:   RIGHT LE: -40   LEFT LE: -20    Special Tests: Assessed @ Initial Visit    ISRRAEL 4: + on right   SLUMP TEST: +on right  Neurological Screen: Assessed @ Initial Visit    RADIATING SYMPTOMS?: YES  Functional Mobility:  Assessed @ Initial Visit Affecting participation in basic ADLs and functional tasks.   Poor spinal mechanics with supine-sit, sit-stand, and picking up objects that demonstrated increased spinal flexion and discal loading    Balance:          Poor stability on right LE    Body Structures
Involved:  1. Nerves  2. Joints  3. Muscles  4. Ligaments 5. Body Functions Affected:  1. Mental  2. Sensory/Pain  3. Neuromusculoskeletal  4. Movement Related  5. postural awareness Activities and Participation Affected:  1. General Tasks and Demands  2. Mobility  3. Self Care  4. Domestic Life  5. Interpersonal Interactions and Relationships  6. Community, Social and [place]  7. work duties   Number of elements that affect the Plan of Care:    CLINICAL PRESENTATION:    Presentation:  Evolving clinical presentation with changing clinical characteristics: MODERATE COMPLEXITY   CLINICAL DECISION MAKING:    Outcome Measure: Tool Used: Modified Oswestry Low Back Pain Questionnaire  Score:  Initial: 35/50  Most Recent: X/50 (Date: -- )   Interpretation of Score: Each section is scored on a 0-5 scale, 5 representing the greatest disability. The scores of each section are added together for a total score of 50. Score 0 1-10 11-20 21-30 31-40 41-49 50   Modifier CH CI CJ CK CL CM CN       Medical Necessity:   · Skilled intervention continues to be required due to above deficits affecting participation in basic ADLs and overall functional tolerance. Reason for Services/Other Comments:  · Patient continues to require skilled intervention due to  above deficits affecting participation in basic ADLs and overall functional tolerance. ·    Use of outcome tool(s) and clinical judgement create a POC that gives a:    TREATMENT:    (In addition to Assessment/Re-Assessment sessions the following treatments were rendered)  THERAPEUTIC EXERCISE: (40 minutes):  Exercises per grid below to improve mobility, strength and balance. Required minimal visual and verbal cues to promote proper body alignment and promote proper body posture. Progressed resistance, range and complexity of movement as indicated.      Date:  1/30/2017 Date:  2/1/2017   Activity/Exercise     Sciatic nerve mobilization     Prone lying for decreased lumbar
loading     Patient education on body mechanics     physiostep x10' w/ moist hot pack to lumbar spine x10' w/ moist hot pack to lumbar spine   Lower trunk rotation 3x10    Supine piriformis stretch 2x30\" 3x30\" figure 4   Bridge with adduction     Sidelying clams     Balance board x2' in each direction with verbal cueing for core bracing x2' in each direction with verbal cueing for core bracing   Tandem stance On a/x 3x1'ea On a/x 3x1'ea   Patient education on self SI mobilization     Walking lunge stretch 2x10    Patient education on piriformis soft tissue mobilization with tennis ball  x5'   Side steps/monster walks  rtb 2x20\" ea direction         MANUAL THERAPY: ( 0 minutes): Joint mobilization to right sacroiliac junction, Soft tissue mobilization and Manipulation was utilized and necessary because of the patient's restricted joint motion, painful spasm, restricted motion of soft tissue. Soft tissue mobilization to right thoracolumbar paraspinals and right piriformis. (Used abbreviations: MET - muscle energy technique; PNF - proprioceptive neuromuscular facilitation; NMR - neuromuscular re-education; AP - anterior to posterior; PA - posterior to anterior)    MODALITIES: (0 minutes):      *  Cold Pack Therapy in order to provide analgesia, relieve muscle spasm and reduce inflammation and edema. No abnormal skin reaction present. Pre-treatment report: Patient reports continued pain and fatigue with some improving standing tolerance. Treatment/Session Assessment:  Patient tolerated new exercises with no increased symptoms today. · Pain/ Symptoms: Initial:   7/10 Post Session: 5/10 ·   Compliance with Program/Exercises: Will assess as treatment progresses. · Recommendations/Intent for next treatment session: \"Next visit will focus on advancements to more challenging activities\".   Total Treatment Duration:  PT Patient Time In/Time Out  Time In: 1015  Time Out: [address], 
Metastatic lung carcinoma

## 2019-11-20 NOTE — CONSULT NOTE ADULT - PROBLEM SELECTOR RECOMMENDATION 4
No further meds as discussed with family  - full comfort care No further meds as discussed with family  - comfort care as discussed above No further meds or work up as discussed with family

## 2019-11-20 NOTE — ED ADULT NURSE NOTE - NSIMPLEMENTINTERV_GEN_ALL_ED
Implemented All Fall Risk Interventions:  [place] to call system. Call bell, personal items and telephone within reach. Instruct patient to call for assistance. Room bathroom lighting operational. Non-slip footwear when patient is off stretcher. Physically safe environment: no spills, clutter or unnecessary equipment. Stretcher in lowest position, wheels locked, appropriate side rails in place. Provide visual cue, wrist band, yellow gown, etc. Monitor gait and stability. Monitor for mental status changes and reorient to person, place, and time. Review medications for side effects contributing to fall risk. Reinforce activity limits and safety measures with patient and family.

## 2019-11-20 NOTE — H&P ADULT - HISTORY OF PRESENT ILLNESS
85 year old male with PMH of HTN, DM, metastatic lung ca, CML currently on chemotherapy with last cycle 11/2019 who presents to the ED after a fall in his home. Patient's wife bedside states patient was feeling weak and fell while trying to get out of bed this morning without LOC. Patient called out to his wife, who helped him get up from the floor and brought him here to the ED for further evaluation. Upon initial arrival, patient was alert and interactive, but with progressive decline while in the ED. CT Head showing large L subdurla hematoma with mass effect and significant midline shift with early signs of herniation. Seen by Palliative Care team in the ED; patient's wife (HCP) and son agreeing to full comfort care measures at this time. MOLST completed and placed in chart.

## 2019-11-20 NOTE — CONSULT NOTE ADULT - PROBLEM SELECTOR RECOMMENDATION 6
No further meds as discussed with family  - full comfort care No further meds as discussed with family  - comfort care as discussed above

## 2019-11-20 NOTE — CONSULT NOTE ADULT - ASSESSMENT
85y M w/ HTN, DM, metastatic lung CA, CML who was on chemo 85y M w/ HTN, DM, metastatic lung CA, CML, presenting after fall at home, found to have a large acute left SDH with mass effect and significant 1.7cm midline shift and signs of early herniation. Patient's wife/HCP and son present at bedside on admission and in full agreement for DNR/DNI with full comfort care, no blood draws, no meds, no tube feeds. 85y M w/ HTN, DM, metastatic lung CA, CML, presenting after fall at home, found to have a large acute left SDH with mass effect and significant 1.7cm midline shift and signs of early herniation. Patient's wife/HCP and son present at bedside on admission and in full agreement for DNR/DNI with full comfort care, no blood draws, no home meds, no tube feeds. 85y M w/ HTN, DM, metastatic lung CA, CML, presenting after fall at home, found to have a large acute left SDH with mass effect and significant 1.7cm midline shift and signs of early herniation. Patient's wife/HCP and son present at bedside on admission and in full agreement for DNR/DNI with comfort care, no blood draws, no home meds, no tube feeds.

## 2019-11-20 NOTE — CONSULT NOTE ADULT - PROBLEM SELECTOR RECOMMENDATION 9
Acute obtundation in setting of fall on 11/20/19 with head trauma and subsequent large acute left SDH with mass effect and significant 1.7cm midline shift and signs of early herniation after fall. Patient remains obtunded, unarousable, nonverbal, not following any commands.  - patient's wife/HCP and son present at bedside on admission in full agreement for full comfort care  - As documented Patient’s wife states that patient has been battling cancer and has had a decreased quality of life over time. Patient’s wife also shared that recently, they discussed what the patient’s wishes would be should he have a poor prognosis. Patient’s wife states that the patient has made it clear that he would not want to be intubated or resuscitated should he get sicker. Acute obtundation in setting of fall on 11/20/19 with head trauma and subsequent large acute left SDH with mass effect and significant 1.7cm midline shift and signs of early herniation after fall. Patient remains obtunded, unarousable, nonverbal, not following any commands.  - spoke with patient's wife/HCP and son present at bedside on admission in full agreement for full comfort care; no other first degree family members  - morphine 1mg IVP q1h PRN for mod-severe pain  - morphine 1mg IVP q1h PRN for labored breathing or RR>28  - ativan 0.5mg IVP q1h PRN for agitation  - robinul 0.4mg IVP q4h PRN for secretions  - ativan 2mg IVP q1 PRN if seizure Acute obtundation in setting of fall on 11/20/19 with head trauma and subsequent large acute left SDH with mass effect and significant 1.7cm midline shift and signs of early herniation after fall. Patient remains obtunded, unarousable, nonverbal, not following any commands.  - spoke with patient's wife/HCP and son present at bedside on admission in full agreement for full comfort care; no other first degree family members  - dilaudid 0.2mg IVP q1h PRN for mod-severe pain  - dilaudid 0.2mg IVP q1h PRN for labored breathing or RR>28  - ativan 0.5mg IVP q1h PRN for agitation  - robinul 0.4mg IVP q4h PRN for secretions  - ativan 2mg IVP q1 PRN if seizure Acute obtundation in setting of fall on 11/20/19 with head trauma and subsequent large acute left SDH with mass effect and significant 1.7cm midline shift and signs of early herniation after fall. Patient remains obtunded, unarousable, nonverbal, not following any commands.  - spoke with patient's wife/HCP and son present at bedside on admission in full agreement for full comfort care; no other first degree family members  - PCU notified of patient; patient is on the board for a bed in the PCU; transfer to PCU once a bed becomes available  - dilaudid 0.2mg IVP q1h PRN for mod-severe pain  - dilaudid 0.2mg IVP q1h PRN for labored breathing or RR>28  - ativan 0.5mg IVP q1h PRN for agitation  - robinul 0.4mg IVP q4h PRN for secretions  - ativan 2mg IVP q1 PRN if seizure Acute obtundation in setting of fall on 11/20/19 with head trauma and subsequent large acute left SDH with mass effect and significant 1.7cm midline shift and signs of early herniation. Patient remains obtunded, unarousable, nonverbal, not following any commands.  - spoke with patient's wife/HCP and son present at bedside on admission in full agreement for full comfort care; no other first degree family members  - PCU notified of patient; patient is on the board for a bed in the PCU; transfer to PCU once a bed becomes available  - dilaudid 0.2mg IVP q1h PRN for mod-severe pain  - dilaudid 0.2mg IVP q1h PRN for labored breathing or RR>28  - ativan 0.5mg IVP q1h PRN for agitation  - robinul 0.4mg IVP q4h PRN for secretions  - ativan 2mg IVP q1 PRN if seizure Acute obtundation in setting of fall on 11/20/19 with head trauma and subsequent large acute left SDH with mass effect and significant 1.7cm midline shift and signs of early herniation. Patient remains obtunded, unarousable, nonverbal, not following any commands. Seen by trauma and neurosurgery; no surgical intervention.  - spoke with patient's wife/HCP and son present at bedside on admission in full agreement for full comfort care; no other first degree family members  - PCU notified of patient; patient is on the board for a bed in the PCU; transfer to PCU once a bed becomes available  - dilaudid 0.2mg IVP q1h PRN for mod-severe pain  - dilaudid 0.2mg IVP q1h PRN for labored breathing or RR>28  - ativan 0.5mg IVP q1h PRN for agitation  - robinul 0.4mg IVP q4h PRN for secretions  - ativan 2mg IVP q1 PRN if seizure Acute obtundation in setting of fall on 11/20/19 with head trauma and subsequent large acute left SDH with mass effect and significant 1.7cm midline shift and signs of early herniation. Patient remains obtunded, unarousable, nonverbal, not following any commands. Seen by trauma and neurosurgery; no surgical intervention.  - spoke with patient's wife/HCP and son present at bedside on admission in full agreement for goals focused towards preventing and treating any distressful symptoms. No further work up is expected. No other first degree family members  - PCU notified of patient; patient is on the board for a bed in the PCU; transfer to PCU once a bed becomes available  - dilaudid 0.2mg IVP q1h PRN for mod-severe pain  - dilaudid 0.2mg IVP q1h PRN for labored breathing or RR>28  - ativan 0.5mg IVP q1h PRN for agitation  - robinul 0.4mg IVP q4h PRN for secretions  - ativan 2mg IVP q1 PRN if seizure

## 2019-11-20 NOTE — ED ADULT TRIAGE NOTE - CHIEF COMPLAINT QUOTE
fell today in bathroom and now with headache  on chemo for leukemia and lung ca fell today in bathroom and now with headache, has low platelets   on chemo for leukemia and lung ca

## 2019-11-20 NOTE — H&P ADULT - NSHPPHYSICALEXAM_GEN_ALL_CORE
GENERAL: comfortable appearing, not responsive to verbal stimuli  HEAD:  Atraumatic, Normocephalic  EYES: EOMI, conjunctiva and sclera clear  NECK: +upper airway secretions  CHEST/LUNG: +b/l rhonchi and upper airway secretions  HEART: Regular rate and rhythm; No murmurs, rubs, or gallops  ABDOMEN: Soft, Nontender, Nondistended; Bowel sounds present  EXTREMITIES:  2+ Peripheral Pulses, No clubbing, cyanosis, or edema  PSYCH: unable to assess orientation  NEUROLOGY: obtunded, not responsive to verbal stimuli  SKIN: +ecchymoses in upper extremities b/l

## 2019-11-20 NOTE — CONSULT NOTE ADULT - PROBLEM SELECTOR RECOMMENDATION 2
Patient presented after fall at home with head trauma. Patient presented after fall at home with head trauma and subsequent SDH with shift and herniation. Currently signs of overt external trauma requiring wound care.  - full comfort care Patient presented after fall at home with head trauma and subsequent SDH with shift and herniation. Currently no signs of overt external trauma requiring wound care.  - full comfort care Patient presented after fall at home with head trauma and subsequent SDH with shift and herniation. Currently no signs of overt external trauma requiring wound care.  - comfort care as discussed above Patient presented after fall at home with head trauma and subsequent SDH with shift and herniation. Currently no signs of overt external trauma requiring wound care.  - Care as discussed above

## 2019-11-20 NOTE — ED ADULT NURSE REASSESSMENT NOTE - NS ED NURSE REASSESS COMMENT FT1
Patient seen in bed, sleeping. Wife and son at bedside. Awaiting admit to palliative care. Safety and comfort provided.

## 2019-11-20 NOTE — CONSULT NOTE ADULT - PROBLEM SELECTOR RECOMMENDATION 7
As discussed in SW chart note, family reports patient has been having declining quality of life battling his cancer. Wife/HCP Maddie Varela and son in agreement that patient recently expressed that he would not want further resuscitative measures. Wife and son in full agreement for comfort care only. DNR/DNI, MOLST in chart. No further blood draws, no meds, no tube feeds.    Plan discussed with attending Dr. Arnold As discussed in SW chart note, family reports patient has been having declining quality of life battling his cancer. Wife/HCP Maddie Varela and son in agreement that patient recently expressed that he would not want further resuscitative measures. Wife and son in full agreement for comfort care only. DNR/DNI, MOLST in chart. No further blood draws, no home meds, no tube feeds.    Plan discussed with attending Dr. Arnold

## 2019-11-20 NOTE — ED ADULT NURSE REASSESSMENT NOTE - NS ED NURSE REASSESS COMMENT FT1
Patient febrile with a temp of 101.2 rectally. NP made aware. Per NP, patient is to receive IV Tylenol. Report given to CHAGO Arreguin in [facility]. Plan of care discussed and initiated. Patient's family verbalizes understanding.

## 2019-11-20 NOTE — H&P ADULT - NSICDXPASTMEDICALHX_GEN_ALL_CORE_FT
PAST MEDICAL HISTORY:  Chronic myelomonocytic leukemia not having achieved remission     Diabetes mellitus     High cholesterol     HTN (hypertension)     Lung cancer

## 2019-11-20 NOTE — ED PROVIDER NOTE - EXTREMITY EXAM
No obvious deformity all extremities. No bony or muscle ttp. Full AROM UE/LE b/l with 5/5 strength. Sensation intact all extremities, pulses full/equal b/l.

## 2019-11-20 NOTE — ED PROVIDER NOTE - EYES, MLM
L upper eyelid with slight droop (baseline per wife). Eyes are clear, pupils are equal, round and reactive to light and accomodation b/l.

## 2019-11-20 NOTE — ED ADULT NURSE REASSESSMENT NOTE - NS ED NURSE REASSESS COMMENT FT1
CT Tech called states patient is unable to tolerate being flat for the scan - has persistent nausea and has vomited 3 times. Went down to CT and medicated patient with zofran per MD order.

## 2019-11-20 NOTE — CONSULT NOTE ADULT - PROBLEM SELECTOR RECOMMENDATION 3
No further meds as discussed with family  - full comfort care No further meds as discussed with family  - comfort care as discussed above No further meds as discussed with family

## 2019-11-20 NOTE — CONSULT NOTE ADULT - SUBJECTIVE AND OBJECTIVE BOX
TRAUMA SERVICE (Acute Care Surgery / [facility] - #9050) - CONSULT NOTE  --------------------------------------------------------------------------------------------    TRAUMA ACTIVATION LEVEL:     MECHANISM OF INJURY:      [x] Blunt  	[] MVC	[x] Fall	[] Pedestrian Struck	[] Motorcycle accident      [] Penetrating  	[] Gun Shot Wound 		[] Stab Wound    GCS: 	E: 1	V: 1	M: 2    HPI: 86 yo man with CAD s/p stent x 3, AAA, recent diagnosis of lung ca with mets to liver, chronic myelomonocytic leukemia with thrombocytopenia, on chemotherapy (Keytruda) presenting s/p fall at home. Per wife, patient felt weak upon standing and called out to his wife as he felt himself falling. Upon presentation to ED, patient AOx3 but developed worsening headache and vomited. CT head indicated large SDH with midline shift and level 1 trauma activated. Patient's mental status deteriorated and GCS 4 upon trauma team arrival.     Primary Survey:    A - airway intact  B - bilateral breath sounds and good chest rise  C - initial BP  BP: 125/67 (11-20-19 @ 09:35) *** , HR HR: 84 (11-20-19 @ 09:35) *** , palpable pulses in all extremities  D - GCS 4 on trauma arrival  Exposure obtained      Secondary Survey:  General: NAD  HEENT: Normocephalic, atraumatic, EOMI, PEERLA.  Neck: Soft, midline trachea.  Chest: No chest wall tenderness.   Cardiac: S1, S2, RRR  Respiratory: Bilateral breath sounds, clear and equal bilaterally  Abdomen: Soft, non-distended, non-tender, no rebound, no guarding, no masses palpated  Groin: Normal appearing  Ext: palp radial b/l UE, b/l DP palp in Lower Extrem  Back: no TTP, no palpable runoff/stepoff/deformity        ROS: Unable to obtain secondary to patient condition     PAST MEDICAL & SURGICAL HISTORY:  Chronic myelomonocytic leukemia not having achieved remission  Lung cancer  High cholesterol  HTN (hypertension)  Diabetes mellitus  No significant past surgical history    FAMILY HISTORY:  No pertinent family history in first degree relatives    [] Family history not pertinent as reviewed with the patient and family    SOCIAL HISTORY: Current smoker     ALLERGIES: No Known Allergies      HOME MEDICATIONS:   Home Medications:  amLODIPine 5 mg oral tablet: 1 tab(s) orally once a day (18 Nov 2019 17:59)  bisoprolol 5 mg oral tablet: 1 tab(s) orally once a day (18 Nov 2019 17:59)  Hydrea 500 mg oral capsule: orally 3 times a day (18 Nov 2019 17:59)  Januvia 100 mg oral tablet: 1 tab(s) orally once a day (18 Nov 2019 17:59)  metFORMIN 1000 mg oral tablet: 1 tab(s) orally 2 times a day (18 Nov 2019 17:59)  petrolatum topical ointment: 1 application topically 2 times a day (18 Nov 2019 17:59)  Proscar 5 mg oral tablet: 1 tab(s) orally once a day (18 Nov 2019 17:59)  simvastatin 10 mg oral tablet: 1 tab(s) orally once a day (at bedtime) (18 Nov 2019 17:59)  tamsulosin 0.4 mg oral capsule: 1 cap(s) orally once a day (18 Nov 2019 17:59)  zolpidem 10 mg oral tablet: 1 tab(s) orally once a day (at bedtime) (18 Nov 2019 17:59)      CURRENT MEDICATIONS  MEDICATIONS (STANDING):   MEDICATIONS (PRN):  --------------------------------------------------------------------------------------------    Vitals:   T(C): 36.6 (11-20-19 @ 09:35), Max: 36.6 (11-20-19 @ 09:35)  HR: 84 (11-20-19 @ 09:35) (82 - 84)  BP: 125/67 (11-20-19 @ 09:35) (125/67 - 139/82)  RR: 16 (11-20-19 @ 09:35) (16 - 16)  SpO2: 99% (11-20-19 @ 09:35) (99% - 100%)  CAPILLARY BLOOD GLUCOSE        CAPILLARY BLOOD GLUCOSE    Height (cm): 170.18 (11-20 @ 09:17)  Weight (kg): 57.6 (11-20 @ 09:17)  BMI (kg/m2): 19.9 (11-20 @ 09:17)  BSA (m2): 1.67 (11-20 @ 09:17)    --------------------------------------------------------------------------------------------    LABS  CBC (11-20 @ 11:42)                              7.6<L>                         1.99<L>  )----------------(  --         --    % Neutrophils, --    % Lymphocytes, ANC: --                                  22.5<L>    BMP (11-20 @ 11:42)             134<L>  |  96      |  34<H> 		Ca++ --      Ca 9.4                ---------------------------------( 167<H>		Mg --                 4.2     |  22      |  1.67<H>			Ph --        LFTs (11-20 @ 11:42)      TPro 5.9<L> / Alb 3.0<L> / TBili 1.1 / DBili -- / AST 22 / ALT 22 / AlkPhos 132<H>    Coags (11-20 @ 11:42)  aPTT 34.6 / INR 1.54<H> / PT 18.0<H>          --------------------------------------------------------------------------------------------    MICROBIOLOGY      --------------------------------------------------------------------------------------------    IMAGING    < from: CT Head No Cont (11.20.19 @ 12:02) >    EXAM:  CT BRAIN                            PROCEDURE DATE:  11/20/2019            INTERPRETATION:    CLINICAL INDICATION: Fall, trauma    5mm axial sections of the brain were obtained from base to vertex,   without the intravenous administration ofcontrast material. Coronal and   sagittal computer generated reconstructed views are available.    Comparison is made with the prior MRI of 8/29/2019.    A large acute left frontal parietal holohemispheric subdural hematoma is   identified with its greatest depth measuring 2 cm. There is   interhemispheric extension. There is mass effect on the left lateral   ventricle and significant midline shift to the right of 1.7 cm. The right   lateral ventricle is dilated. The left lateral ventricle is compressed.   There is mild effacement of the left suprasellar cistern and left   perimesencephalic cistern.    There has been previous bilateral lens replacement surgery.        IMPRESSION: Large acute left subdural hematoma with mass effect and   significant midline shift to the right. Signs of early herniation with   suprasellar cistern effacement and effacement of the left   perimesencephalic cistern.    Dr. Guaman discussed these findings with Dr. Suggs on 11/20/2019 12:02   PM with read back.     < end of copied text >    --------------------------------------------------------------------------------------------

## 2019-11-21 VITALS — OXYGEN SATURATION: 87 %

## 2019-11-21 PROCEDURE — 72125 CT NECK SPINE W/O DYE: CPT

## 2019-11-21 PROCEDURE — 86901 BLOOD TYPING SEROLOGIC RH(D): CPT

## 2019-11-21 PROCEDURE — 85027 COMPLETE CBC AUTOMATED: CPT

## 2019-11-21 PROCEDURE — 93005 ELECTROCARDIOGRAM TRACING: CPT

## 2019-11-21 PROCEDURE — 86850 RBC ANTIBODY SCREEN: CPT

## 2019-11-21 PROCEDURE — 72170 X-RAY EXAM OF PELVIS: CPT

## 2019-11-21 PROCEDURE — 85730 THROMBOPLASTIN TIME PARTIAL: CPT

## 2019-11-21 PROCEDURE — 96374 THER/PROPH/DIAG INJ IV PUSH: CPT

## 2019-11-21 PROCEDURE — 71045 X-RAY EXAM CHEST 1 VIEW: CPT

## 2019-11-21 PROCEDURE — 70450 CT HEAD/BRAIN W/O DYE: CPT

## 2019-11-21 PROCEDURE — 99291 CRITICAL CARE FIRST HOUR: CPT | Mod: 25

## 2019-11-21 PROCEDURE — 99232 SBSQ HOSP IP/OBS MODERATE 35: CPT

## 2019-11-21 PROCEDURE — 85610 PROTHROMBIN TIME: CPT

## 2019-11-21 PROCEDURE — 86900 BLOOD TYPING SEROLOGIC ABO: CPT

## 2019-11-21 PROCEDURE — 80053 COMPREHEN METABOLIC PANEL: CPT

## 2019-11-21 RX ORDER — METFORMIN HYDROCHLORIDE 850 MG/1
1 TABLET ORAL
Qty: 0 | Refills: 0 | DISCHARGE

## 2019-11-21 RX ORDER — AMLODIPINE BESYLATE 2.5 MG/1
1 TABLET ORAL
Qty: 0 | Refills: 0 | DISCHARGE

## 2019-11-21 RX ORDER — TENOFOVIR DISOPROXIL FUMARATE 300 MG/1
1 TABLET, FILM COATED ORAL
Qty: 0 | Refills: 0 | DISCHARGE

## 2019-11-21 RX ORDER — ZOLPIDEM TARTRATE 10 MG/1
1 TABLET ORAL
Qty: 0 | Refills: 0 | DISCHARGE

## 2019-11-21 RX ORDER — TAMSULOSIN HYDROCHLORIDE 0.4 MG/1
1 CAPSULE ORAL
Qty: 0 | Refills: 0 | DISCHARGE

## 2019-11-21 RX ORDER — FINASTERIDE 5 MG/1
1 TABLET, FILM COATED ORAL
Qty: 0 | Refills: 0 | DISCHARGE

## 2019-11-21 RX ORDER — SIMVASTATIN 20 MG/1
1 TABLET, FILM COATED ORAL
Qty: 0 | Refills: 0 | DISCHARGE

## 2019-11-21 RX ORDER — SITAGLIPTIN 50 MG/1
1 TABLET, FILM COATED ORAL
Qty: 0 | Refills: 0 | DISCHARGE

## 2019-11-21 RX ORDER — BISOPROLOL FUMARATE 10 MG/1
1 TABLET, FILM COATED ORAL
Qty: 0 | Refills: 0 | DISCHARGE

## 2019-11-21 RX ADMIN — Medication 1000 MILLIGRAM(S): at 00:02

## 2019-11-21 NOTE — PROGRESS NOTE ADULT - SUBJECTIVE AND OBJECTIVE BOX
Sy King MD  Pager [phone]  [facility] [id number]  Cell Phone [phone]    Patient is a 85y old  Male who presents with a chief complaint of fall, subdural hematoma (20 Nov 2019 16:57)        SUBJECTIVE / OVERNIGHT EVENTS: Patient appears comfortable      MEDICATIONS  (STANDING):  influenza   Vaccine 0.5 milliLiter(s) IntraMuscular once    MEDICATIONS  (PRN):  glycopyrrolate Injectable 0.4 milliGRAM(s) IV Push every 4 hours PRN Secretions  HYDROmorphone  Injectable 0.2 milliGRAM(s) IV Push every 1 hour PRN moderate to severe pain  HYDROmorphone  Injectable 0.2 milliGRAM(s) IV Push every 1 hour PRN labored breathing or RR >28  LORazepam   Injectable 0.5 milliGRAM(s) IV Push every 1 hour PRN Agitation  LORazepam   Injectable 2 milliGRAM(s) IV Push every 1 hour PRN Seizure      Vital Signs Last 24 Hrs  T(C): 36.5 (21 Nov 2019 08:43), Max: 38.9 (21 Nov 2019 00:02)  T(F): 97.7 (21 Nov 2019 08:43), Max: 102 (21 Nov 2019 00:02)  HR: 103 (21 Nov 2019 05:25) (102 - 140)  BP: 121/45 (21 Nov 2019 05:25) (109/45 - 153/59)  BP(mean): --  RR: 28 (21 Nov 2019 05:25) (16 - 35)  SpO2: 100% (21 Nov 2019 05:25) (100% - 100%)  CAPILLARY BLOOD GLUCOSE        I&O's Summary        PHYSICAL EXAM  GENERAL: NAD but slightly tachypneic, cachetic   HEAD:  Atraumatic, Normocephalic  CHEST/LUNG: Clear to auscultation anteriorly; No wheezes  HEART: Tachy, +S1+S2  ABDOMEN: Distended bladder palpated; Bowel sounds present  EXTREMITIES:  2+ Peripheral Pulses, No clubbing, cyanosis, or edema  PSYCH: AAOx0      LABS:                        7.6    1.99  )-----------( 9        ( 20 Nov 2019 11:42 )             22.5     11-20    134<L>  |  96  |  34<H>  ----------------------------<  167<H>  4.2   |  22  |  1.67<H>    Ca    9.4      20 Nov 2019 11:42    TPro  5.9<L>  /  Alb  3.0<L>  /  TBili  1.1  /  DBili  x   /  AST  22  /  ALT  22  /  AlkPhos  132<H>  11-20    PT/INR - ( 20 Nov 2019 11:42 )   PT: 18.0 sec;   INR: 1.54 ratio         PTT - ( 20 Nov 2019 11:42 )  PTT:34.6 sec            RADIOLOGY & ADDITIONAL TESTS:    Imaging Personally Reviewed:  Consultant(s) Notes Reviewed:    Care Discussed with Consultants/Other Providers:

## 2019-11-21 NOTE — DISCHARGE NOTE FOR THE EXPIRED PATIENT - HOSPITAL COURSE
85 year old male with PMH of HTN, DM, metastatic lung ca, CML currently on chemotherapy with last cycle 2019 who presents to the ED after a fall in his home found to have a large L subdural hematoma with mass effect and midline, Admitted to medicine transitioned to full comfort care.  with family at bedside 85 year old male with PMH of HTN, DM, metastatic lung ca, CML currently on chemotherapy with last cycle 2019 who presents to the ED after a fall in his home found to have a large L subdural hematoma with mass effect and midline, Admitted to medicine transitioned to full comfort care.  with family at bedside, D/W Dr King

## 2019-11-21 NOTE — PROGRESS NOTE ADULT - ASSESSMENT
85 year old male with PMH of HTN, DM, metastatic lung ca, CML currently on chemotherapy with last cycle 11/2019 who presents to the ED after a fall in his home found to have a large L subdural hematoma with mass effect and midline shift now full comfort care. Continue prn medications. Awaiting PCU bed.

## 2019-11-22 ENCOUNTER — APPOINTMENT (OUTPATIENT)
Dept: INFUSION THERAPY | Facility: HOSPITAL | Age: 84
End: 2019-11-22

## 2019-11-25 ENCOUNTER — APPOINTMENT (OUTPATIENT)
Dept: INFUSION THERAPY | Facility: HOSPITAL | Age: 84
End: 2019-11-25

## 2019-11-26 ENCOUNTER — APPOINTMENT (OUTPATIENT)
Dept: INFUSION THERAPY | Facility: HOSPITAL | Age: 84
End: 2019-11-26

## 2019-11-27 ENCOUNTER — APPOINTMENT (OUTPATIENT)
Dept: INFUSION THERAPY | Facility: HOSPITAL | Age: 84
End: 2019-11-27

## 2019-11-29 ENCOUNTER — APPOINTMENT (OUTPATIENT)
Dept: INFUSION THERAPY | Facility: HOSPITAL | Age: 84
End: 2019-11-29

## 2019-11-29 ENCOUNTER — APPOINTMENT (OUTPATIENT)
Dept: HEMATOLOGY ONCOLOGY | Facility: CLINIC | Age: 84
End: 2019-11-29

## 2019-11-30 ENCOUNTER — APPOINTMENT (OUTPATIENT)
Dept: INFUSION THERAPY | Facility: HOSPITAL | Age: 84
End: 2019-11-30

## 2019-12-02 ENCOUNTER — APPOINTMENT (OUTPATIENT)
Dept: HEMATOLOGY ONCOLOGY | Facility: CLINIC | Age: 84
End: 2019-12-02

## 2019-12-02 ENCOUNTER — APPOINTMENT (OUTPATIENT)
Dept: INFUSION THERAPY | Facility: HOSPITAL | Age: 84
End: 2019-12-02

## 2019-12-03 ENCOUNTER — APPOINTMENT (OUTPATIENT)
Dept: INFUSION THERAPY | Facility: HOSPITAL | Age: 84
End: 2019-12-03

## 2019-12-04 ENCOUNTER — APPOINTMENT (OUTPATIENT)
Dept: INFUSION THERAPY | Facility: HOSPITAL | Age: 84
End: 2019-12-04

## 2019-12-05 ENCOUNTER — APPOINTMENT (OUTPATIENT)
Dept: INFUSION THERAPY | Facility: HOSPITAL | Age: 84
End: 2019-12-05

## 2019-12-06 ENCOUNTER — APPOINTMENT (OUTPATIENT)
Dept: INFUSION THERAPY | Facility: HOSPITAL | Age: 84
End: 2019-12-06

## 2019-12-20 ENCOUNTER — APPOINTMENT (OUTPATIENT)
Dept: HEMATOLOGY ONCOLOGY | Facility: CLINIC | Age: 84
End: 2019-12-20

## 2019-12-20 ENCOUNTER — APPOINTMENT (OUTPATIENT)
Dept: INFUSION THERAPY | Facility: HOSPITAL | Age: 84
End: 2019-12-20

## 2020-01-08 ENCOUNTER — APPOINTMENT (OUTPATIENT)
Dept: HEMATOLOGY ONCOLOGY | Facility: CLINIC | Age: 85
End: 2020-01-08

## 2020-02-03 ENCOUNTER — APPOINTMENT (OUTPATIENT)
Dept: HEMATOLOGY ONCOLOGY | Facility: CLINIC | Age: 85
End: 2020-02-03

## 2020-03-03 ENCOUNTER — APPOINTMENT (OUTPATIENT)
Dept: HEMATOLOGY ONCOLOGY | Facility: CLINIC | Age: 85
End: 2020-03-03

## 2020-03-10 NOTE — PROVIDER CONTACT NOTE (OTHER) - SITUATION
OPERATIVE NOTE  Moy Feng Andrews  1978  3/10/2020    PREOP DIAGNOSES:  Adenomyosis [N80.9] pelvic pain    POSTOP DIAGNOSES:  Post-Op Diagnosis Codes:     * Adenomyosis [N80.9]pelvic pain       Procedure(s):  Total ABDOMIAL HYSTERECTOMY BILATERAL SALPINGO-OOPHERECTOMY converted fron LAVH CYSTOSCOPY    SURGEON: Evans Syed MD, FACOG              STAFF:   Circulator: Vilma Lee RN  Scrub Person: Jannet Loving  Assistant: Shazia Luong CSA    ANESTHESIA: General    ANESTHESIA STAFF:  Anesthesiologist: Sorin Duckworth MD  CRNA: Felice Millard CRNA  Student Nurse Anesthetist: Sandra Kendrick SRNA    ESTIMATED BLOOD LOSS: 350 ml     SPECIMEN:   ID Type Source Tests Collected by Time   A (Not marked as sent) : tagged left Tissue Uterus with Cervix, Bilateral Tubes and Ovaries TISSUE PATHOLOGY EXAM Evans Syed MD 3/10/2020 0905       FINDINGSFindings Marked genitourinary cancer cavity cervix was extremely stenotic unable to dilate past 5 and even with this 1 is unable to adequately place V care device.  Because of this converted to open procedure.  There were significant adhesions between bladder and uterus from prior  section and significant pelvic adhesions on the left obscuring anatomy:     COMPLICATIONS: None     :   After satisfactory general anesthesia been obtained patient was prepped and draped including a vaginal prep and placement of three-way catheter and placed in dorsal lithotomy position.  The cervix was attempted to be dilated to place a Tea manipulator but there was marked genitourinary atrophy.  I could only dilate up to 5 and it was very to height 5.  I placed a V care as best possible.  I went above and placed a varies needle through the various layers of the abdominal cavity pneumoperitoneum created with 3 L of carbon dioxide with good rearing throughout after positive hanging drop test.  A 5 trocar was placed at the umbilicus using the Visiport technique.  A 5 was 
placed laterally and inferiorly.  It became clear that the V care device was not adequately outlining the cervix and there were substantial adhesions between the bladder and the uterus.  Because of this it was felt best to proceed with an open procedure.  A Pfannenstiel incision was made carried down to the fascia.  Pfannenstiel incision developed by dissecting the fascia of the linea alba for cephalad and then caudally.  Rectus muscle was divided with blunt dissection.  Peritoneum entered high.  Incision carried further cephalad and then caudally.  A Vin retractor was placed.  The uterus brought into the incision.  The round ligament was ligated and incised on the left ureter identified out of field.  A defect made in the broad ligament and using the endo-seal the left infundibulopelvic ligament was sealed and transected.  This was repeated on the opposite side.  The broad ligament was taken down on both sides.  The uterine artery was sealed and transected on both sides the cardinal and uterosacral ligament was clamped and incised on the left and Barrington stitch this was repeated on the opposite side and then again on the left and then on the right.  A Z-Clamp was placed on the left and right mettimg of the vaginal cuff and the specimen excised.  Specimen reviewed the entire cervix had been excised.  The Z-Clamp Barrington stitched.  The middle of the vaginal cuff closed with figure-of-eight the pelvis irrigated bleeding points made hemostatic with 2-0 chromic stitch.  Pelvis irrigated with sterile water good hemostasis noted.  Bladder evaluated by filling with 200 cc of methylene blue. The parietal peritoneum was closed with 2-0 Vicryl in a running fashion meticulous subfascial hemostasis obtained.  Fascia closed 0 Vicryl running fashion good approximation visually palpation no evidence of leakage scarpus and fatty tissue closed with 2 0 plain in a running fashion skin closed 3-0 the skin was taught even after 
placement of a subcuticular labor and because of this it was closed with widely spaced staples        This document has been electronically signed by Evans Syed MD on March 10, 2020 15:53        This document has been electronically signed by Evans Syed MD on March 10, 2020 15:48      Please note that portions of this note were completed with a voice recognition program.            
utilized  phone to obtain pt's obtain pt's preference in melatonin or benadryl.
pt requesting medication to help sleep
pt requesting sleeping medication tonight.

## 2020-05-21 ENCOUNTER — APPOINTMENT (OUTPATIENT)
Dept: VASCULAR SURGERY | Facility: CLINIC | Age: 85
End: 2020-05-21

## 2020-08-12 NOTE — ED ADULT TRIAGE NOTE - NS ED NURSE DIRECT TO ROOM YN
pupils fixed , dilated, non responsive,no corneals/gag
pupils fixed, dilated, non responsive.
pupils fixed, dilated, non responsive. No GAG
pupils fixed, dilated, no corneal/gag
No

## 2020-09-01 NOTE — H&P ADULT. - PROBLEM/PLAN-3
4 23 18 - NO SIG IMPROVEMENT IN [facility] WITH L TX AND 5 WKS F/U ON 10 30 17- REVIEWED OPTIONS WITH PATIENT OF CONTINUING TX VS FOLLOWING - AS NO SIG IMPROVMENT PT HAS ELECTED TO HOLD ON FURTHER TREATMENT. DISPLAY PLAN FREE TEXT

## 2020-11-09 NOTE — ED ADULT NURSE NOTE - BED ASSIGNMENT RECEIVED
Prior Authorization Retail Medication Request    Medication/Dose: Saxenda  ICD code (if different than what is on RX):    Class 3 severe obesity due to excess calories with serious comorbidity and body mass index (BMI) of 60.0 to 69.9 in adult (H) [E66.01, Z68.44]  - Primary       Pre-diabetes [R73.03]       Metabolic syndrome [E88.81]           Previously Tried and Failed:  Topiramate, history of diet and exercise  Rationale:  she is morbidly obese and considering weight loss surgery to treat obesity in association with her medical conditions of obesity.  Her consult weight was 410.  She has lost 0 pounds since her consult weight. She has not met her required pre-surgery weight (369). Topiramate- taper to 75mg at bedtime. Took for the first week at 25mg. Was noticing some uncomfortable side effects- felt almost like panic attacks (tingling in extremities, headaches, dizziness). Has stopped the medication. Ozempic was not covered by insurance.     Insurance Name:    Insurance ID:        Pharmacy Information (if different than what is on RX)  Name:  [id number] - [place], [place] - [address]  Phone:  [phone]   07:29

## 2021-01-18 NOTE — PATIENT PROFILE ADULT - NSPROGENSOURCEINFO_GEN_A_NUR
(0) lying quietly, normal position, moves easily/(0) content, relaxed/(0) no cry (awake or asleep)/(0) no particular expression or smile/(0) normal position or relaxed family

## 2021-01-22 NOTE — PROGRESS NOTE ADULT - ATTENDING COMMENTS
37.1 Refilled per medication protocol.    Seen, examined the patient with house staff,   - seen by Heme/Onc and was transferred to Oncology floor for further care under Oncology service     at this point

## 2021-01-29 NOTE — PROVIDER CONTACT NOTE (CRITICAL VALUE NOTIFICATION) - ACTION/TREATMENT ORDERED:
1 unit PRBCs
awaiting call back from team 2
No intervention at this time. Continue to monitor.
1 unit of PRBCs
NP will continue to monitor, possible transfuse
None at this time. Will continue to monitor.
PRBC transfusion ordered
no treatment at this time continue current treatment
to give Tylenol, wait for blood cults to come back.
MD Guillen aware at this time. Will follow orders. Safety maintained. Will continue to monitor patient.
3

## 2021-05-03 NOTE — ED PROVIDER NOTE - NS CPE EDP [facility]C LUMBAR LOC
From: J Carlos Person  To: Aicha Zafar PA-C  Sent: 5/3/2021 4:19 PM CDT  Subject: Non-Urgent Medical Question    Hi. Can you please resubmit the cleanser for me to [facility]? I went out of town and forgot to pick it up before it .  Sorry about th
No stepoffs or deformities. No midline vertebral or paraspinal ttp.

## 2021-05-27 NOTE — ED PROVIDER NOTE - OBJECTIVE STATEMENT
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 Wife translates at bedside per pt request, she reports pt got out of bed this morning and felt weak upon standing, called out to wife in next room because felt self falling. Wife came in immediately after hearing him call out, saw pt on floor on his R side, awake and alert. 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 Wife translates at bedside per pt request, she reports pt got out of bed this morning and felt weak upon standing, called out to wife in next room because felt self falling. Wife came in immediately after hearing him call out, saw pt on floor on his R side, awake and alert. Pt helped up by spouse and was ambulatory after. Since then has been c/o progressively worse R sided headache. Wife does report pt took sleeping pill last night and seemed more tired than usual this morning even before the fall, no change after. Pt seems at baseline mental status per wife at this time, just slightly tired. Pt was supposed to go to [facility] today for platelet check but after fall wife took pts temp this morning and noted it was 99.8 orally and brought pt to ED after worsening headache. Denies LOC, seizure, anticoagulant usage, n/v, speech/visual changes, new weakness, bowel/bladder incontinence, cp, sob, numbness/tingling, chest pain, shortness of breath, abdominal pain, back/hip pain. 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 Wife translates at bedside per pt request, she reports pt got out of bed this morning and felt weak upon standing, called out to wife in next room because felt self falling. Wife came in immediately after hearing him call out, saw pt on floor on his R side, awake and alert. Pt helped up by spouse and was ambulatory after. Since then has been c/o progressively worse R sided headache. Wife does report pt took sleeping pill last night and seemed more tired than usual this morning even before the fall, no change after. Pt seems at baseline mental status per wife at this time, just slightly tired. Pt was supposed to go to [facility] today for platelet check but after fall wife took pts temp this morning and noted it was 99.8 orally and brought pt to ED after worsening headache. Denies LOC, seizure, anticoagulant usage, n/v, speech/visual changes, new weakness, bowel/bladder incontinence, cp, sob, numbness/tingling, chest pain, shortness of breath, abdominal pain, back/hip pain. Mirvaso Pregnancy And Lactation Text: This medication has not been assigned a Pregnancy Risk Category. It is unknown if the medication is excreted in breast milk.

## 2021-08-31 NOTE — DISCHARGE NOTE PROVIDER - HOSPITAL COURSE
MDPP/DPP Enrollment Follow Up.    PT is looking into insurance coverage. She is considering self-pay option if she doesn't have coverage.    Notes.   - She said her insurance is through [employer] / [employer], specially for government employees: [employer] ( health association.   - She connected with [facility] billing (rep Pili), too.Provided pt the name associated with the billing codes and a few billing code s - though my thought is that she needs to find out if her insurance offers DPP coverage.    - Reschedule enrollment process today to 9/8 to provide pt more time to obtain info.     - Pt requested a few DPP links emailed to learn more ~ the program. No phi in email.   -Provided pt scheduling & my direct office line.     -GN   84 Mandarin speaking M with hx of DM2, HTN, PVD, CAD s/p 3 stents Sept 2019, AAA/4 stents, current smoker,  newly dx lung cancer (s/p needle bx in [place], not on tx ), with liver mets p/w SOB, weakness found to have leukocytosis with wbc 129 and 20% blasts. Pt has long standing history of abnormal CBC (since 2012),      bx 2 yrs w/o malignancy per family now presented with blast crisis. Smear s/o CML with blast phase but flow is pending to confirm. He underwent a BMBX on 9/23. He also noted to have Large EMILE lung mass, smaller Right lung nodule and findings concerning for liver mets. Pt has leukocytosis, anemia and thrombocytopenia due to malignancy 84 Mandarin speaking M with hx of DM2, HTN, PVD, CAD s/p 3 stents Sept 2019, AAA/4 stents, current smoker,  newly dx lung cancer (s/p needle bx in [place], not on tx ), with liver mets p/w SOB, weakness found to have leukocytosis with wbc 129 and 20% blasts. Pt has long standing history of abnormal CBC (since 2012),      bx 2 yrs w/o malignancy per family now presented with blast crisis. Smear s/o CML with blast phase but flow is pending to confirm. He underwent a BMBX on 9/23. He also noted to have Large EMILE lung mass, smaller Right lung nodule and findings concerning for liver mets. Pt has leukocytosis, anemia and thrombocytopenia due to malignancy         Pt's hospital work up including the following:    CXR on  9/22 revealing pulmonary edema.New large left upper lobe nodular opacity. A CT scan of the chest is recommended for further evaluation.     CTA chest performed on 9/23 1.  No central pulmonary embolus. The subsegmental branches are not evaluated secondary to respiratory motion.    2.  4.3 x 3.4 cm mass posterior left upper lobe. 1.3 cm anterior right upper lobe nodule. These are likely neoplastic. 3.  Multiple hypodense lesions within the liver likely metastatic. 4.  Splenomegaly. Peripheral hypodensities within the spleen may represent infarct or metastatic lesions or combination of both.    Echocardiogram perform on 9/23 EF 65% Normal left ventricular systolic function. No segmental wall motion abnormalities. Mild pulmonary hypertension.     An IR consult for mediport was requested on 9/24___________________        Dermatology consulted  on 9/24 for pruritic rash which was multifactorial but no leukemic lesion was noted on examination. 84 Mandarin speaking M with hx of DM2, HTN, PVD, CAD s/p 3 stents Sept 2019, AAA/4 stents, current smoker,  newly dx lung cancer (s/p needle bx in [place], not on tx ), with liver mets p/w SOB, weakness found to have leukocytosis with wbc 129 and 20% blasts. Pt has long standing history of abnormal CBC (since 2012),      bx 2 yrs w/o malignancy per family now presented with blast crisis. Smear s/o CML with blast phase but flow is pending to confirm. He underwent a BMBX on 9/23. He also noted to have Large EMILE lung mass, smaller Right lung nodule and findings concerning for liver mets. Pt has leukocytosis, anemia and thrombocytopenia due to malignancy         Pt's hospital work up including the following:    CXR on  9/22 revealing pulmonary edema.New large left upper lobe nodular opacity. A CT scan of the chest is recommended for further evaluation.     CTA chest performed on 9/23 1.  No central pulmonary embolus. The subsegmental branches are not evaluated secondary to respiratory motion.    2.  4.3 x 3.4 cm mass posterior left upper lobe. 1.3 cm anterior right upper lobe nodule. These are likely neoplastic. 3.  Multiple hypodense lesions within the liver likely metastatic. 4.  Splenomegaly. Peripheral hypodensities within the spleen may represent infarct or metastatic lesions or combination of both.    Echocardiogram perform on 9/23 EF 65% Normal left ventricular systolic function. No segmental wall motion abnormalities. Mild pulmonary hypertension.    Dermatology consulted  on 9/24 for pruritic rash which was multifactorial but no leukemic lesion was noted on examination.                 Hepatitis B Core AB was positive and PCR was sent and showed ________. ID was consulted for Mediport placement with recent fevers and recomendded Quantiferon gold to be tested and showed ______.  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 Mediport and Liver bx was completed on 9/27 (results pending at raffaele of discharge) and the patient was discharged to home with home PT and continuation of Hydrea. Follow up outpatient was arranged as well. 84 Mandarin speaking M with hx of DM2, HTN, PVD, CAD s/p 3 stents Sept 2019, AAA/4 stents, current smoker,  newly dx lung cancer (s/p needle bx in [place], not on tx ), with liver mets p/w SOB, weakness found to have leukocytosis with wbc 129 and 20% blasts. Pt has long standing history of abnormal CBC (since 2012),      bx 2 yrs w/o malignancy per family now presented with blast crisis. Smear s/o CML with blast phase but flow is pending to confirm. He underwent a BMBX on 9/23. He also noted to have Large EMILE lung mass, smaller Right lung nodule and findings concerning for liver mets. Pt has leukocytosis, anemia and thrombocytopenia due to malignancy         Pt's hospital work up including the following:    CXR on  9/22 revealing pulmonary edema.New large left upper lobe nodular opacity. A CT scan of the chest is recommended for further evaluation.     CTA chest performed on 9/23 1.  No central pulmonary embolus. The subsegmental branches are not evaluated secondary to respiratory motion.    2.  4.3 x 3.4 cm mass posterior left upper lobe. 1.3 cm anterior right upper lobe nodule. These are likely neoplastic. 3.  Multiple hypodense lesions within the liver likely metastatic. 4.  Splenomegaly. Peripheral hypodensities within the spleen may represent infarct or metastatic lesions or combination of both.    Echocardiogram perform on 9/23 EF 65% Normal left ventricular systolic function. No segmental wall motion abnormalities. Mild pulmonary hypertension.    Dermatology consulted  on 9/24 for pruritic rash which was multifactorial but no leukemic lesion was noted on examination.                 Hepatitis B Core AB was positive and Tenofovir was started and PCR was sent. Hep B PCR was negative and Tenofovir was continued for previous exposure.  ID was consulted for Mediport placement with recent fevers and recomendded Quantiferon gold to be tested and showed ______.  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 Mediport and Liver bx was completed on 9/27 (results pending at raffaele of discharge) and the patient was discharged to home with home PT and continuation of Hydrea. Follow up outpatient was arranged as well. 84 Mandarin speaking M with hx of DM2, HTN, PVD, CAD s/p 3 stents Sept 2019, AAA/4 stents, current smoker,  newly dx lung cancer (s/p needle bx in [place], not on tx ), with liver mets p/w SOB, weakness found to have leukocytosis with wbc 129 and 20% blasts. Pt has long standing history of abnormal CBC (since 2012),      bx 2 yrs w/o malignancy per family now presented with blast crisis. Smear s/o CML with blast phase but flow is pending to confirm. He underwent a BMBX on 9/23. He also noted to have Large EMILE lung mass, smaller Right lung nodule and findings concerning for liver mets. Pt has leukocytosis, anemia and thrombocytopenia due to malignancy         Pt's hospital work up including the following:    CXR on  9/22 revealing pulmonary edema.New large left upper lobe nodular opacity. A CT scan of the chest is recommended for further evaluation.     CTA chest performed on 9/23 1.  No central pulmonary embolus. The subsegmental branches are not evaluated secondary to respiratory motion.    2.  4.3 x 3.4 cm mass posterior left upper lobe. 1.3 cm anterior right upper lobe nodule. These are likely neoplastic. 3.  Multiple hypodense lesions within the liver likely metastatic. 4.  Splenomegaly. Peripheral hypodensities within the spleen may represent infarct or metastatic lesions or combination of both.    Echocardiogram perform on 9/23 EF 65% Normal left ventricular systolic function. No segmental wall motion abnormalities. Mild pulmonary hypertension.    Dermatology consulted  on 9/24 for pruritic rash which was multifactorial but no leukemic lesion was noted on examination.                 Hepatitis B Core AB was positive and Tenofovir was started. PCR was sent and found to be negative and Tenofovir was continued for previous exposure.  ID was consulted for Mediport placement with recent fevers and recomendded Quantiferon gold to be tested and showed ______.  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 Mediport and Liver bx was completed on 9/27 (results pending at raffaele of discharge) and the patient was discharged to home with home PT and continuation of Hydrea. Follow up outpatient was arranged as well. 84 Mandarin speaking M with hx of DM2, HTN, PVD, CAD s/p 3 stents Sept 2019, AAA/4 stents, current smoker,  newly dx lung cancer (s/p needle bx in [place], not on tx ), with liver mets p/w SOB, weakness found to have leukocytosis with wbc 129 and 20% blasts. Pt has long standing history of abnormal CBC (since 2012),      bx 2 yrs w/o malignancy per family now presented with blast crisis. Smear s/o CML with blast phase but flow is pending to confirm. He underwent a BMBX on 9/23. He also noted to have Large EMILE lung mass, smaller Right lung nodule and findings concerning for liver mets. Pt has leukocytosis, anemia and thrombocytopenia due to malignancy         Pt's hospital work up including the following:    CXR on  9/22 revealing pulmonary edema.New large left upper lobe nodular opacity. A CT scan of the chest is recommended for further evaluation.     CTA chest performed on 9/23 1.  No central pulmonary embolus. The subsegmental branches are not evaluated secondary to respiratory motion.    2.  4.3 x 3.4 cm mass posterior left upper lobe. 1.3 cm anterior right upper lobe nodule. These are likely neoplastic. 3.  Multiple hypodense lesions within the liver likely metastatic. 4.  Splenomegaly. Peripheral hypodensities within the spleen may represent infarct or metastatic lesions or combination of both.    Echocardiogram perform on 9/23 EF 65% Normal left ventricular systolic function. No segmental wall motion abnormalities. Mild pulmonary hypertension.    Dermatology consulted  on 9/24 for pruritic rash which was multifactorial but no leukemic lesion was noted on examination.     Hepatitis B Core AB was positive and Tenofovir was started. PCR was sent and found to be negative and Tenofovir was continued for previous exposure.  ID was consulted for Mediport placement with recent fevers and recomendded Quantiferon gold to be tested and showed ______.  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 Patient required PRBC transfusion consecutively fro 3 days after his liver biopsy, CT of A/P was done which revealed a small retroperitoneal collection of blood, moniotred CBC Q12hrs, CT of A/P was repeated on 10/1 to reevaluate the retroperitoneal bleed ______________.Mediport and Liver bx was completed on 9/27 (results pending at raffaele of discharge) and the patient was discharged to home with home PT and continuation of Hydrea. Follow up outpatient was arranged as well. 84 Mandarin speaking Male with hx of DM2, HTN, PVD, CAD s/p 3 stents Sept 2019, AAA/4 stents, current smoker,  newly diagnosed Lung cancer with metastasis to liver had a fine needle biopsy which was (+) for malignancy. Patient presented to us with progressive shortness of breath, generalized weakness, fevers and chills. Patient was noted to have hyper leukocytosis with increased blasts. 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         Infectious work up was (-) inclusive of RVP and cultures. (9/23) CTA of the lung was negative for central Pulmonary embolus lung and liver masses were detected consistent with history. 9/23 Echo revealed an EF of 65%. 9/24 Initial CT of the abd/pel was showed infrarenal AAA s/p bifurcated aortic stent graft. Repeat CT of the A/P was done on 9/29 which was (+) for small right hemoperitoneum which was attributed to recent liver biopsy. CBC was closely monitored. Platelets and PRBC infusion was provided as needed. A CT of the A/P was repeated on 10/1 which revealed stable hemoperitoneum. 84 Mandarin speaking Male with hx of DM2, HTN, PVD, CAD s/p 3 stents Sept 2019, AAA/4 stents, current smoker,  newly diagnosed Lung cancer with metastasis to liver presented to us with progressive shortness of breath, generalized weakness, fevers and chills. Patient was noted to have hyper leukocytosis with increased blasts. 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         Infectious work up was (-) inclusive of RVP and cultures. Initial dyspnea prompted a CTA of the lung (9/23) which was negative for central Pulmonary embolus, but lung and liver masses were detected consistent with history. Patient complained of a pruritic rash on upper back and feet. Seen by Dermatology and was said to be 2/2 to underlying malignancy and obstructive hepatopathy. The rash completely resolved with Vaseline and Sarna cream.  9/23 Echo revealed an EF of 65%. 9/24 Initial CT of the abd/pel was showed stable infrarenal AAA s/p bifurcated aortic stent graft ( ~5cm) . Multiple hypodense lesions were seen throughout the liver with the largest measuring 6x5 cm in th left lobe. Patient had a biopsy of the liver lesion on 9/27, after which patient complained of abdominal pain. CT of the A/P was done on 9/29 which was (+) for small right hemoperitoneum which was attributed to recent liver biopsy. CBC was closely monitored. Platelets and PRBC infusions were provided as needed. A CT of the A/P was repeated on 10/1 which revealed stable hemoperitoneum. Decision was made to discharge patient with platelet and MD appointments in place.

## 2021-10-12 NOTE — ED PROVIDER NOTE - CRITICAL CARE PROVIDED
18 conducted a detailed discussion of DNR status/interpretation of diagnostic studies/consultation with other physicians/direct patient care (not related to procedure)/documentation/consult w/ pt's family directly relating to pts condition/telephone consultation with the patient's family

## 2022-01-03 NOTE — ED ADULT NURSE NOTE - CAS TRG GEN SKIN CONDITION
Rhombic Flap Text: The defect edges were debeveled with a #15 scalpel blade.  Given the location of the defect and the proximity to free margins a rhombic flap was deemed most appropriate.  Using a sterile surgical marker, an appropriate rhombic flap was drawn incorporating the defect.    The area thus outlined was incised deep to adipose tissue with a #15 scalpel blade.  The skin margins were undermined to an appropriate distance in all directions utilizing iris scissors. Warm

## 2022-03-30 NOTE — ED PROVIDER NOTE - TRANSFER CONSULTATION #2
Additional Notes: Patient consent was obtained to proceed with the visit and recommended plan of care after discussion of all risks and benefits, including the risks of COVID-19 exposure. Detail Level: Simple Render Risk Assessment In Note?: no will see patient in ED

## 2022-07-14 NOTE — PATIENT PROFILE ADULT. - AS SC BRADEN MOISTURE
(4) rarely moist Bilobed Transposition Flap Text: The defect edges were debeveled with a #15 scalpel blade.  Given the location of the defect and the proximity to free margins a bilobed transposition flap was deemed most appropriate.  Using a sterile surgical marker, an appropriate bilobe flap drawn around the defect.    The area thus outlined was incised deep to adipose tissue with a #15 scalpel blade.  The skin margins were undermined to an appropriate distance in all directions utilizing iris scissors.

## 2023-10-02 NOTE — PROGRESS NOTE ADULT - PROBLEM SELECTOR PROBLEM 5
HTN (hypertension) Qbrexza Pregnancy And Lactation Text: There is no available data on Qbrexza use in pregnant women.  There is no available data on Qbrexza use in lactation.

## 2023-11-15 NOTE — CONSULT NOTE ADULT - ASSESSMENT
ASSESSMENT:    -----------  - RVP negative, UA negative    RECOMMENDATIONS:    JOSE Nuñez, MD  Fellow, Infectious Diseases  Pager: [phone]  After 5pm and on Weekends: Call [phone] ASSESSMENT:    -----------  - RVP negative, UA negative    Past Abx: Vanc 9/22  Zosyn 9/22-9/24  Fluconazole 9/23-  Levofloxacin 9/25-    RECOMMENDATIONS:    JOSE Nuñez, MD  Fellow, Infectious Diseases  Pager: [phone]  After 5pm and on Weekends: Call [phone] ASSESSMENT:  84/M with PMH DM2, HTN, CVA, CAD s/p stent, PVD, BPH, current heavy smoker, newly diagnosed Lung ca w/ liver mets (no treatment received), chronic leukocytosis under evaluation. Recent travel to [facility] 1 week prior to admission. P/w progressively worsening SOB, generalized weakness, fevers, chills, LE edema, nonproductive cough, gradual weight loss over the past year.  - Found to have leukocytosis to 129,000 with 20% blasts, 11% metamyelocytes, elevated LDH, elevated BNP  - Imaging concerning for lung mass, metastases to c/l lung, liver, spleen, intra-abdominal LNs  - ID consulted for fevers  -----------  At this time, there is low suspicion for infectious cause of fevers and leucocytosis. This could be 2/2 underlying malignancies  - RVP negative, UA negative, Urine Legionella negative, HIV negative. Urine Cx negative, Blood Cx negative till date. Imaging does not show any obvious source of infection  - Patient has granulomas in both lung fields. Family reported negative PPD testing in past (during immigration to ).  - HBcAb positive, HBsAb indeterminate: could be old resolved infection.    Past Abx: Vanc 9/22  Zosyn 9/22-9/24  Fluconazole 9/23-  Levofloxacin 9/25-    RECOMMENDATIONS:  1. Fevers, leucocytosis  - continue prophylactic antibiotics per Oncology  - if patient has signs of hemodynamic compromise, would recommend re-ordering blood cultures and starting Cefepime 2g IV q12h  - at this time, there appears a low suspicion for infectious source of fevers and leucocytosis. There is no absolute contra-indication from ID standpoint for chest port placement  - will f/u repeat Cx sent today  --------------  2. Lung Granulomas noted in CT Chest  - Family reported negative PPD testing in past (during immigration to ).  - given possibility of future chemotherapy and immune suppression, would check Quantiferon  -----------  3. Positive Hep B core antibody  - HBcAb positive, HBsAb indeterminate: could be old resolved infection.  - recommend checking Hep B PCR  Recs conveyed to primary team    JOSE Nuñez MD  Fellow, Infectious Diseases  Pager: [phone]  After 5pm and on Weekends: Call [phone] ASSESSMENT:    84/M with PMH DM2, HTN, CVA, CAD s/p stent, PVD, BPH, current heavy smoker, newly diagnosed Lung ca w/ liver mets (no treatment received), chronic leukocytosis under evaluation. Recent travel to [facility] 1 week prior to admission. P/w progressively worsening SOB, generalized weakness, fevers, chills, LE edema, nonproductive cough, gradual weight loss over the past year.  - Found to have leukocytosis to 129,000 with 20% blasts, 11% metamyelocytes, elevated LDH, elevated BNP  - Imaging concerning for lung mass, metastases to c/l lung, liver, spleen, intra-abdominal LNs  - ID consulted for fevers  -----------  At this time, there is low suspicion for infectious cause of fevers and leucocytosis. This could be 2/2 underlying malignancies  - RVP negative, UA negative, Urine Legionella negative, HIV negative. Urine Cx negative, Blood Cx negative till date. Imaging does not show any obvious source of infection  - Patient has granulomas in both lung fields. Family reported negative PPD testing in past (during immigration to ).  - HBcAb positive, HBsAb indeterminate: could be old resolved infection.    Past Abx: Vanc 9/22  Zosyn 9/22-9/24  Fluconazole 9/23-  Levofloxacin 9/25-    Overall, Fever, Leukocytosis, Leukemia, Lung Cancer, Lung Granuloma, HBV Infection    RECOMMENDATIONS:  1. Fevers, leucocytosis  - continue prophylactic antibiotics per Oncology  - if patient has signs of hemodynamic compromise, would recommend re-ordering blood cultures and starting Cefepime 2g IV q12h  - at this time, there appears a low suspicion for infectious source of fevers and leucocytosis. There is no absolute contra-indication from ID standpoint for chest port placement  - will f/u repeat Cx sent today  --------------  2. Lung Granulomas noted in CT Chest  - Family reported negative PPD testing in past (during immigration to ).  - given possibility of future chemotherapy and immune suppression, would check Quantiferon  -----------  3. Positive Hep B core antibody  - HBcAb positive, HBsAb indeterminate: could be old resolved infection.  - recommend checking Hep B PCR  Recs conveyed to primary team    JOSE Nuñez MD  Fellow, Infectious Diseases  Pager: [phone]  After 5pm and on Weekends: Call [phone] none
